# Patient Record
Sex: MALE | Race: WHITE | NOT HISPANIC OR LATINO | Employment: OTHER | URBAN - METROPOLITAN AREA
[De-identification: names, ages, dates, MRNs, and addresses within clinical notes are randomized per-mention and may not be internally consistent; named-entity substitution may affect disease eponyms.]

---

## 2017-01-24 ENCOUNTER — TELEPHONE (OUTPATIENT)
Dept: FAMILY MEDICINE | Facility: CLINIC | Age: 67
End: 2017-01-24

## 2017-01-24 NOTE — TELEPHONE ENCOUNTER
----- Message from Fabienne Benítez sent at 1/24/2017 12:54 PM CST -----  Contact: [phone]/ self   Pt requesting to speak with you in regarding questions about his health . Please advise

## 2017-02-07 ENCOUNTER — OFFICE VISIT (OUTPATIENT)
Dept: FAMILY MEDICINE | Facility: CLINIC | Age: 67
End: 2017-02-07
Payer: MEDICARE

## 2017-02-07 VITALS
SYSTOLIC BLOOD PRESSURE: 130 MMHG | WEIGHT: 155.88 LBS | DIASTOLIC BLOOD PRESSURE: 80 MMHG | HEART RATE: 96 BPM | OXYGEN SATURATION: 98 % | BODY MASS INDEX: 21.11 KG/M2 | HEIGHT: 72 IN

## 2017-02-07 DIAGNOSIS — Z12.2 ENCOUNTER FOR SCREENING FOR LUNG CANCER: ICD-10-CM

## 2017-02-07 DIAGNOSIS — J44.1 CHRONIC OBSTRUCTIVE PULMONARY DISEASE WITH ACUTE EXACERBATION: ICD-10-CM

## 2017-02-07 DIAGNOSIS — E78.6 LOW HDL (UNDER 40): ICD-10-CM

## 2017-02-07 DIAGNOSIS — Z11.59 NEED FOR HEPATITIS C SCREENING TEST: ICD-10-CM

## 2017-02-07 DIAGNOSIS — J45.901 ACUTE EXACERBATION OF COPD WITH ASTHMA: ICD-10-CM

## 2017-02-07 DIAGNOSIS — Z00.00 ENCOUNTER FOR PREVENTATIVE ADULT HEALTH CARE EXAMINATION: Primary | ICD-10-CM

## 2017-02-07 DIAGNOSIS — Z13.6 SCREENING FOR AAA (ABDOMINAL AORTIC ANEURYSM): ICD-10-CM

## 2017-02-07 DIAGNOSIS — J44.1 ACUTE EXACERBATION OF COPD WITH ASTHMA: ICD-10-CM

## 2017-02-07 DIAGNOSIS — Z13.220 LIPID SCREENING: ICD-10-CM

## 2017-02-07 PROCEDURE — 99397 PER PM REEVAL EST PAT 65+ YR: CPT | Mod: S$GLB,,,

## 2017-02-07 PROCEDURE — 3075F SYST BP GE 130 - 139MM HG: CPT | Mod: S$GLB,,,

## 2017-02-07 PROCEDURE — 3079F DIAST BP 80-89 MM HG: CPT | Mod: S$GLB,,,

## 2017-02-07 PROCEDURE — 99999 PR PBB SHADOW E&M-EST. PATIENT-LVL III: CPT | Mod: PBBFAC,,,

## 2017-02-07 PROCEDURE — 99499 UNLISTED E&M SERVICE: CPT | Mod: S$GLB,,,

## 2017-02-07 RX ORDER — ALBUTEROL SULFATE 90 UG/1
2 AEROSOL, METERED RESPIRATORY (INHALATION) EVERY 6 HOURS PRN
Qty: 18 G | Refills: 3 | Status: SHIPPED | OUTPATIENT
Start: 2017-02-07 | End: 2017-05-16 | Stop reason: SDUPTHER

## 2017-02-07 NOTE — PROGRESS NOTES
Subjective:       Patient ID: Rhett Johnson is a 66 y.o. male.    Chief Complaint: Annual Exam    HPI  Data obtained from Agency for Healthcare and Research Quality (AHRQ):    GRADE A -The USPSTF recommends the service. There is high certainty that the net benefit is substantial. Offer or provide this service.    GRADE B - The USPSTF recommends the service. There is high certainty that the net benefit is moderate or there is moderate certainty that the net benefit is moderate to substantial. Offer or provide this service.     View All      20 - Recommended (A, B)    Grade Title Risk Info. Details   UTD  Colorectal Cancer: Screening --Adults aged 50 to 75 years     Low  HIV: Screening - Adolescents and Adults     130/80 High Blood Pressure: Screening -- Adults 18 and Over     Recommended High Blood Pressure: Screening and Home Monitoring -- Adults     Low  Syphilis: Screening --Asymptomatic, nonpregnant adults and adolescents who are at increased risk for syphilis infection     Not interested  Tobacco Smoking Cessation: Behavioral and Pharmacotherapy Interventions -- Adults who are not pregnant     Candidate  Abdominal Aortic Aneurysm: Screening -- Men Ages 65 to 75 Years Who Have Ever Smoked.     Denies  Alcohol Misuse: Screening and Behavioral Counseling Interventions in Primary Care -- Adults     Denies  Depression: Screening -- General adult population, including pregnant and postpartum women     Candidate  Diabetes Mellitus (Type 2) andAbnormal Blood Glucose: Screening -- Adults aged 40 to 70 years who are overweight or obese     Low Risk  Fall Prevention -- Exercise/Physical Therapy: Community-dwelling Adults 65 Years or Older, Increased Risk for Falls     Low Risk  Fall Prevention -- Vitamin D Supplementation: Community-dwelling Adults 65 Years or Older, Increased Risk for Falls     Improved  Healthful Diet and Physical Activity for CVD Disease Prevention: Counseling -- Adults with CVD Risk Factors      Low risk  Hepatitis B: Screening -- Nonpregnant Adolescents and Adults At High Risk     Candidate  Hepatitis C Virus Infection: Screening--Adults at High Risk and Adults born between 1945 and 1965     N/A  Latent Tuberculosis Infection: Screening -- Asymptomatic adults at increased risk for infection     Candidate  Lung Cancer: Screening -- Adults Ages 55-80 who have a 30 pack-year smoking history and currently smoke or have quit within the past 15 years     Normal  Obesity: Screening for and Management of-- All Adults       Low  Sexually Transmitted Infections: Behavioral Counseling -- Sexually Active Adolescents and Adults     Will assess  Statin Use for the Primary Prevention of CVD: Preventive Medicine -- Adults age 40 to 75 years with no history of CVD, 1 or more CVD risk factors, and a calculated 10-year CVD event risk of 10% or greater.          Review of Systems   Constitutional: Negative.  Negative for activity change, appetite change, fatigue and fever.   HENT: Negative.  Negative for congestion.    Eyes: Negative.  Negative for visual disturbance.   Respiratory: Positive for shortness of breath.    Cardiovascular: Negative.    Gastrointestinal: Negative.    Endocrine: Negative.  Negative for cold intolerance, heat intolerance, polydipsia and polyuria.   Genitourinary: Negative.    Musculoskeletal: Positive for back pain. Negative for arthralgias.   Skin: Negative.    Allergic/Immunologic: Negative.    Neurological: Negative.  Negative for dizziness, syncope, speech difficulty, weakness, light-headedness and headaches.   Hematological: Negative.  Negative for adenopathy. Does not bruise/bleed easily.   Psychiatric/Behavioral: Negative.  Negative for agitation, dysphoric mood and sleep disturbance. The patient is not hyperactive.    All other systems reviewed and are negative.      Objective:      Vitals:    02/07/17 0755   BP: 130/80   Pulse: 96     Physical Exam   Constitutional: He is oriented to  person, place, and time. He appears well-developed and well-nourished. He is cooperative. No distress.   HENT:   Head: Normocephalic and atraumatic.   Right Ear: Hearing, tympanic membrane, external ear and ear canal normal.   Left Ear: Hearing, external ear and ear canal normal.   Nose: Nose normal.   Mouth/Throat: Oropharynx is clear and moist.   Eyes: Conjunctivae are normal. Pupils are equal, round, and reactive to light.   Neck: Normal range of motion. Neck supple. No thyromegaly present.   Cardiovascular: Normal rate, regular rhythm, normal heart sounds and intact distal pulses.    Pulmonary/Chest: Effort normal and breath sounds normal. No respiratory distress.   Musculoskeletal: Normal range of motion. He exhibits no edema or tenderness.   Lymphadenopathy:     He has no cervical adenopathy.   Neurological: He is alert and oriented to person, place, and time. He has normal strength. Coordination and gait normal.   Skin: Skin is warm, dry and intact. No cyanosis. Nails show no clubbing.   Psychiatric: He has a normal mood and affect. His speech is normal and behavior is normal. Judgment and thought content normal. Cognition and memory are normal.   Vitals reviewed.      Assessment:       1. Encounter for preventative adult health care examination    2. Acute exacerbation of COPD with asthma    3. Lipid screening    4. Need for hepatitis C screening test    5. Chronic obstructive pulmonary disease with acute exacerbation    6. Low HDL (under 40)    7. Screening for AAA (abdominal aortic aneurysm)    8. Encounter for screening for lung cancer        Plan:       Encounter for preventative adult health care examination    Acute exacerbation of COPD with asthma  -     albuterol 90 mcg/actuation inhaler; Inhale 2 puffs into the lungs every 6 (six) hours as needed for Wheezing.  Dispense: 18 g; Refill: 3  -     aclidinium bromide (TUDORZA PRESSAIR) 400 mcg/actuation AePB; Inhale 1 puff into the lungs 2 (two) times  daily.  Dispense: 3 each; Refill: 3  -     CBC auto differential; Future; Expected date: 2/7/17  -     Comprehensive metabolic panel; Future; Expected date: 2/7/17    Lipid screening    Need for hepatitis C screening test  -     Hepatitis C antibody; Future; Expected date: 2/7/17    Chronic obstructive pulmonary disease with acute exacerbation    Low HDL (under 40)  -     Lipid panel; Future; Expected date: 2/7/17    Screening for AAA (abdominal aortic aneurysm)  -     US Abdominal Aorta; Future; Expected date: 2/7/17    Encounter for screening for lung cancer  -     CT Chest Lung Screening Low Dose; Future; Expected date: 2/7/17    Other orders  -     zoster vaccine live, PF, (ZOSTAVAX, PF,) 19,400 unit/0.65 mL injection; Inject 19,400 Units into the skin once.  Dispense: 1 vial; Refill: 0      Return in about 1 year (around 2/7/2018).

## 2017-02-07 NOTE — MR AVS SNAPSHOT
[facility]  [address] [place] [address]  Phone: [phone]  Fax: [phone]                  Rhett Johnson   2017 8:00 AM   Office Visit    Description:  Male : 1950   Provider:  Tylor Niño Jr., MD   Department:  [facility]           Reason for Visit     Annual Exam           Diagnoses this Visit        Comments    Encounter for preventative adult health care examination    -  Primary     Acute exacerbation of COPD with asthma         Lipid screening         Need for hepatitis C screening test         Chronic obstructive pulmonary disease with acute exacerbation         Low HDL (under 40)         Screening for AAA (abdominal aortic aneurysm)         Encounter for screening for lung cancer                To Do List           Goals (5 Years of Data)     None      Follow-Up and Disposition     Return in about 1 year (around 2018).    Follow-up and Disposition History       These Medications        Disp Refills Start End    albuterol 90 mcg/actuation inhaler 18 g 3 2017    Inhale 2 puffs into the lungs every 6 (six) hours as needed for Wheezing. - Inhalation    [facility] [address] [place] [address] [facility] Ph #: [phone]       aclidinium bromide (TUDORZA PRESSAIR) 400 mcg/actuation AePB 3 each 3 2017     Inhale 1 puff into the lungs 2 (two) times daily. - Inhalation    [facility] [address] [place] [address] [facility] Ph #: [phone]       zoster vaccine live, PF, (ZOSTAVAX, PF,) 19,400 unit/0.65 mL injection 1 vial 0 2017    Inject 19,400 Units into the skin once. - Subcutaneous    [facility] [address] [place] [address] [facility] Ph #: [phone]         [facility]     [facility] Care Line -   Assistance  Registered nurses in the [facility] provide clinical advisement, health education, appointment booking, and other advisory services.  Call for this free service at [phone].             Medications           Message regarding Medications     Verify the changes and/or additions to your medication regime listed below are the same as discussed with your clinician today.  If any of these changes or additions are incorrect, please notify your healthcare provider.        START taking these NEW medications        Refills    zoster vaccine live, PF, (ZOSTAVAX, PF,) 19,400 unit/0.65 mL injection 0    Sig: Inject 19,400 Units into the skin once.    Class: Print    Route: Subcutaneous      STOP taking these medications     mupirocin (BACTROBAN) 2 % ointment FLACO EXT AA BID FOR 10 DAYS    doxycycline monohydrate 100 mg Tab     clindamycin (CLEOCIN) 300 MG capsule TK ONE C PO  TID FOR 10 DAYS           Verify that the below list of medications is an accurate representation of the medications you are currently taking.  If none reported, the list may be blank. If incorrect, please contact your healthcare provider. Carry this list with you in case of emergency.           Current Medications     aclidinium bromide (TUDORZA PRESSAIR) 400 mcg/actuation AePB Inhale 1 puff into the lungs 2 (two) times daily.    albuterol 90 mcg/actuation inhaler Inhale 2 puffs into the lungs every 6 (six) hours as needed for Wheezing.    zoster vaccine live, PF, (ZOSTAVAX, PF,) 19,400 unit/0.65 mL injection Inject 19,400 Units into the skin once.           Clinical Reference Information           Your Vitals Were     BP Pulse Height Weight SpO2 BMI    130/80 (BP Location: Left arm, Patient Position: Sitting, BP Method: Manual) 96 6' (1.829 m) 70.7 kg (155 lb 13.8 oz) 98% 21.14 kg/m2      Blood Pressure          Most Recent Value    BP  130/80      Allergies as of 2/7/2017     Cymbalta [Duloxetine]    Pcn [Penicillins]       Immunizations Administered on Date of Encounter - 2/7/2017     None      Orders Placed During Today's Visit     Future Labs/Procedures Expected by Expires    CBC auto differential  2/7/2017 2/7/2018    Comprehensive metabolic panel  2/7/2017 2/7/2018    CT Chest Lung Screening Low Dose  2/7/2017 2/7/2018    Hepatitis C antibody  2/7/2017 4/8/2018    Lipid panel  2/7/2017 2/7/2018    US Abdominal Aorta  2/7/2017 2/7/2018      [facility] Sign-Up     Activating your [facility] account is as easy as 1-2-3!     1) Visit [email or web address], select Sign Up Now, enter this activation code and your date of birth, then select Next.  [email or web address]  Expires: 3/24/2017  8:32 AM      2) Create a username and password to use when you visit [facility] in the future and select a security question in case you lose your password and select Next.    3) Enter your e-mail address and click Sign Up!    Additional Information  If you have questions, please e-mail [email or web address] or call [phone] to talk to our [facility] staff. Remember, [facility] is NOT to be used for urgent needs. For medical emergencies, dial 911.         Smoking Cessation     If you would like to quit smoking:   You may be eligible for free services if you are a [place] resident and started smoking cigarettes before September 1, 1988.  Call the Smoking Cessation Trust ([facility]) toll free at (345) 403-8689 or (797) 125-5446.   Call 1-800-QUIT-NOW if you do not meet the above criteria.            Language Assistance Services     ATTENTION: Language assistance services are available, free of charge. Please call [phone].      ATENCIÓN: Si habla reny, tiene a jackson disposición servicios gratuitos de asistencia lingüística. Llame al [phone].     CHÚ Ý: N?u b?n nói Ti?ng Vi?t, có các d?ch v? h? tr? ngôn ng? mi?n phí dành cho b?n. G?i s? [phone].         [facility] complies with applicable Federal civil rights laws and does not  discriminate on the basis of race, color, national origin, age, disability, or sex.

## 2017-02-16 ENCOUNTER — TELEPHONE (OUTPATIENT)
Dept: FAMILY MEDICINE | Facility: CLINIC | Age: 67
End: 2017-02-16

## 2017-02-22 ENCOUNTER — TELEPHONE (OUTPATIENT)
Dept: FAMILY MEDICINE | Facility: CLINIC | Age: 67
End: 2017-02-22

## 2017-02-22 DIAGNOSIS — M54.16 LUMBAR RADICULITIS: Primary | ICD-10-CM

## 2017-03-01 ENCOUNTER — TELEPHONE (OUTPATIENT)
Dept: FAMILY MEDICINE | Facility: CLINIC | Age: 67
End: 2017-03-01

## 2017-04-28 PROBLEM — M54.16 LUMBAR RADICULITIS: Status: ACTIVE | Noted: 2017-04-28

## 2017-05-16 DIAGNOSIS — J44.1 ACUTE EXACERBATION OF COPD WITH ASTHMA: ICD-10-CM

## 2017-05-16 DIAGNOSIS — J45.901 ACUTE EXACERBATION OF COPD WITH ASTHMA: ICD-10-CM

## 2017-05-16 RX ORDER — HYDROCODONE BITARTRATE AND ACETAMINOPHEN 7.5; 325 MG/1; MG/1
TABLET ORAL
Refills: 0 | COMMUNITY
Start: 2017-03-14 | End: 2017-07-19

## 2017-05-16 RX ORDER — METHYLPREDNISOLONE 4 MG/1
TABLET ORAL
Refills: 0 | COMMUNITY
Start: 2017-03-14 | End: 2017-07-19

## 2017-05-16 RX ORDER — ALBUTEROL SULFATE 90 UG/1
2 AEROSOL, METERED RESPIRATORY (INHALATION) EVERY 6 HOURS PRN
Qty: 18 G | Refills: 3 | Status: SHIPPED | OUTPATIENT
Start: 2017-05-16 | End: 2017-06-26 | Stop reason: SDUPTHER

## 2017-05-16 NOTE — TELEPHONE ENCOUNTER
----- Message from Lana Eason sent at 5/15/2017  3:59 PM CDT -----  Patient called asking doctor to fill his 3 month medicines             aclidinium bromide (TUDORZA PRESSAIR) 400 mcg/actuation AePB       albuterol 90 mcg/actuation inhaler  [facility] in [place]  Reach at   [phone]

## 2017-06-26 DIAGNOSIS — J44.1 ACUTE EXACERBATION OF COPD WITH ASTHMA: ICD-10-CM

## 2017-06-26 DIAGNOSIS — J45.901 ACUTE EXACERBATION OF COPD WITH ASTHMA: ICD-10-CM

## 2017-06-26 RX ORDER — KETOROLAC TROMETHAMINE 10 MG/1
TABLET, FILM COATED ORAL
Refills: 0 | COMMUNITY
Start: 2017-06-02 | End: 2017-07-19

## 2017-06-26 RX ORDER — METHOCARBAMOL 500 MG/1
TABLET, FILM COATED ORAL
Refills: 0 | COMMUNITY
Start: 2017-06-19 | End: 2018-12-10

## 2017-06-26 RX ORDER — ALBUTEROL SULFATE 90 UG/1
2 AEROSOL, METERED RESPIRATORY (INHALATION) EVERY 6 HOURS PRN
Qty: 18 G | Refills: 3 | Status: SHIPPED | OUTPATIENT
Start: 2017-06-26 | End: 2017-10-25 | Stop reason: SDUPTHER

## 2017-06-26 RX ORDER — TRAMADOL HYDROCHLORIDE 50 MG/1
TABLET ORAL
Refills: 0 | COMMUNITY
Start: 2017-06-05 | End: 2017-07-19

## 2017-06-26 NOTE — TELEPHONE ENCOUNTER
----- Message from Ginger Yousif sent at 6/26/2017  1:55 PM CDT -----  REFILLS:   Patient is requesting a medication refill.   RX name:ventolin hfa  Strength: 80 mg  Directions:   Pharmacy name: [facility] in [place]  Pharmacy phone #:

## 2017-10-25 DIAGNOSIS — J45.901 ACUTE EXACERBATION OF COPD WITH ASTHMA: ICD-10-CM

## 2017-10-25 DIAGNOSIS — J44.1 ACUTE EXACERBATION OF COPD WITH ASTHMA: ICD-10-CM

## 2017-10-26 RX ORDER — ALBUTEROL SULFATE 90 UG/1
AEROSOL, METERED RESPIRATORY (INHALATION)
Qty: 18 G | Refills: 11 | Status: SHIPPED | OUTPATIENT
Start: 2017-10-26 | End: 2018-01-05 | Stop reason: SDUPTHER

## 2017-11-13 ENCOUNTER — NURSE TRIAGE (OUTPATIENT)
Dept: ADMINISTRATIVE | Facility: CLINIC | Age: 67
End: 2017-11-13

## 2017-12-06 ENCOUNTER — OFFICE VISIT (OUTPATIENT)
Dept: FAMILY MEDICINE | Facility: CLINIC | Age: 67
End: 2017-12-06
Payer: MEDICARE

## 2017-12-06 VITALS
WEIGHT: 144.88 LBS | DIASTOLIC BLOOD PRESSURE: 80 MMHG | HEIGHT: 71 IN | OXYGEN SATURATION: 98 % | HEART RATE: 110 BPM | BODY MASS INDEX: 20.28 KG/M2 | RESPIRATION RATE: 16 BRPM | SYSTOLIC BLOOD PRESSURE: 140 MMHG

## 2017-12-06 DIAGNOSIS — I10 ESSENTIAL HYPERTENSION: ICD-10-CM

## 2017-12-06 DIAGNOSIS — Z85.46 HISTORY OF PROSTATE CANCER: ICD-10-CM

## 2017-12-06 DIAGNOSIS — J44.1 CHRONIC OBSTRUCTIVE PULMONARY DISEASE WITH ACUTE EXACERBATION: ICD-10-CM

## 2017-12-06 DIAGNOSIS — I70.0 ATHEROSCLEROSIS OF AORTA: ICD-10-CM

## 2017-12-06 DIAGNOSIS — Z00.00 ENCOUNTER FOR PREVENTIVE HEALTH EXAMINATION: Primary | ICD-10-CM

## 2017-12-06 DIAGNOSIS — Z77.090 EXPOSURE TO ASBESTOS: ICD-10-CM

## 2017-12-06 DIAGNOSIS — E78.5 HYPERLIPIDEMIA, UNSPECIFIED HYPERLIPIDEMIA TYPE: ICD-10-CM

## 2017-12-06 DIAGNOSIS — M54.16 LUMBAR RADICULITIS: ICD-10-CM

## 2017-12-06 DIAGNOSIS — Z23 IMMUNIZATION DUE: ICD-10-CM

## 2017-12-06 PROCEDURE — 99499 UNLISTED E&M SERVICE: CPT | Mod: S$GLB,,, | Performed by: NURSE PRACTITIONER

## 2017-12-06 PROCEDURE — 99999 PR PBB SHADOW E&M-EST. PATIENT-LVL V: CPT | Mod: PBBFAC,,, | Performed by: NURSE PRACTITIONER

## 2017-12-06 PROCEDURE — G0439 PPPS, SUBSEQ VISIT: HCPCS | Mod: S$GLB,,, | Performed by: NURSE PRACTITIONER

## 2017-12-06 PROCEDURE — 90732 PPSV23 VACC 2 YRS+ SUBQ/IM: CPT | Mod: S$GLB,,, | Performed by: FAMILY MEDICINE

## 2017-12-06 PROCEDURE — G0009 ADMIN PNEUMOCOCCAL VACCINE: HCPCS | Mod: S$GLB,,, | Performed by: FAMILY MEDICINE

## 2017-12-06 RX ORDER — LISINOPRIL 40 MG/1
TABLET ORAL
Refills: 11 | COMMUNITY
Start: 2017-11-06 | End: 2023-03-13

## 2017-12-06 RX ORDER — PRAVASTATIN SODIUM 40 MG/1
TABLET ORAL
Refills: 11 | COMMUNITY
Start: 2017-11-06 | End: 2023-07-10

## 2017-12-06 RX ORDER — LATANOPROST 50 UG/ML
SOLUTION/ DROPS OPHTHALMIC
Refills: 0 | COMMUNITY
Start: 2017-11-16 | End: 2018-12-10

## 2017-12-06 NOTE — Clinical Note
Primary Care Providers: Tylor Niño Jr., MD, MD (General)  Your patient was seen today for a HRA visit. Gap(s) in care (HEDIS gaps) have been identified during this visit that require additional testing and possible follow up.  Orders Placed This Encounter     (In Office Administered) Pneumococcal Polysaccharide Vaccine (23 Valent) (SQ/IM)  These orders were placed using [facility] approved protocol and any results will be forwarded to your office for appropriate follow up. I have included a copy of my visit note; please review the note and feel free to contact me with any questions.   Thank you for allowing me to participate in the care of your patients. Samantha Clark NP

## 2017-12-06 NOTE — PROGRESS NOTES
"Rhett Johnson presented for a  Medicare AWV and comprehensive Health Risk Assessment today. The following components were reviewed and updated:    · Medical history  · Family History  · Social history  · Allergies and Current Medications  · Health Risk Assessment  · Health Maintenance  · Care Team     ** See Completed Assessments for Annual Wellness Visit within the encounter summary.**       The following assessments were completed:  · Living Situation  · CAGE  · Depression Screening  · Timed Get Up and Go  · Whisper Test  · Cognitive Function Screening  · Nutrition Screening  · ADL Screening  · PAQ Screening    Vitals:    12/06/17 0752 12/06/17 0900   BP: (!) 146/86 (!) 140/80   BP Location: Right arm Right arm   Patient Position: Sitting Sitting   BP Method:  Medium (Manual)   Pulse: 110    Resp: 16    SpO2: 98%    Weight: 65.7 kg (144 lb 14.4 oz)    Height: 5' 11" (1.803 m)      Body mass index is 20.21 kg/m².  Physical Exam   Constitutional: He is oriented to person, place, and time. He appears well-developed and well-nourished. No distress.   HENT:   Head: Normocephalic and atraumatic.   Eyes: EOM are normal. Pupils are equal, round, and reactive to light.   Neck: Normal range of motion.   Cardiovascular: Normal rate, regular rhythm and normal heart sounds.    Pulmonary/Chest: Effort normal. No respiratory distress. He has decreased breath sounds. He has no wheezes. He has no rhonchi. He has no rales.   Abdominal: Bowel sounds are increased. There is no tenderness.   Musculoskeletal: Normal range of motion. He exhibits no edema.   Neurological: He is alert and oriented to person, place, and time. Coordination normal.   Skin: Skin is warm and dry.   Psychiatric: He has a normal mood and affect. His behavior is normal. Judgment and thought content normal.   Nursing note and vitals reviewed.        Diagnoses and health risks identified today and associated recommendations/orders:    1. Encounter for preventive " health examination    2. Chronic obstructive pulmonary disease with acute exacerbation  Chronic; stable. Continue current treatment plan as previously prescribed by PCP.     3. Atherosclerosis of aorta  Noted on US Abd. Aorta dated 2/16/2017. Patient followed by Cardiology (Dr. Roque).     4. Essential hypertension  Chronic; unstable. Continue current treatment plan as previously prescribed by PCP. Follow-up with PCP in 2 weeks.     5. Hyperlipidemia, unspecified hyperlipidemia type  Chronic; stable. Continue current treatment plan as previously prescribed by PCP.     6. Lumbar radiculitis  Chronic; stable. Continue current treatment plan as previously prescribed by Pain Medicine (Dr. Landrum).    7. Exposure to asbestos  Chronic; stable. Patient followed by PCP.    8. History of prostate cancer  Chronic; stable. Patient was followed by Urology (Dr. Bai).    9. Immunization due  - (In Office Administered) Pneumococcal Polysaccharide Vaccine (23 Valent) (SQ/IM)      Provided Rhett with a 5-10 year written screening schedule and personal prevention plan. Recommendations were developed using the USPSTF age appropriate recommendations. Education, counseling, and referrals were provided as needed. After Visit Summary printed and given to patient which includes a list of additional screenings\tests needed.    Return in about 2 weeks (around 12/20/2017) for HRA visit in 1 year.    Samantha Clark NP

## 2017-12-06 NOTE — PATIENT INSTRUCTIONS
Counseling and Referral of Other Preventative  (Italic type indicates deductible and co-insurance are waived)    Patient Name: Rhett Johnson  Today's Date: 12/6/2017      SERVICE LIMITATIONS RECOMMENDATION    Vaccines    · Pneumococcal (once after 65)    · Influenza (annually)    · Hepatitis B (if medium/high risk)    · Prevnar 13      Hepatitis B medium/high risk factors:       - End-stage renal disease       - Hemophiliacs who received Factor VII or         IX concentrates       - Clients of institutions for the mentally             retarded       - Persons who live in the same house as          a HepB carrier       - Homosexual men       - Illicit injectable drug abusers     Pneumococcal: Scheduled - see appointments     Influenza: Recommended to patient, declined     Hepatitis B: N/A     Prevnar 13: Done, no repeat necessary    Prostate cancer screening (annually to age 75)     Prostate specific antigen (PSA) Shared decision making with Provider. Sometimes a co-pay may be required if the patient decides to have this test. The USPSTF no longer recommends prostate cancer screening routinely in medicine: every 1 year    Colorectal cancer screening (to age 75)    · Fecal occult blood test (annual)  · Flexible sigmoidoscopy (5y)  · Screening colonoscopy (10y)  · Barium enema   Last done 8/22/2012, recommend to repeat every 10  years    Diabetes self-management training (no USPSTF recommendations)  Requires referral by treating physician for patient with diabetes or renal disease. 10 hours of initial DSMT sessions of no less than 30 minutes each in a continuous 12-month period. 2 hours of follow-up DSMT in subsequent years.  N/A    Glaucoma screening (no USPSTF recommendation)  Diabetes mellitus, family history   , age 50 or over    American, age 65 or over  N/A    Medical nutrition therapy for diabetes or renal disease (no recommended schedule)  Requires referral by treating physician for  patient with diabetes or renal disease or kidney transplant within the past 3 years.  Can be provided in same year as diabetes self-management training (DSMT), and CMS recommends medical nutrition therapy take place after DSMT. Up to 3 hours for initial year and 2 hours in subsequent years.  N/A    Cardiovascular screening blood tests (every 5 years)  · Fasting lipid panel  Order as a panel if possible  Last done 2/16/2017, recommend to repeat every 1  years    Diabetes screening tests (at least every 3 years, Medicare covers annually or at 6-month intervals for prediabetic patients)  · Fasting blood sugar (FBS) or glucose tolerance test (GTT)  Patient must be diagnosed with one of the following:       - Hypertension       - Dyslipidemia       - Obesity (BMI 30kg/m2)       - Previous elevated impaired FBS or GTT       ... or any two of the following:       - Overweight (BMI 25 but <30)       - Family history of diabetes       - Age 65 or older       - History of gestational diabetes or birth of baby weighing more than 9 pounds  N/A    Abdominal aortic aneurysm screening (once)  · Sonogram   Limited to patients who meet one of the following criteria:       - Men who are 65-75 years old and have smoked more than 100 cigarette in their lifetime       - Anyone with a family history of abdominal aortic aneurysm       - Anyone recommended for screening by the USPSTF  Done, no repeat necessary    HIV screening (annually for increased risk patients)  · HIV-1 and HIV-2 by EIA, or DELFINO, rapid antibody test or oral mucosa transudate  Patients must be at increased risk for HIV infection per USPSTF guidelines or pregnant. Tests covered annually for patient at increased risk or as requested by the patient. Pregnant patients may receive up to 3 tests during pregnancy.  Risks discussed, screening is not recommended    Smoking cessation counseling (up to 8 sessions per year)  Patients must be asymptomatic of tobacco-related  conditions to receive as a preventative service.  Counseled for 3-10 minutes.    Subsequent annual wellness visit  At least 12 months since last AWV  Return in one year     The following information is provided to all patients.  This information is to help you find resources for any of the problems found today that may be affecting your health:                Living healthy guide: www.[facility].[place].[facility]      Understanding Diabetes: www.diabetes.org      Eating healthy: www.cdc.gov/healthyweight      CDC home safety checklist: www.cdc.gov/steadi/patient.html      Agency on Aging: www.goea.[place].[facility]      Alcoholics anonymous (AA): www.aa.org      Physical Activity: www.[place].nih.gov/yw6bcqi      Tobacco use: www.quitwithusla.org

## 2018-01-05 DIAGNOSIS — J44.1 ACUTE EXACERBATION OF COPD WITH ASTHMA: ICD-10-CM

## 2018-01-05 DIAGNOSIS — J45.901 ACUTE EXACERBATION OF COPD WITH ASTHMA: ICD-10-CM

## 2018-01-05 NOTE — TELEPHONE ENCOUNTER
----- Message from Casey Mata sent at 1/5/2018  2:08 PM CST -----  Contact: [phone]/self  Refill request for   1. VENTOLIN HFA 90 mcg/actuation inhaler  2. aclidinium bromide (TUDORZA PRESSAIR) 400 mcg/actuation AePB  Pt would like prescription called into [facility] [phone]

## 2018-01-06 RX ORDER — ALBUTEROL SULFATE 90 UG/1
AEROSOL, METERED RESPIRATORY (INHALATION)
Qty: 18 G | Refills: 11 | Status: SHIPPED | OUTPATIENT
Start: 2018-01-06 | End: 2018-01-10

## 2018-01-08 ENCOUNTER — TELEPHONE (OUTPATIENT)
Dept: FAMILY MEDICINE | Facility: CLINIC | Age: 68
End: 2018-01-08

## 2018-01-08 NOTE — TELEPHONE ENCOUNTER
----- Message from Lana Eason sent at 1/8/2018  9:37 AM CST -----  Patient called to say insurance has changed     He uses [facility] in [place]     [facility]  [facility]  By PH[facility]    Patient needs something else to replace this     Reach at  [phone]

## 2018-01-10 ENCOUNTER — TELEPHONE (OUTPATIENT)
Dept: FAMILY MEDICINE | Facility: CLINIC | Age: 68
End: 2018-01-10

## 2018-01-10 RX ORDER — ALBUTEROL SULFATE 90 UG/1
2 AEROSOL, METERED RESPIRATORY (INHALATION) EVERY 6 HOURS PRN
Qty: 18 G | Refills: 11 | Status: SHIPPED | OUTPATIENT
Start: 2018-01-10

## 2018-01-10 NOTE — TELEPHONE ENCOUNTER
----- Message from Lana Eason sent at 1/10/2018 11:33 AM CST -----  Came by office to say he wants doctor and nurse to call in the alternative inhaler that they called him about      It is an alternative to     aclidinium bromide (TUDORZA PRESSAIR) 400 mcg/actuation AePB      CALL TO [facility]   Per patient

## 2018-01-11 NOTE — TELEPHONE ENCOUNTER
This was sent on January 6, 2018.        aclidinium bromide (TUDORZA PRESSAIR) 400 mcg/actuation AePB 1 puff, 2 times daily 3 ordered         Summary: Inhale 1 puff into the lungs 2 (two) times daily., Starting Sat 1/6/2018, Normal   Dose, Route, Frequency: 1 puff, Inhalation, 2 times daily  Start: 1/6/2018  Ord/Sold: 1/6/2018 (O)  Report  Long-term:   Pharmacy: [facility] [address] [place] [address]  Med Dose History  ChangeDiscontinue       Patient Sig: Inhale 1 puff into the lungs 2 (two) times daily.       Ordered on: 1/6/2018       Authorized by: GLENNY LYLES JR       Dispense: 3 each

## 2018-02-05 ENCOUNTER — TELEPHONE (OUTPATIENT)
Dept: FAMILY MEDICINE | Facility: CLINIC | Age: 68
End: 2018-02-05

## 2018-02-05 DIAGNOSIS — J44.1 ACUTE EXACERBATION OF COPD WITH ASTHMA: ICD-10-CM

## 2018-02-05 DIAGNOSIS — J45.901 ACUTE EXACERBATION OF COPD WITH ASTHMA: ICD-10-CM

## 2018-02-05 RX ORDER — TIOTROPIUM BROMIDE 18 UG/1
18 CAPSULE ORAL; RESPIRATORY (INHALATION) DAILY
Qty: 30 CAPSULE | Refills: 11 | Status: SHIPPED | OUTPATIENT
Start: 2018-02-05 | End: 2018-12-10

## 2018-02-05 NOTE — TELEPHONE ENCOUNTER
----- Message from Lana Eason sent at 2/5/2018 11:12 AM CST -----  Per pt:  aclidinium bromide (TUDORZA PRESSAIR) 400 mcg/actuation AePB  is not covered and Needs SPIRIVA      SEND TO [facility]

## 2018-02-05 NOTE — TELEPHONE ENCOUNTER
Pt states his wife called the insurance company and is covered. Notified pt script sent to [facility].

## 2018-02-06 ENCOUNTER — TELEPHONE (OUTPATIENT)
Dept: FAMILY MEDICINE | Facility: CLINIC | Age: 68
End: 2018-02-06

## 2018-02-06 NOTE — TELEPHONE ENCOUNTER
OK but if this is not covered he needs to get these medications from the prescribing physician not me.

## 2018-02-06 NOTE — TELEPHONE ENCOUNTER
----- Message from Lupe Dennis sent at 2/6/2018 12:35 PM CST -----  Contact: [phone]  Patient is requesting a call back regarding his rx.Please advise.

## 2018-08-20 DIAGNOSIS — Z72.0 TOBACCO USE: Primary | ICD-10-CM

## 2018-12-12 PROBLEM — H25.12 NUCLEAR SCLEROTIC CATARACT OF LEFT EYE: Status: ACTIVE | Noted: 2018-12-12

## 2018-12-12 PROBLEM — H25.12 NUCLEAR SCLEROTIC CATARACT OF LEFT EYE: Status: RESOLVED | Noted: 2018-12-12 | Resolved: 2018-12-12

## 2019-03-21 ENCOUNTER — OFFICE VISIT (OUTPATIENT)
Dept: ALLERGY | Facility: CLINIC | Age: 69
End: 2019-03-21
Payer: MEDICARE

## 2019-03-21 VITALS
WEIGHT: 159.63 LBS | HEIGHT: 72 IN | SYSTOLIC BLOOD PRESSURE: 132 MMHG | DIASTOLIC BLOOD PRESSURE: 74 MMHG | BODY MASS INDEX: 21.62 KG/M2 | HEART RATE: 88 BPM

## 2019-03-21 DIAGNOSIS — R21 RASH: Primary | ICD-10-CM

## 2019-03-21 DIAGNOSIS — L29.9 ITCHING: ICD-10-CM

## 2019-03-21 PROCEDURE — 3075F PR MOST RECENT SYSTOLIC BLOOD PRESS GE 130-139MM HG: ICD-10-PCS | Mod: CPTII,S$GLB,, | Performed by: STUDENT IN AN ORGANIZED HEALTH CARE EDUCATION/TRAINING PROGRAM

## 2019-03-21 PROCEDURE — 99999 PR PBB SHADOW E&M-EST. PATIENT-LVL III: ICD-10-PCS | Mod: PBBFAC,,, | Performed by: STUDENT IN AN ORGANIZED HEALTH CARE EDUCATION/TRAINING PROGRAM

## 2019-03-21 PROCEDURE — 3075F SYST BP GE 130 - 139MM HG: CPT | Mod: CPTII,S$GLB,, | Performed by: STUDENT IN AN ORGANIZED HEALTH CARE EDUCATION/TRAINING PROGRAM

## 2019-03-21 PROCEDURE — 1101F PR PT FALLS ASSESS DOC 0-1 FALLS W/OUT INJ PAST YR: ICD-10-PCS | Mod: CPTII,S$GLB,, | Performed by: STUDENT IN AN ORGANIZED HEALTH CARE EDUCATION/TRAINING PROGRAM

## 2019-03-21 PROCEDURE — 1101F PT FALLS ASSESS-DOCD LE1/YR: CPT | Mod: CPTII,S$GLB,, | Performed by: STUDENT IN AN ORGANIZED HEALTH CARE EDUCATION/TRAINING PROGRAM

## 2019-03-21 PROCEDURE — 99499 UNLISTED E&M SERVICE: CPT | Mod: S$GLB,,, | Performed by: STUDENT IN AN ORGANIZED HEALTH CARE EDUCATION/TRAINING PROGRAM

## 2019-03-21 PROCEDURE — 3078F PR MOST RECENT DIASTOLIC BLOOD PRESSURE < 80 MM HG: ICD-10-PCS | Mod: CPTII,S$GLB,, | Performed by: STUDENT IN AN ORGANIZED HEALTH CARE EDUCATION/TRAINING PROGRAM

## 2019-03-21 PROCEDURE — 99499 RISK ADDL DX/OHS AUDIT: ICD-10-PCS | Mod: S$GLB,,, | Performed by: STUDENT IN AN ORGANIZED HEALTH CARE EDUCATION/TRAINING PROGRAM

## 2019-03-21 PROCEDURE — 99999 PR PBB SHADOW E&M-EST. PATIENT-LVL III: CPT | Mod: PBBFAC,,, | Performed by: STUDENT IN AN ORGANIZED HEALTH CARE EDUCATION/TRAINING PROGRAM

## 2019-03-21 PROCEDURE — 99203 OFFICE O/P NEW LOW 30 MIN: CPT | Mod: S$GLB,,, | Performed by: STUDENT IN AN ORGANIZED HEALTH CARE EDUCATION/TRAINING PROGRAM

## 2019-03-21 PROCEDURE — 99203 PR OFFICE/OUTPT VISIT, NEW, LEVL III, 30-44 MIN: ICD-10-PCS | Mod: S$GLB,,, | Performed by: STUDENT IN AN ORGANIZED HEALTH CARE EDUCATION/TRAINING PROGRAM

## 2019-03-21 PROCEDURE — 3078F DIAST BP <80 MM HG: CPT | Mod: CPTII,S$GLB,, | Performed by: STUDENT IN AN ORGANIZED HEALTH CARE EDUCATION/TRAINING PROGRAM

## 2019-03-21 RX ORDER — CETIRIZINE HYDROCHLORIDE 10 MG/1
TABLET ORAL
Qty: 60 TABLET | Refills: 3 | Status: SHIPPED | OUTPATIENT
Start: 2019-03-21 | End: 2019-04-01

## 2019-03-21 NOTE — PROGRESS NOTES
Allergy Clinic Note  [facility]    Subjective:      Patient ID: Rhett Johnson is a 68 y.o. male.    Chief Complaint: Other (rash since July)      Referring Provider: Self, Aaareferral    History of Present Illness:  68-year-old male on prior presents for continuing evaluation of an itchy rashes since July 2018.    Patient was well until July of 2018 when following contact with a some pushes in [place] he developed and losing dermatitis of the left forearm.  About a month later he developed itchy pink papules on a pink base on his legs and fewer itchy pink papules on his torso.  The rash is pruritic but not helped by Claritin or Benadryl in the past.  He saw a dermatologist who prescribed dose pack and steroid cream with instructions to return as needed.  Instead he sought care from a 2nd provider, Dr. Pan (GP with special interest in derm)  Who instructed him to stop all topical therapies, placed immune a light box, and referred him for patch testing (as written on prescription).      Patient presents to me requesting RAST testing to things in the air.  It is not clear if he confused patch testing with RAST testing or if someone did suggest RAST testing.    Additional History:   Past medical history is significant for  COPD and smoking.  He is status post spinal surgery Family history is significant for allergies in his children. Client   reports that he has been smoking cigarettes.  He has a 51.00 pack-year smoking history. he has never used smokeless tobacco.  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  Prior to retiring 6 years ago, he worked in a factory.    Patient Active Problem List   Diagnosis    Essential hypertension    Exposure to asbestos    Chronic obstructive pulmonary disease with acute exacerbation    Lumbar radiculitis    Hyperlipidemia    Atherosclerosis of aorta    History of prostate cancer     Current  Outpatient Medications on File Prior to Visit   Medication Sig Dispense Refill    albuterol 90 mcg/actuation inhaler Inhale 2 puffs into the lungs every 6 (six) hours as needed for Wheezing. Rescue 18 g 11    lisinopril (PRINIVIL,ZESTRIL) 40 MG tablet TK 1 T PO QD  11    MAGNESIUM ORAL Take by mouth once a week.      potassium 99 mg Tab Take by mouth once daily.      pravastatin (PRAVACHOL) 40 MG tablet TK 1 T PO 1 TIME HS  11    umeclidinium (INCRUSE ELLIPTA) 62.5 mcg/actuation DsDv Inhale 1 Inhaler into the lungs once daily. Controller 30 each 11    acetaminophen (TYLENOL) 325 MG tablet Take 1 tablet (325 mg total) by mouth every 6 (six) hours as needed for Pain.  0    acetaminophen (TYLENOL) 325 MG tablet Take 1 tablet (325 mg total) by mouth every 6 (six) hours as needed for Pain.  0    besifloxacin (BESIVANCE) 0.6 % DrpS 1 drop 3 (three) times daily.      bromfenac (PROLENSA) 0.07 % Drop Apply 1 drop to eye once daily.      difluprednate (DUREZOL) 0.05 % Drop ophthalmic solution 1 drop 2 (two) times daily.      lifitegrast (XIIDRA OPHT) Apply to eye 2 (two) times daily.       No current facility-administered medications on file prior to visit.          Review of Systems   Constitutional: Negative for chills and fever.   HENT: Negative for ear discharge and nosebleeds.    Eyes: Negative for discharge and redness.   Respiratory: Positive for cough and wheezing. Negative for stridor.    Cardiovascular: Negative for palpitations and leg swelling.   Gastrointestinal: Negative for blood in stool, melena and vomiting.   Genitourinary: Negative for dysuria and hematuria.   Skin: Positive for itching and rash.   Neurological: Negative for seizures and loss of consciousness.       Objective:   /74   Pulse 88   Ht 6' (1.829 m)   Wt 72.4 kg (159 lb 9.8 oz)   BMI 21.65 kg/m²       Physical Exam   Constitutional: He is oriented to person, place, and time and well-developed, well-nourished, and in no  distress.   HENT:   Head: Normocephalic and atraumatic.   Nose: Nose normal.   Eyes: Conjunctivae are normal. No scleral icterus.   Neck: Neck supple.   Cardiovascular: Normal rate, regular rhythm and intact distal pulses.   Pulmonary/Chest: Effort normal. No stridor. No respiratory distress.   Abdominal: He exhibits no distension.   Musculoskeletal: He exhibits no edema or deformity.   Neurological: He is alert and oriented to person, place, and time.   Skin: Rash noted. No erythema.   Red face.  Scattered pink papules on torso.  Significantly more pink papules on right greater than left lower extremity on a pink base.   Psychiatric: Memory and affect normal.       Data: none      Assessment:     1. Rash    2. Itching        Plan:     Medical decision making:  Patient is presenting with a 7 month history of a pruritic rash.  It does not appear to be hives or atopic dermatitis.  Its appearance is more consistent with contact dermatitis.  I think the yield from rales testing would be extremely low.  I recommend he seek care from a board certified dermatologist for consideration of patch testing or other diagnostic evaluation.    Diagnoses and all orders for this visit:    Rash  Referred to Dermatology    Itching  -     cetirizine (ZYRTEC) 10 MG tablet; Take 1 tablet up to twice a day.  Can be used regularly or just when needed.        Patient Instructions   See Dr Diana Wallace  To consider evaluation of contact dermatology    Recommend:  1.  Curel moisturizer all over at least once a day    2.  Zyrtec (=cetirizine, 10mg) 1-2 tablets every day.           Take one every morning.  Take another later if needed.      Follow-up if symptoms worsen or fail to improve.    Janie Tobar MD

## 2019-03-21 NOTE — PATIENT INSTRUCTIONS
See Dr Diana Wallace  To consider evaluation of contact dermatology    Recommend:  1.  Curel moisturizer all over at least once a day    2.  Zyrtec (=cetirizine, 10mg) 1-2 tablets every day.           Take one every morning.  Take another later if needed.

## 2019-04-03 PROBLEM — H25.13 NS (NUCLEAR SCLEROSIS), BILATERAL: Status: ACTIVE | Noted: 2019-04-03

## 2019-04-03 PROBLEM — H25.13 NS (NUCLEAR SCLEROSIS), BILATERAL: Status: RESOLVED | Noted: 2019-04-03 | Resolved: 2019-04-03

## 2019-07-12 ENCOUNTER — OFFICE VISIT (OUTPATIENT)
Dept: URGENT CARE | Facility: CLINIC | Age: 69
End: 2019-07-12
Payer: MEDICARE

## 2019-07-12 VITALS
RESPIRATION RATE: 18 BRPM | HEIGHT: 72 IN | HEART RATE: 97 BPM | OXYGEN SATURATION: 97 % | DIASTOLIC BLOOD PRESSURE: 78 MMHG | BODY MASS INDEX: 21.67 KG/M2 | WEIGHT: 160 LBS | SYSTOLIC BLOOD PRESSURE: 145 MMHG | TEMPERATURE: 98 F

## 2019-07-12 DIAGNOSIS — R03.0 ELEVATED BLOOD PRESSURE READING: Primary | ICD-10-CM

## 2019-07-12 DIAGNOSIS — Z87.09 HISTORY OF COPD: ICD-10-CM

## 2019-07-12 DIAGNOSIS — J32.9 SINUSITIS, UNSPECIFIED CHRONICITY, UNSPECIFIED LOCATION: ICD-10-CM

## 2019-07-12 PROCEDURE — 3077F SYST BP >= 140 MM HG: CPT | Mod: CPTII,S$GLB,, | Performed by: NURSE PRACTITIONER

## 2019-07-12 PROCEDURE — 1101F PT FALLS ASSESS-DOCD LE1/YR: CPT | Mod: CPTII,S$GLB,, | Performed by: NURSE PRACTITIONER

## 2019-07-12 PROCEDURE — 99214 OFFICE O/P EST MOD 30 MIN: CPT | Mod: S$GLB,,, | Performed by: NURSE PRACTITIONER

## 2019-07-12 PROCEDURE — 3078F PR MOST RECENT DIASTOLIC BLOOD PRESSURE < 80 MM HG: ICD-10-PCS | Mod: CPTII,S$GLB,, | Performed by: NURSE PRACTITIONER

## 2019-07-12 PROCEDURE — 1101F PR PT FALLS ASSESS DOC 0-1 FALLS W/OUT INJ PAST YR: ICD-10-PCS | Mod: CPTII,S$GLB,, | Performed by: NURSE PRACTITIONER

## 2019-07-12 PROCEDURE — 99214 PR OFFICE/OUTPT VISIT, EST, LEVL IV, 30-39 MIN: ICD-10-PCS | Mod: S$GLB,,, | Performed by: NURSE PRACTITIONER

## 2019-07-12 PROCEDURE — 3078F DIAST BP <80 MM HG: CPT | Mod: CPTII,S$GLB,, | Performed by: NURSE PRACTITIONER

## 2019-07-12 PROCEDURE — 3077F PR MOST RECENT SYSTOLIC BLOOD PRESSURE >= 140 MM HG: ICD-10-PCS | Mod: CPTII,S$GLB,, | Performed by: NURSE PRACTITIONER

## 2019-07-12 RX ORDER — MUPIROCIN 20 MG/G
OINTMENT TOPICAL
Refills: 0 | COMMUNITY
Start: 2019-06-23 | End: 2023-07-10

## 2019-07-12 RX ORDER — PREDNISONE 20 MG/1
20 TABLET ORAL DAILY
Qty: 4 TABLET | Refills: 0 | Status: SHIPPED | OUTPATIENT
Start: 2019-07-12 | End: 2019-07-16

## 2019-07-12 RX ORDER — PRAVASTATIN SODIUM 40 MG/1
TABLET ORAL
COMMUNITY
Start: 2019-01-31 | End: 2023-07-10

## 2019-07-12 RX ORDER — LISINOPRIL 40 MG/1
TABLET ORAL
COMMUNITY
Start: 2019-05-09 | End: 2023-03-13

## 2019-07-12 RX ORDER — ALBUTEROL SULFATE 90 UG/1
AEROSOL, METERED RESPIRATORY (INHALATION)
COMMUNITY
Start: 2019-06-05 | End: 2023-07-10

## 2019-07-12 RX ORDER — AZITHROMYCIN 250 MG/1
TABLET, FILM COATED ORAL
Qty: 6 TABLET | Refills: 0 | Status: SHIPPED | OUTPATIENT
Start: 2019-07-12 | End: 2023-07-10

## 2019-07-12 NOTE — PROGRESS NOTES
Subjective:       Patient ID: Rhett Johnson is a 68 y.o. male.    Vitals:  height is 6' (1.829 m) and weight is 72.6 kg (160 lb). His oral temperature is 98 °F (36.7 °C). His blood pressure is 145/78 (abnormal) and his pulse is 97. His respiration is 18 and oxygen saturation is 97%.     Chief Complaint: Sinus Problem    Sinus Problem   This is a new problem. The current episode started in the past 7 days (last Saturday). The problem is unchanged. There has been no fever. His pain is at a severity of 0/10. He is experiencing no pain. Associated symptoms include congestion, coughing, ear pain and a sore throat. Pertinent negatives include no chills, diaphoresis, shortness of breath or sinus pressure. Treatments tried: NyQuil, Muscinex, DayQuil and AlkaSelzer cold plus. The treatment provided mild relief.       Constitution: Negative for chills, sweating, fatigue and fever.   HENT: Positive for ear pain, congestion and sore throat. Negative for sinus pain and sinus pressure.         Ears feel plugged and scratchy throat   Neck: Negative for painful lymph nodes.   Eyes: Negative for eye redness.   Respiratory: Positive for cough and sputum production. Negative for chest tightness, bloody sputum, COPD, shortness of breath, stridor, wheezing and asthma.    Gastrointestinal: Negative for nausea and vomiting.   Musculoskeletal: Negative for muscle ache.   Skin: Negative for rash.   Allergic/Immunologic: Negative for seasonal allergies and asthma.   Hematologic/Lymphatic: Negative for swollen lymph nodes.       Objective:      Physical Exam   Constitutional: He appears well-developed and well-nourished. He is active and cooperative.  Non-toxic appearance. He does not appear ill. No distress.   HENT:   Head: Normocephalic and atraumatic.   Right Ear: Hearing, tympanic membrane, external ear and ear canal normal.   Left Ear: Hearing, tympanic membrane, external ear and ear canal normal.   Nose: Mucosal edema and  rhinorrhea present. No nasal deformity. No epistaxis. Right sinus exhibits no maxillary sinus tenderness and no frontal sinus tenderness. Left sinus exhibits no maxillary sinus tenderness and no frontal sinus tenderness.   Mouth/Throat: Uvula is midline and mucous membranes are normal. No trismus in the jaw. Normal dentition. No uvula swelling. Posterior oropharyngeal erythema present. No oropharyngeal exudate or posterior oropharyngeal edema.   Eyes: Conjunctivae and lids are normal. No scleral icterus.   Neck: Trachea normal, full passive range of motion without pain and phonation normal. Neck supple.   Cardiovascular: Normal rate, regular rhythm, normal heart sounds, intact distal pulses and normal pulses.   Pulmonary/Chest: Effort normal and breath sounds normal. No accessory muscle usage or stridor. No tachypnea. No respiratory distress. He has no decreased breath sounds. He has no wheezes. He has no rhonchi. He has no rales.   Musculoskeletal: Normal range of motion. He exhibits no edema or deformity.   Lymphadenopathy:     He has no cervical adenopathy.        Right cervical: No superficial cervical, no deep cervical and no posterior cervical adenopathy present.       Left cervical: No superficial cervical, no deep cervical and no posterior cervical adenopathy present.   Neurological: He is alert. He is not disoriented. He exhibits normal muscle tone. Coordination normal.   Skin: Skin is warm, dry and intact. He is not diaphoretic. No pallor.   Psychiatric: He has a normal mood and affect. His speech is normal and behavior is normal. Judgment and thought content normal. Cognition and memory are normal.   Nursing note and vitals reviewed.      Assessment:       1. Elevated blood pressure reading    2. Sinusitis, unspecified chronicity, unspecified location    3. History of COPD        Plan:         Elevated blood pressure reading  -     azithromycin (ZITHROMAX Z-ANTWAN) 250 MG tablet; Take 2 tablets (500 mg) on   "Day 1,  followed by 1 tablet (250 mg) once daily on Days 2 through 5.  Dispense: 6 tablet; Refill: 0  -     predniSONE (DELTASONE) 20 MG tablet; Take 1 tablet (20 mg total) by mouth once daily. for 4 days  Dispense: 4 tablet; Refill: 0    Sinusitis, unspecified chronicity, unspecified location    History of COPD      Patient Instructions     Please follow up with your Primary care provider within 2-5 days if your signs and symptoms have not resolved or worsen.  The usual course of cold symptoms are 10-14 days.     If your condition worsens or fails to improve we recommend that you receive another evaluation at the emergency room immediately or contact your primary medical clinic to discuss your concerns.     You must understand that you have received an Urgent Care treatment only and that you may be released before all of your medical problems are known or treated.   You, the patient, will arrange for follow up care as instructed.     Tylenol or Ibuprofen can also be used as directed for pain/fever unless you have an allergy to them or medical condition such as stomach ulcers, kidney or liver disease or blood thinners etc for which you should not be taking these type of medications.     Take over the counter cough medication as directed as needed for cough.  You should avoid medications with pseudoephedrine or phenylephrine (any medication with "D") if you have high blood pressure as this can cause an elevation in your blood pressure. Instead consider Corcidin HBP as needed to prevent an elevated blood pressure.     Natural remedies of symptoms (as needed) include humidification, saline nasal sprays, and/or steamy showers.  Increase fluids, warm tea with honey, cough drops as needed.  You may also use salt water gargles for sore throat.    IF you received a steroid shot today - As discussed, this can elevate your blood pressure, elevate your blood sugar, water weight gain, nervous energy, redness to the face and " dimpling of the skin at the injection site.     Elevated Blood Pressure    Your blood pressure was elevated during your visit to the urgent care.     It was not so high that immediate care was needed but it is recommended that you monitor your blood pressure over the next week or two to make sure that it is not staying elevated.      Please have your blood pressure taken 2-3 times daily at different times of the day.  Write all of those blood pressures down and record the time that they were taken.     Keep all that information and take it with you to see your Primary Care Physician.     If you are taking antihypertensives, please continue your medication regularly as prescribed.      If your blood pressure is consistently above 140/90 you will need to follow up with your PCP more quickly.     - According to ACEP guidelines, workup and treatment of hypertension in asymptomatic patient is not warranted in the ED:    (1) Initiating treatment for asymptomatic hypertension in the ED is not necessary when patients have follow-up.    (2) Rapidly lowering blood pressure in asymptomatic patients in the ED is unnecessary and may be harmful in some patients.    (3) When ED treatment for asymptomatic hypertension is initiated, blood pressure management should attempt to gradually lower blood pressure and should not be expected to be normalized during the initial ED visit.        Call your doctor immediately or seek emergency care if you have any of the following:  · Chest pain or shortness of breath (call 911)  · Moderate to severe headache  · Weakness in the muscles of your face, arms, or legs  · Trouble speaking  · Extreme drowsiness  · Confusion  · Fainting or dizziness  · Pulsating or rushing sound in your ears  · Unexplained nosebleed  · Weakness, tingling, or numbness of your face, arms, or legs  · Change in vision  · Blood pressure measured at home that is greater than 180/110     Please consider DASH diet program  (National [place] [employer] Web site) for patients with hypertension as it is the only diet approved to improve blood pressures:     https://www.nhlbi.nih.gov/health-topics/dash-eating-plan      Sinusitis (Antibiotic Treatment)    The sinuses are air-filled spaces within the bones of the face. They connect to the inside of the nose. Sinusitis is an inflammation of the tissue lining the sinus cavity. Sinus inflammation can occur during a cold. It can also be due to allergies to pollens and other particles in the air. Sinusitis can cause symptoms of sinus congestion and fullness. A sinus infection causes fever, headache and facial pain. There is often green or yellow drainage from the nose or into the back of the throat (post-nasal drip). You have been given antibiotics to treat this condition.  Home care:  · Take the full course of antibiotics as instructed. Do not stop taking them, even if you feel better.  · Drink plenty of water, hot tea, and other liquids. This may help thin mucus. It also may promote sinus drainage.  · Heat may help soothe painful areas of the face. Use a towel soaked in hot water. Or,  the shower and direct the hot spray onto your face. Using a vaporizer along with a menthol rub at night may also help.   · An expectorant containing guaifenesin may help thin the mucus and promote drainage from the sinuses.  · Over-the-counter decongestants may be used unless a similar medicine was prescribed. Nasal sprays work the fastest. Use one that contains phenylephrine or oxymetazoline. First blow the nose gently. Then use the spray. Do not use these medicines more often than directed on the label or symptoms may get worse. You may also use tablets containing pseudoephedrine. Avoid products that combine ingredients, because side effects may be increased. Read labels. You can also ask the pharmacist for help. (NOTE: Persons with high blood pressure should not use decongestants. They can raise  blood pressure.)  · Over-the-counter antihistamines may help if allergies contributed to your sinusitis.    · Do not use nasal rinses or irrigation during an acute sinus infection, unless told to by your health care provider. Rinsing may spread the infection to other sinuses.  · Use acetaminophen or ibuprofen to control pain, unless another pain medicine was prescribed. (If you have chronic liver or kidney disease or ever had a stomach ulcer, talk with your doctor before using these medicines. Aspirin should never be used in anyone under 18 years of age who is ill with a fever. It may cause severe liver damage.)  · Don't smoke. This can worsen symptoms.  Follow-up care  Follow up with your healthcare provider or our staff if you are not improving within the next week.  When to seek medical advice  Call your healthcare provider if any of these occur:  · Facial pain or headache becoming more severe  · Stiff neck  · Unusual drowsiness or confusion  · Swelling of the forehead or eyelids  · Vision problems, including blurred or double vision  · Fever of 100.4ºF (38ºC) or higher, or as directed by your healthcare provider  · Seizure  · Breathing problems  · Symptoms not resolving within 10 days  Date Last Reviewed: 4/13/2015  © 7554-1945 [employer]. [address] [place] [address], [place] [address]. All rights reserved. This information is not intended as a substitute for professional medical care. Always follow your healthcare professional's instructions.      Discharge Instructions: COPD  You have been diagnosed with chronic obstructive pulmonary disease (COPD). This is a name given to a group of diseases that limit the flow of air in and out of your lungs. This makes it harder to breathe. With COPD, you are also more likely to get lung infections. COPD includes chronic bronchitis and emphysema. COPD is most often caused by heavy, long-term cigarette smoking.  Home care  Quit smoking  · If you smoke, quit. It is  the best thing you can do for your COPD and your overall health.  · Join a stop-smoking program. There are even telephone, text message, and Internet programs to help you quit.  · Ask your healthcare provider about medicines or other methods to help you quit.  · Ask family members to quit smoking as well.  · Don't allow people to smoke in your home, in your car, or when they are around you.  Protect yourself from infection  · Wash your hands often. Do your best to keep your hands away from your face. Most germs are spread from your hands to your mouth.  · Get a flu shot every year. Also ask your provider about pneumonia vaccines.  · Avoid crowds. It's especially important to do this in the winter when more people have colds and flu.  · To stay healthy, get enough sleep, exercise regularly, and eat a balanced diet. You should:  ¨ Get about 8 hours of sleep every night.  ¨ Try to exercise for at least 30 minutes on most days.  ¨ Have healthy foods including fruits and vegetables, 100% whole grains, lean meats and fish, and low-fat dairy products. Try to stay away from foods high in fats and sugar.  Take your medicines  Take your medicines exactly as directed. Don't skip doses.  Manage your stress  Stress can make COPD worse. Use this stress management technique:  · Find a quiet place and sit or lie in a comfortable position.  · Close your eyes and perform breathing exercises for several minutes. Ask your provider about the best way to breathe.  Pulmonary rehabilitation  · Pulmonary rehab can help you feel better. These programs include exercise, breathing techniques, information about COPD, counseling, and help for smokers.  · Ask your provider or your local hospital about programs in your area.  When to call your healthcare provider  Call your provider immediately if you have any of the following:  · Shortness of breath, wheezing, or coughing  · Increased mucus  · Yellow, green, bloody, or smelly mucus  · Fever or  chills  · Tightness in your chest that does not go away with rest or medicine  · An irregular heartbeat or a feeling that your heart is beating very fast  · Swollen ankles   Date Last Reviewed: 5/1/2016  © 8746-0238 [employer]. [address] [place] [address], [place] [address]. All rights reserved. This information is not intended as a substitute for professional medical care. Always follow your healthcare professional's instructions.

## 2019-07-12 NOTE — PATIENT INSTRUCTIONS
"Please follow up with your Primary care provider within 2-5 days if your signs and symptoms have not resolved or worsen.  The usual course of cold symptoms are 10-14 days.     If your condition worsens or fails to improve we recommend that you receive another evaluation at the emergency room immediately or contact your primary medical clinic to discuss your concerns.     You must understand that you have received an Urgent Care treatment only and that you may be released before all of your medical problems are known or treated.   You, the patient, will arrange for follow up care as instructed.     Tylenol or Ibuprofen can also be used as directed for pain/fever unless you have an allergy to them or medical condition such as stomach ulcers, kidney or liver disease or blood thinners etc for which you should not be taking these type of medications.     Take over the counter cough medication as directed as needed for cough.  You should avoid medications with pseudoephedrine or phenylephrine (any medication with "D") if you have high blood pressure as this can cause an elevation in your blood pressure. Instead consider Corcidin HBP as needed to prevent an elevated blood pressure.     Natural remedies of symptoms (as needed) include humidification, saline nasal sprays, and/or steamy showers.  Increase fluids, warm tea with honey, cough drops as needed.  You may also use salt water gargles for sore throat.    IF you received a steroid shot today - As discussed, this can elevate your blood pressure, elevate your blood sugar, water weight gain, nervous energy, redness to the face and dimpling of the skin at the injection site.     Elevated Blood Pressure    Your blood pressure was elevated during your visit to the urgent care.     It was not so high that immediate care was needed but it is recommended that you monitor your blood pressure over the next week or two to make sure that it is not staying elevated.      Please have " your blood pressure taken 2-3 times daily at different times of the day.  Write all of those blood pressures down and record the time that they were taken.     Keep all that information and take it with you to see your Primary Care Physician.     If you are taking antihypertensives, please continue your medication regularly as prescribed.      If your blood pressure is consistently above 140/90 you will need to follow up with your PCP more quickly.     - According to ACEP guidelines, workup and treatment of hypertension in asymptomatic patient is not warranted in the ED:    (1) Initiating treatment for asymptomatic hypertension in the ED is not necessary when patients have follow-up.    (2) Rapidly lowering blood pressure in asymptomatic patients in the ED is unnecessary and may be harmful in some patients.    (3) When ED treatment for asymptomatic hypertension is initiated, blood pressure management should attempt to gradually lower blood pressure and should not be expected to be normalized during the initial ED visit.        Call your doctor immediately or seek emergency care if you have any of the following:  · Chest pain or shortness of breath (call 911)  · Moderate to severe headache  · Weakness in the muscles of your face, arms, or legs  · Trouble speaking  · Extreme drowsiness  · Confusion  · Fainting or dizziness  · Pulsating or rushing sound in your ears  · Unexplained nosebleed  · Weakness, tingling, or numbness of your face, arms, or legs  · Change in vision  · Blood pressure measured at home that is greater than 180/110     Please consider DASH diet program ([employer] Web site) for patients with hypertension as it is the only diet approved to improve blood pressures:     https://www.nhlbi.nih.gov/health-topics/dash-eating-plan      Sinusitis (Antibiotic Treatment)    The sinuses are air-filled spaces within the bones of the face. They connect to the inside of the nose. Sinusitis is an  inflammation of the tissue lining the sinus cavity. Sinus inflammation can occur during a cold. It can also be due to allergies to pollens and other particles in the air. Sinusitis can cause symptoms of sinus congestion and fullness. A sinus infection causes fever, headache and facial pain. There is often green or yellow drainage from the nose or into the back of the throat (post-nasal drip). You have been given antibiotics to treat this condition.  Home care:  · Take the full course of antibiotics as instructed. Do not stop taking them, even if you feel better.  · Drink plenty of water, hot tea, and other liquids. This may help thin mucus. It also may promote sinus drainage.  · Heat may help soothe painful areas of the face. Use a towel soaked in hot water. Or,  the shower and direct the hot spray onto your face. Using a vaporizer along with a menthol rub at night may also help.   · An expectorant containing guaifenesin may help thin the mucus and promote drainage from the sinuses.  · Over-the-counter decongestants may be used unless a similar medicine was prescribed. Nasal sprays work the fastest. Use one that contains phenylephrine or oxymetazoline. First blow the nose gently. Then use the spray. Do not use these medicines more often than directed on the label or symptoms may get worse. You may also use tablets containing pseudoephedrine. Avoid products that combine ingredients, because side effects may be increased. Read labels. You can also ask the pharmacist for help. (NOTE: Persons with high blood pressure should not use decongestants. They can raise blood pressure.)  · Over-the-counter antihistamines may help if allergies contributed to your sinusitis.    · Do not use nasal rinses or irrigation during an acute sinus infection, unless told to by your health care provider. Rinsing may spread the infection to other sinuses.  · Use acetaminophen or ibuprofen to control pain, unless another pain medicine  was prescribed. (If you have chronic liver or kidney disease or ever had a stomach ulcer, talk with your doctor before using these medicines. Aspirin should never be used in anyone under 18 years of age who is ill with a fever. It may cause severe liver damage.)  · Don't smoke. This can worsen symptoms.  Follow-up care  Follow up with your healthcare provider or our staff if you are not improving within the next week.  When to seek medical advice  Call your healthcare provider if any of these occur:  · Facial pain or headache becoming more severe  · Stiff neck  · Unusual drowsiness or confusion  · Swelling of the forehead or eyelids  · Vision problems, including blurred or double vision  · Fever of 100.4ºF (38ºC) or higher, or as directed by your healthcare provider  · Seizure  · Breathing problems  · Symptoms not resolving within 10 days  Date Last Reviewed: 4/13/2015  © 6287-5387 [employer] [address] [place] [address]. All rights reserved. This information is not intended as a substitute for professional medical care. Always follow your healthcare professional's instructions.      Discharge Instructions: COPD  You have been diagnosed with chronic obstructive pulmonary disease (COPD). This is a name given to a group of diseases that limit the flow of air in and out of your lungs. This makes it harder to breathe. With COPD, you are also more likely to get lung infections. COPD includes chronic bronchitis and emphysema. COPD is most often caused by heavy, long-term cigarette smoking.  Home care  Quit smoking  · If you smoke, quit. It is the best thing you can do for your COPD and your overall health.  · Join a stop-smoking program. There are even telephone, text message, and Internet programs to help you quit.  · Ask your healthcare provider about medicines or other methods to help you quit.  · Ask family members to quit smoking as well.  · Don't allow people to smoke in your home, in  your car, or when they are around you.  Protect yourself from infection  · Wash your hands often. Do your best to keep your hands away from your face. Most germs are spread from your hands to your mouth.  · Get a flu shot every year. Also ask your provider about pneumonia vaccines.  · Avoid crowds. It's especially important to do this in the winter when more people have colds and flu.  · To stay healthy, get enough sleep, exercise regularly, and eat a balanced diet. You should:  ¨ Get about 8 hours of sleep every night.  ¨ Try to exercise for at least 30 minutes on most days.  ¨ Have healthy foods including fruits and vegetables, 100% whole grains, lean meats and fish, and low-fat dairy products. Try to stay away from foods high in fats and sugar.  Take your medicines  Take your medicines exactly as directed. Don't skip doses.  Manage your stress  Stress can make COPD worse. Use this stress management technique:  · Find a quiet place and sit or lie in a comfortable position.  · Close your eyes and perform breathing exercises for several minutes. Ask your provider about the best way to breathe.  Pulmonary rehabilitation  · Pulmonary rehab can help you feel better. These programs include exercise, breathing techniques, information about COPD, counseling, and help for smokers.  · Ask your provider or your local hospital about programs in your area.  When to call your healthcare provider  Call your provider immediately if you have any of the following:  · Shortness of breath, wheezing, or coughing  · Increased mucus  · Yellow, green, bloody, or smelly mucus  · Fever or chills  · Tightness in your chest that does not go away with rest or medicine  · An irregular heartbeat or a feeling that your heart is beating very fast  · Swollen ankles   Date Last Reviewed: 5/1/2016  © 0860-5982 [employer]. [address] [place] [address], [place] [address]. All rights reserved. This information is not intended as a substitute  for professional medical care. Always follow your healthcare professional's instructions.

## 2019-07-16 ENCOUNTER — TELEPHONE (OUTPATIENT)
Dept: URGENT CARE | Facility: CLINIC | Age: 69
End: 2019-07-16

## 2021-07-20 ENCOUNTER — TELEPHONE (OUTPATIENT)
Dept: FAMILY MEDICINE | Facility: CLINIC | Age: 71
End: 2021-07-20

## 2022-04-05 ENCOUNTER — TELEPHONE (OUTPATIENT)
Dept: FAMILY MEDICINE | Facility: CLINIC | Age: 72
End: 2022-04-05
Payer: MEDICARE

## 2022-04-05 NOTE — TELEPHONE ENCOUNTER
----- Message from Ruth Duong sent at 4/5/2022  9:50 AM CDT -----  Regarding: [facility]  Type:  Needs Medical Advice    Who Called: pt    Would the patient rather a call back or a response via [facility]? call    Best Call Back Number: [phone]    Additional Information: appt for next week if possible

## 2023-07-10 ENCOUNTER — OFFICE VISIT (OUTPATIENT)
Dept: UROLOGY | Facility: CLINIC | Age: 73
End: 2023-07-10
Payer: MEDICARE

## 2023-07-10 VITALS
DIASTOLIC BLOOD PRESSURE: 65 MMHG | SYSTOLIC BLOOD PRESSURE: 129 MMHG | HEART RATE: 62 BPM | WEIGHT: 166 LBS | HEIGHT: 72 IN | BODY MASS INDEX: 22.48 KG/M2

## 2023-07-10 DIAGNOSIS — Z85.46 HISTORY OF PROSTATE CANCER: Primary | ICD-10-CM

## 2023-07-10 DIAGNOSIS — R35.0 URINARY FREQUENCY: ICD-10-CM

## 2023-07-10 DIAGNOSIS — R39.15 URINARY URGENCY: ICD-10-CM

## 2023-07-10 DIAGNOSIS — N39.3 SUI (STRESS URINARY INCONTINENCE), MALE: ICD-10-CM

## 2023-07-10 LAB
BILIRUB UR QL STRIP: NEGATIVE
CLARITY UR REFRACT.AUTO: CLEAR
COLOR UR AUTO: YELLOW
GLUCOSE UR QL STRIP: NEGATIVE
HGB UR QL STRIP: NEGATIVE
KETONES UR QL STRIP: NEGATIVE
LEUKOCYTE ESTERASE UR QL STRIP: NEGATIVE
MICROSCOPIC COMMENT: NORMAL
NITRITE UR QL STRIP: NEGATIVE
PH UR STRIP: 5 [PH] (ref 5–8)
PROT UR QL STRIP: NEGATIVE
RBC #/AREA URNS AUTO: 0 /HPF (ref 0–4)
SP GR UR STRIP: 1.01 (ref 1–1.03)
URN SPEC COLLECT METH UR: NORMAL
WBC #/AREA URNS AUTO: 1 /HPF (ref 0–5)

## 2023-07-10 PROCEDURE — 3074F SYST BP LT 130 MM HG: CPT | Mod: CPTII,S$GLB,, | Performed by: NURSE PRACTITIONER

## 2023-07-10 PROCEDURE — 1159F MED LIST DOCD IN RCRD: CPT | Mod: CPTII,S$GLB,, | Performed by: NURSE PRACTITIONER

## 2023-07-10 PROCEDURE — 3288F FALL RISK ASSESSMENT DOCD: CPT | Mod: CPTII,S$GLB,, | Performed by: NURSE PRACTITIONER

## 2023-07-10 PROCEDURE — 99204 OFFICE O/P NEW MOD 45 MIN: CPT | Mod: S$GLB,,, | Performed by: NURSE PRACTITIONER

## 2023-07-10 PROCEDURE — 4010F PR ACE/ARB THEARPY RXD/TAKEN: ICD-10-PCS | Mod: CPTII,S$GLB,, | Performed by: NURSE PRACTITIONER

## 2023-07-10 PROCEDURE — 3008F PR BODY MASS INDEX (BMI) DOCUMENTED: ICD-10-PCS | Mod: CPTII,S$GLB,, | Performed by: NURSE PRACTITIONER

## 2023-07-10 PROCEDURE — 3008F BODY MASS INDEX DOCD: CPT | Mod: CPTII,S$GLB,, | Performed by: NURSE PRACTITIONER

## 2023-07-10 PROCEDURE — 1159F PR MEDICATION LIST DOCUMENTED IN MEDICAL RECORD: ICD-10-PCS | Mod: CPTII,S$GLB,, | Performed by: NURSE PRACTITIONER

## 2023-07-10 PROCEDURE — 3078F DIAST BP <80 MM HG: CPT | Mod: CPTII,S$GLB,, | Performed by: NURSE PRACTITIONER

## 2023-07-10 PROCEDURE — 1126F AMNT PAIN NOTED NONE PRSNT: CPT | Mod: CPTII,S$GLB,, | Performed by: NURSE PRACTITIONER

## 2023-07-10 PROCEDURE — 1157F ADVNC CARE PLAN IN RCRD: CPT | Mod: CPTII,S$GLB,, | Performed by: NURSE PRACTITIONER

## 2023-07-10 PROCEDURE — 1101F PR PT FALLS ASSESS DOC 0-1 FALLS W/OUT INJ PAST YR: ICD-10-PCS | Mod: CPTII,S$GLB,, | Performed by: NURSE PRACTITIONER

## 2023-07-10 PROCEDURE — 3078F PR MOST RECENT DIASTOLIC BLOOD PRESSURE < 80 MM HG: ICD-10-PCS | Mod: CPTII,S$GLB,, | Performed by: NURSE PRACTITIONER

## 2023-07-10 PROCEDURE — 99204 PR OFFICE/OUTPT VISIT, NEW, LEVL IV, 45-59 MIN: ICD-10-PCS | Mod: S$GLB,,, | Performed by: NURSE PRACTITIONER

## 2023-07-10 PROCEDURE — 4010F ACE/ARB THERAPY RXD/TAKEN: CPT | Mod: CPTII,S$GLB,, | Performed by: NURSE PRACTITIONER

## 2023-07-10 PROCEDURE — 1101F PT FALLS ASSESS-DOCD LE1/YR: CPT | Mod: CPTII,S$GLB,, | Performed by: NURSE PRACTITIONER

## 2023-07-10 PROCEDURE — 99999 PR PBB SHADOW E&M-EST. PATIENT-LVL III: CPT | Mod: PBBFAC,,, | Performed by: NURSE PRACTITIONER

## 2023-07-10 PROCEDURE — 1160F RVW MEDS BY RX/DR IN RCRD: CPT | Mod: CPTII,S$GLB,, | Performed by: NURSE PRACTITIONER

## 2023-07-10 PROCEDURE — 1160F PR REVIEW ALL MEDS BY PRESCRIBER/CLIN PHARMACIST DOCUMENTED: ICD-10-PCS | Mod: CPTII,S$GLB,, | Performed by: NURSE PRACTITIONER

## 2023-07-10 PROCEDURE — 3288F PR FALLS RISK ASSESSMENT DOCUMENTED: ICD-10-PCS | Mod: CPTII,S$GLB,, | Performed by: NURSE PRACTITIONER

## 2023-07-10 PROCEDURE — 3074F PR MOST RECENT SYSTOLIC BLOOD PRESSURE < 130 MM HG: ICD-10-PCS | Mod: CPTII,S$GLB,, | Performed by: NURSE PRACTITIONER

## 2023-07-10 PROCEDURE — 81001 URINALYSIS AUTO W/SCOPE: CPT | Performed by: NURSE PRACTITIONER

## 2023-07-10 PROCEDURE — 1157F PR ADVANCE CARE PLAN OR EQUIV PRESENT IN MEDICAL RECORD: ICD-10-PCS | Mod: CPTII,S$GLB,, | Performed by: NURSE PRACTITIONER

## 2023-07-10 PROCEDURE — 1126F PR PAIN SEVERITY QUANTIFIED, NO PAIN PRESENT: ICD-10-PCS | Mod: CPTII,S$GLB,, | Performed by: NURSE PRACTITIONER

## 2023-07-10 PROCEDURE — 87086 URINE CULTURE/COLONY COUNT: CPT | Performed by: NURSE PRACTITIONER

## 2023-07-10 PROCEDURE — 99999 PR PBB SHADOW E&M-EST. PATIENT-LVL III: ICD-10-PCS | Mod: PBBFAC,,, | Performed by: NURSE PRACTITIONER

## 2023-07-10 RX ORDER — PREGABALIN 100 MG/1
100 CAPSULE ORAL 2 TIMES DAILY
COMMUNITY
Start: 2023-02-06 | End: 2023-07-10

## 2023-07-10 RX ORDER — NIFEDIPINE 60 MG/1
60 TABLET, EXTENDED RELEASE ORAL
COMMUNITY
Start: 2023-04-25

## 2023-07-10 RX ORDER — CLOPIDOGREL BISULFATE 75 MG/1
75 TABLET ORAL
COMMUNITY
Start: 2023-06-17

## 2023-07-10 RX ORDER — ROSUVASTATIN CALCIUM 40 MG/1
40 TABLET, COATED ORAL
COMMUNITY
Start: 2023-06-17

## 2023-07-10 RX ORDER — FINASTERIDE 5 MG/1
5 TABLET, FILM COATED ORAL
COMMUNITY
Start: 2023-06-17 | End: 2023-08-08 | Stop reason: ALTCHOICE

## 2023-07-10 RX ORDER — LISINOPRIL 20 MG/1
20 TABLET ORAL
COMMUNITY
Start: 2023-02-11

## 2023-07-10 RX ORDER — SOLIFENACIN SUCCINATE 10 MG/1
10 TABLET, FILM COATED ORAL DAILY
Qty: 30 TABLET | Refills: 11 | Status: SHIPPED | OUTPATIENT
Start: 2023-07-10 | End: 2023-08-08

## 2023-07-10 RX ORDER — GABAPENTIN 300 MG/1
300 CAPSULE ORAL 3 TIMES DAILY
COMMUNITY
Start: 2023-06-20 | End: 2023-07-10

## 2023-07-10 RX ORDER — DOXAZOSIN 8 MG/1
8 TABLET ORAL 2 TIMES DAILY
COMMUNITY
Start: 2023-06-26 | End: 2023-07-10

## 2023-07-10 RX ORDER — ZINC GLUCONATE 50 MG
50 TABLET ORAL
COMMUNITY

## 2023-07-10 RX ORDER — CALCIUM CARB/VITAMIN D3/VIT K1 500-500-40
1 TABLET,CHEWABLE ORAL
COMMUNITY

## 2023-07-10 NOTE — PROGRESS NOTES
Subjective:       Patient ID: Rhett Johnson is a 72 y.o. male.    Chief Complaint: Urinary Incontinence    Patient is new to me. He is a 71 yo WM who is here today with c/o urinary incontinence with heavy lifting and position changes. Patient is S/P radical prostatectomy in 2001. Patient reports worsening of his urinary incontinence after his back surgery in 8/2021 and 3/2022. He is now wearing depends and changes twice daily. The depends are fully saturated with urine. Patient reports being prescribed finasteride 5 mg daily by his Cardiologist (Dr. Roque) until he was able to get an appt with urology for management of his urinary symptoms. He is here today with his wife (Rhina).     Other  This is a chronic (urinary incontinence) problem. The current episode started more than 1 year ago. The problem occurs daily. The problem has been gradually worsening. Associated symptoms include urinary symptoms. Pertinent negatives include no abdominal pain, anorexia, arthralgias, change in bowel habit, chills, fatigue, fever, headaches, nausea, swollen glands, vomiting or weakness. Nothing aggravates the symptoms. Treatments tried: finasteride. The treatment provided no relief.   Review of Systems   Constitutional:  Negative for chills, fatigue and fever.   Gastrointestinal:  Negative for abdominal pain, anorexia, change in bowel habit, nausea, vomiting and change in bowel habit.   Genitourinary:  Positive for bladder incontinence, frequency and urgency. Negative for decreased urine volume, difficulty urinating, dysuria, flank pain, hematuria, penile pain, penile swelling, scrotal swelling and testicular pain.        Nocturia x2   Musculoskeletal:  Positive for back pain (chronic). Negative for arthralgias.   Neurological:  Negative for dizziness, weakness and headaches.   Psychiatric/Behavioral: Negative.         Objective:      Physical Exam  Vitals and nursing note reviewed.   Constitutional:       General: He is not  in acute distress.     Appearance: He is well-developed and normal weight. He is not ill-appearing.   HENT:      Head: Normocephalic and atraumatic.   Eyes:      Pupils: Pupils are equal, round, and reactive to light.   Cardiovascular:      Rate and Rhythm: Normal rate.   Pulmonary:      Effort: Pulmonary effort is normal. No respiratory distress.   Abdominal:      Palpations: Abdomen is soft.      Tenderness: There is no abdominal tenderness.   Musculoskeletal:         General: Normal range of motion.      Cervical back: Normal range of motion.   Skin:     General: Skin is warm and dry.   Neurological:      Mental Status: He is alert and oriented to person, place, and time.      Coordination: Coordination normal.   Psychiatric:         Mood and Affect: Mood normal.         Behavior: Behavior normal.         Thought Content: Thought content normal.         Judgment: Judgment normal.       Assessment:       Problem List Items Addressed This Visit          Oncology    History of prostate cancer - Primary    Relevant Orders    Urine culture    Urinalysis     Other Visit Diagnoses       Urinary urgency        Relevant Medications    solifenacin (VESICARE) 10 MG tablet    Other Relevant Orders    Urine culture    Urinalysis    Urinary frequency        Relevant Medications    solifenacin (VESICARE) 10 MG tablet    Other Relevant Orders    Urine culture    Urinalysis    MERARI (stress urinary incontinence), male        Relevant Orders    Urine culture    Urinalysis            Plan:             Rhett was seen today for urinary incontinence.    Diagnoses and all orders for this visit:    History of prostate cancer  -     Urine culture  -     Urinalysis  -     PSA, Total and Free; Future    Urinary urgency  -     Urine culture  -     Urinalysis  -     solifenacin (VESICARE) 10 MG tablet; Take 1 tablet (10 mg total) by mouth once daily.  -     PSA, Total and Free; Future    Urinary frequency  -     Urine culture  -      Urinalysis  -     solifenacin (VESICARE) 10 MG tablet; Take 1 tablet (10 mg total) by mouth once daily.  -     PSA, Total and Free; Future    MERARI (stress urinary incontinence), male  -     Urine culture  -     Urinalysis  -     PSA, Total and Free; Future    Other orders  Discontinue finasteride (Avodart) 5 mg at this time.   Start trial of solifenacin (Vesicare) 10 mg daily for urinary symptoms.   PSA, total and free today     NOTE: May consider PT for urinary incontinence in the future.      Follow-up in 4 weeks.    Samantha Clark NP

## 2023-07-10 NOTE — PATIENT INSTRUCTIONS
U/A and urine cx  Discontinue finasteride (Avodart) 5 mg at this time.   Start trial of solifenacin (Vesicare) 10 mg daily for urinary symptoms.   PSA, total and free today  Follow-up in 4 weeks.

## 2023-07-11 ENCOUNTER — TELEPHONE (OUTPATIENT)
Dept: UROLOGY | Facility: CLINIC | Age: 73
End: 2023-07-11
Payer: MEDICARE

## 2023-07-11 LAB — BACTERIA UR CULT: NO GROWTH

## 2023-07-11 NOTE — TELEPHONE ENCOUNTER
Pt informed via phone call.    ----- Message from Samantha Clark NP sent at 7/11/2023  9:36 AM CDT -----  Please inform patient via telephone that his PSA was normal at less than 0.01. Recommended to repeat in 1 year.

## 2023-07-21 ENCOUNTER — TELEPHONE (OUTPATIENT)
Dept: UROLOGY | Facility: CLINIC | Age: 73
End: 2023-07-21
Payer: MEDICARE

## 2023-07-21 NOTE — TELEPHONE ENCOUNTER
Called patient to give results, no answer, left message. [facility] message sent

## 2023-07-21 NOTE — TELEPHONE ENCOUNTER
----- Message from Samantha Clark NP sent at 7/21/2023  4:07 PM CDT -----  Please inform patient via telephone that his urine cx was normal. Continue with plan of care and keep follow-up appt.

## 2023-08-08 ENCOUNTER — OFFICE VISIT (OUTPATIENT)
Dept: UROLOGY | Facility: CLINIC | Age: 73
End: 2023-08-08
Payer: MEDICARE

## 2023-08-08 VITALS
WEIGHT: 166.38 LBS | DIASTOLIC BLOOD PRESSURE: 64 MMHG | HEIGHT: 72 IN | SYSTOLIC BLOOD PRESSURE: 131 MMHG | HEART RATE: 69 BPM | BODY MASS INDEX: 22.54 KG/M2

## 2023-08-08 DIAGNOSIS — Z85.46 HISTORY OF PROSTATE CANCER: ICD-10-CM

## 2023-08-08 DIAGNOSIS — R35.0 URINARY FREQUENCY: ICD-10-CM

## 2023-08-08 DIAGNOSIS — N39.3 SUI (STRESS URINARY INCONTINENCE), MALE: ICD-10-CM

## 2023-08-08 DIAGNOSIS — R39.15 URINARY URGENCY: Primary | ICD-10-CM

## 2023-08-08 PROCEDURE — 3288F FALL RISK ASSESSMENT DOCD: CPT | Mod: CPTII,S$GLB,, | Performed by: NURSE PRACTITIONER

## 2023-08-08 PROCEDURE — 1101F PT FALLS ASSESS-DOCD LE1/YR: CPT | Mod: CPTII,S$GLB,, | Performed by: NURSE PRACTITIONER

## 2023-08-08 PROCEDURE — 99999 PR PBB SHADOW E&M-EST. PATIENT-LVL IV: ICD-10-PCS | Mod: PBBFAC,,, | Performed by: NURSE PRACTITIONER

## 2023-08-08 PROCEDURE — 3078F PR MOST RECENT DIASTOLIC BLOOD PRESSURE < 80 MM HG: ICD-10-PCS | Mod: CPTII,S$GLB,, | Performed by: NURSE PRACTITIONER

## 2023-08-08 PROCEDURE — 3008F BODY MASS INDEX DOCD: CPT | Mod: CPTII,S$GLB,, | Performed by: NURSE PRACTITIONER

## 2023-08-08 PROCEDURE — 99213 PR OFFICE/OUTPT VISIT, EST, LEVL III, 20-29 MIN: ICD-10-PCS | Mod: S$GLB,,, | Performed by: NURSE PRACTITIONER

## 2023-08-08 PROCEDURE — 1160F PR REVIEW ALL MEDS BY PRESCRIBER/CLIN PHARMACIST DOCUMENTED: ICD-10-PCS | Mod: CPTII,S$GLB,, | Performed by: NURSE PRACTITIONER

## 2023-08-08 PROCEDURE — 1159F MED LIST DOCD IN RCRD: CPT | Mod: CPTII,S$GLB,, | Performed by: NURSE PRACTITIONER

## 2023-08-08 PROCEDURE — 4010F PR ACE/ARB THEARPY RXD/TAKEN: ICD-10-PCS | Mod: CPTII,S$GLB,, | Performed by: NURSE PRACTITIONER

## 2023-08-08 PROCEDURE — 1157F ADVNC CARE PLAN IN RCRD: CPT | Mod: CPTII,S$GLB,, | Performed by: NURSE PRACTITIONER

## 2023-08-08 PROCEDURE — 3008F PR BODY MASS INDEX (BMI) DOCUMENTED: ICD-10-PCS | Mod: CPTII,S$GLB,, | Performed by: NURSE PRACTITIONER

## 2023-08-08 PROCEDURE — 1157F PR ADVANCE CARE PLAN OR EQUIV PRESENT IN MEDICAL RECORD: ICD-10-PCS | Mod: CPTII,S$GLB,, | Performed by: NURSE PRACTITIONER

## 2023-08-08 PROCEDURE — 1126F AMNT PAIN NOTED NONE PRSNT: CPT | Mod: CPTII,S$GLB,, | Performed by: NURSE PRACTITIONER

## 2023-08-08 PROCEDURE — 1101F PR PT FALLS ASSESS DOC 0-1 FALLS W/OUT INJ PAST YR: ICD-10-PCS | Mod: CPTII,S$GLB,, | Performed by: NURSE PRACTITIONER

## 2023-08-08 PROCEDURE — 3075F PR MOST RECENT SYSTOLIC BLOOD PRESS GE 130-139MM HG: ICD-10-PCS | Mod: CPTII,S$GLB,, | Performed by: NURSE PRACTITIONER

## 2023-08-08 PROCEDURE — 1159F PR MEDICATION LIST DOCUMENTED IN MEDICAL RECORD: ICD-10-PCS | Mod: CPTII,S$GLB,, | Performed by: NURSE PRACTITIONER

## 2023-08-08 PROCEDURE — 3075F SYST BP GE 130 - 139MM HG: CPT | Mod: CPTII,S$GLB,, | Performed by: NURSE PRACTITIONER

## 2023-08-08 PROCEDURE — 99213 OFFICE O/P EST LOW 20 MIN: CPT | Mod: S$GLB,,, | Performed by: NURSE PRACTITIONER

## 2023-08-08 PROCEDURE — 1160F RVW MEDS BY RX/DR IN RCRD: CPT | Mod: CPTII,S$GLB,, | Performed by: NURSE PRACTITIONER

## 2023-08-08 PROCEDURE — 1126F PR PAIN SEVERITY QUANTIFIED, NO PAIN PRESENT: ICD-10-PCS | Mod: CPTII,S$GLB,, | Performed by: NURSE PRACTITIONER

## 2023-08-08 PROCEDURE — 3288F PR FALLS RISK ASSESSMENT DOCUMENTED: ICD-10-PCS | Mod: CPTII,S$GLB,, | Performed by: NURSE PRACTITIONER

## 2023-08-08 PROCEDURE — 4010F ACE/ARB THERAPY RXD/TAKEN: CPT | Mod: CPTII,S$GLB,, | Performed by: NURSE PRACTITIONER

## 2023-08-08 PROCEDURE — 99999 PR PBB SHADOW E&M-EST. PATIENT-LVL IV: CPT | Mod: PBBFAC,,, | Performed by: NURSE PRACTITIONER

## 2023-08-08 PROCEDURE — 3078F DIAST BP <80 MM HG: CPT | Mod: CPTII,S$GLB,, | Performed by: NURSE PRACTITIONER

## 2023-08-08 NOTE — PROGRESS NOTES
Subjective:       Patient ID: Rhett Johnson is a 72 y.o. male.    Chief Complaint: Follow-up    Patient is here today for a 4 week follow-up for urinary incontinence. He was started on solifenacin 10 mg at last visit, which he reports was not effective. Patient reports he still has urinary incontinence, mainly with lifting something heavy. Discussed PT for pelvic health (urinary), but patient reports trying that in the past without success. He states his urinary symptoms are manageable and not interfering with his quality of life at this time.     Follow-up  This is a chronic (urinary incontinence) problem. The current episode started more than 1 year ago. The problem occurs daily. The problem has been unchanged. Associated symptoms include urinary symptoms. Pertinent negatives include no abdominal pain, change in bowel habit, chills, fatigue, fever, headaches, nausea, swollen glands, vomiting or weakness. The symptoms are aggravated by bending and exertion. Treatments tried: PT and vesicare 10 mg. The treatment provided no relief.     Review of Systems   Constitutional:  Negative for chills, fatigue and fever.   Gastrointestinal:  Negative for abdominal pain, change in bowel habit, constipation, diarrhea, nausea, vomiting and change in bowel habit.   Genitourinary:  Positive for bladder incontinence. Negative for decreased urine volume, difficulty urinating, discharge, dysuria, flank pain, frequency, hematuria, penile pain, penile swelling, scrotal swelling, testicular pain and urgency.   Neurological:  Negative for dizziness, weakness and headaches.   Psychiatric/Behavioral: Negative.           Objective:      Physical Exam  Vitals and nursing note reviewed.   Constitutional:       General: He is not in acute distress.     Appearance: He is well-developed and normal weight. He is not ill-appearing.   HENT:      Head: Normocephalic and atraumatic.   Eyes:      Pupils: Pupils are equal, round, and reactive to  light.   Cardiovascular:      Rate and Rhythm: Normal rate.   Pulmonary:      Effort: Pulmonary effort is normal. No respiratory distress.   Abdominal:      Palpations: Abdomen is soft.      Tenderness: There is no abdominal tenderness.   Musculoskeletal:         General: Normal range of motion.      Cervical back: Normal range of motion.   Skin:     General: Skin is warm and dry.   Neurological:      Mental Status: He is alert and oriented to person, place, and time.      Coordination: Coordination normal.   Psychiatric:         Mood and Affect: Mood normal.         Behavior: Behavior normal.         Thought Content: Thought content normal.         Judgment: Judgment normal.         Assessment:       Problem List Items Addressed This Visit          Oncology    History of prostate cancer     Other Visit Diagnoses       Urinary urgency    -  Primary    Urinary frequency        MERARI (stress urinary incontinence), male                Plan:           Rhett was seen today for follow-up.    Diagnoses and all orders for this visit:    Urinary urgency    Urinary frequency    MERARI (stress urinary incontinence), male    History of prostate cancer    Other order  Discontinue solifenacin (Vesicare) 10 mg at this time.     Follow-up in 1 year or sooner if needed.    Samantha Clark NP

## 2023-08-31 DIAGNOSIS — M79.602 LEFT ARM PAIN: Primary | ICD-10-CM

## 2023-08-31 DIAGNOSIS — G56.92 NEUROPATHY, UPPER EXTREMITY, LEFT: ICD-10-CM

## 2023-09-11 ENCOUNTER — TELEPHONE (OUTPATIENT)
Dept: NEUROLOGY | Facility: CLINIC | Age: 73
End: 2023-09-11
Payer: MEDICARE

## 2023-09-11 NOTE — TELEPHONE ENCOUNTER
Returned call to Dr. Roque' office. Spoke to Ms. Real but was not able to relay to her who the message was from  because no contact name was left. I did let Ms. Real know that Mr. Johnson's appointment for his EMG has been scheduled for 9/29/23 at 11am and that the patient was aware. She stated that she notated the appointment and was going to route it to Dr. Roque' staff. She was given my direct contact information in case of any further questions or concerns.

## 2023-09-29 ENCOUNTER — PROCEDURE VISIT (OUTPATIENT)
Dept: NEUROLOGY | Facility: CLINIC | Age: 73
End: 2023-09-29
Payer: MEDICARE

## 2023-09-29 DIAGNOSIS — G56.92 NEUROPATHY, UPPER EXTREMITY, LEFT: ICD-10-CM

## 2023-09-29 DIAGNOSIS — M79.602 LEFT ARM PAIN: ICD-10-CM

## 2023-09-29 PROCEDURE — 95913 NRV CNDJ TEST 13/> STUDIES: CPT | Mod: S$GLB,,, | Performed by: PSYCHIATRY & NEUROLOGY

## 2023-09-29 PROCEDURE — 95913 PR NERVE CONDUCTION STUDY; 13 OR MORE STUDIES: ICD-10-PCS | Mod: S$GLB,,, | Performed by: PSYCHIATRY & NEUROLOGY

## 2023-09-29 PROCEDURE — 95886 MUSC TEST DONE W/N TEST COMP: CPT | Mod: S$GLB,,, | Performed by: PSYCHIATRY & NEUROLOGY

## 2023-09-29 PROCEDURE — 95886 PR EMG COMPLETE, W/ NERVE CONDUCTION STUDIES, 5+ MUSCLES: ICD-10-PCS | Mod: S$GLB,,, | Performed by: PSYCHIATRY & NEUROLOGY

## 2024-01-11 ENCOUNTER — OFFICE VISIT (OUTPATIENT)
Dept: URGENT CARE | Facility: CLINIC | Age: 74
End: 2024-01-11
Payer: MEDICARE

## 2024-01-11 VITALS
SYSTOLIC BLOOD PRESSURE: 116 MMHG | WEIGHT: 166 LBS | BODY MASS INDEX: 23.77 KG/M2 | DIASTOLIC BLOOD PRESSURE: 55 MMHG | HEIGHT: 70 IN | RESPIRATION RATE: 20 BRPM | TEMPERATURE: 99 F | HEART RATE: 99 BPM | OXYGEN SATURATION: 96 %

## 2024-01-11 DIAGNOSIS — J44.1 CHRONIC OBSTRUCTIVE PULMONARY DISEASE WITH ACUTE EXACERBATION: Primary | ICD-10-CM

## 2024-01-11 DIAGNOSIS — Z85.46 HISTORY OF PROSTATE CANCER: ICD-10-CM

## 2024-01-11 DIAGNOSIS — E78.5 HYPERLIPIDEMIA, UNSPECIFIED HYPERLIPIDEMIA TYPE: ICD-10-CM

## 2024-01-11 DIAGNOSIS — I70.0 ATHEROSCLEROSIS OF AORTA: ICD-10-CM

## 2024-01-11 DIAGNOSIS — I10 ESSENTIAL HYPERTENSION: ICD-10-CM

## 2024-01-11 PROCEDURE — 99204 OFFICE O/P NEW MOD 45 MIN: CPT | Mod: S$GLB,,, | Performed by: PHYSICIAN ASSISTANT

## 2024-01-11 RX ORDER — BENZONATATE 100 MG/1
100 CAPSULE ORAL 3 TIMES DAILY PRN
Qty: 20 CAPSULE | Refills: 0 | Status: SHIPPED | OUTPATIENT
Start: 2024-01-11 | End: 2024-01-21

## 2024-01-11 RX ORDER — AZITHROMYCIN 250 MG/1
TABLET, FILM COATED ORAL
Qty: 6 TABLET | Refills: 0 | Status: SHIPPED | OUTPATIENT
Start: 2024-01-11 | End: 2024-01-16

## 2024-01-11 RX ORDER — PREDNISONE 20 MG/1
20 TABLET ORAL DAILY
Qty: 4 TABLET | Refills: 0 | Status: SHIPPED | OUTPATIENT
Start: 2024-01-11 | End: 2024-01-15

## 2024-01-11 NOTE — PROGRESS NOTES
"Subjective:      Patient ID: Rhett Johnson is a 73 y.o. male.    Vitals:  height is 5' 10" (1.778 m) and weight is 75.3 kg (166 lb). His oral temperature is 98.7 °F (37.1 °C). His blood pressure is 116/55 (abnormal) and his pulse is 99. His respiration is 20 and oxygen saturation is 96%.     Chief Complaint: Cough    73 year old male patient presenting with cough and congestion x 2 days    Patient provider note starts here:  Patient presents with complaints of cough x2 days in the setting of having COPD. Denies fevers, chest pain or SOB. He has been taking Nyquil without significant relief. He denies known ill contacts but is around a lot of children. He no longer smokes tobacco. Denies fevers. Has an albuterol inhaler to use should he need it.   He denies COVID or flu swabs today.     Cough  This is a new problem. The current episode started yesterday. The problem has been gradually worsening. The problem occurs every few minutes. The cough is Productive of sputum. Associated symptoms include nasal congestion. Pertinent negatives include no chest pain, chills, ear congestion, ear pain, fever, headaches, postnasal drip, rash, sore throat or wheezing. Nothing aggravates the symptoms. Treatments tried: nyquil and dayquil. The treatment provided mild relief. His past medical history is significant for asthma, bronchitis, COPD and pneumonia. There is no history of emphysema.       Constitution: Negative for chills and fever.   HENT:  Positive for congestion. Negative for ear pain, postnasal drip and sore throat.    Neck: Negative for neck pain and neck stiffness.   Cardiovascular:  Negative for chest pain.   Respiratory:  Positive for cough, sputum production and COPD. Negative for chest tightness and wheezing.    Gastrointestinal:  Negative for abdominal pain, vomiting and diarrhea.   Musculoskeletal:  Negative for pain.   Skin:  Negative for rash and wound.   Allergic/Immunologic: Negative for itching. "   Neurological:  Negative for headaches, numbness and tingling.      Objective:     Physical Exam   Constitutional: He is oriented to person, place, and time. He appears well-developed. He is cooperative.  Non-toxic appearance. He does not appear ill. No distress.   HENT:   Head: Normocephalic and atraumatic.   Ears:   Right Ear: Hearing, tympanic membrane, external ear and ear canal normal.   Left Ear: Hearing, tympanic membrane, external ear and ear canal normal.   Nose: Congestion present. No mucosal edema, rhinorrhea or nasal deformity. No epistaxis. Right sinus exhibits no maxillary sinus tenderness and no frontal sinus tenderness. Left sinus exhibits no maxillary sinus tenderness and no frontal sinus tenderness.   Mouth/Throat: Uvula is midline, oropharynx is clear and moist and mucous membranes are normal. No trismus in the jaw. Normal dentition. No uvula swelling. No oropharyngeal exudate, posterior oropharyngeal edema or posterior oropharyngeal erythema.   Eyes: Conjunctivae and lids are normal. No scleral icterus.   Neck: Trachea normal and phonation normal. Neck supple. No edema present. No erythema present. No neck rigidity present.   Cardiovascular: Normal rate, regular rhythm, normal heart sounds and normal pulses.   Pulmonary/Chest: Effort normal. No respiratory distress. He has no decreased breath sounds. He has no wheezes. He has no rhonchi.   Abdominal: Normal appearance.   Musculoskeletal: Normal range of motion.         General: No deformity. Normal range of motion.   Neurological: He is alert and oriented to person, place, and time. He exhibits normal muscle tone. Coordination normal.   Skin: Skin is warm, dry, intact, not diaphoretic and not pale.   Psychiatric: His speech is normal and behavior is normal. Judgment and thought content normal.   Nursing note and vitals reviewed.      Assessment:     1. Chronic obstructive pulmonary disease with acute exacerbation    2. Essential hypertension     3. Hyperlipidemia, unspecified hyperlipidemia type    4. Atherosclerosis of aorta    5. History of prostate cancer        Plan:       Chronic obstructive pulmonary disease with acute exacerbation  -     azithromycin (Z-ANTWAN) 250 MG tablet; Take 2 tablets by mouth on day 1; Take 1 tablet by mouth on days 2-5  Dispense: 6 tablet; Refill: 0  -     benzonatate (TESSALON) 100 MG capsule; Take 1 capsule (100 mg total) by mouth 3 (three) times daily as needed for Cough.  Dispense: 20 capsule; Refill: 0  -     predniSONE (DELTASONE) 20 MG tablet; Take 1 tablet (20 mg total) by mouth once daily. for 4 days  Dispense: 4 tablet; Refill: 0    Essential hypertension    Hyperlipidemia, unspecified hyperlipidemia type    Atherosclerosis of aorta    History of prostate cancer          Medical Decision Making:   History:   Old Medical Records: I decided to obtain old medical records.  Urgent Care Management:  A. Problem List:   -Acute: COPD with acute exacerbation   -Chronic: COPD, history of prostate cancer, HTN, HLD   B. Differential diagnosis: viral vs bacterial URI, pharyngitis, otitis, COVID 19, influenza, pneumonia  C. Diagnostic Testing Ordered: None  D. Diagnostic Testing Considered: None  E. Independent Historians: None  F. Urgent Care Midlevel Independent Results Interpretation:   G. Radiology:  H. Review of Previous Medical Records:  I. Home Medications Reviewed  J. Social Determinants of Health considered  K. Medical Decision Making and Disposition: Patient presents with complaints of cough x2 days in the setting of having COPD. On exam, he is afebrile and nontoxic appearing. There is some expiratory wheezing noted diffusely to all lung fields. He declined flu and COVID tests. I have prescribed treatment for COPD exacerbation and encouraged close follow-up with PCP. ED precautions discussed, he verbalized understanding and agreed with plan.       Additional MDM:     Heart Failure Score:   COPD = Yes          Patient  Instructions   You have been prescribed a steroid today. Take the prescription as directed. Steroids can increase blood sugar. You can also have the following when taking steroids: flushing, jitteriness, weight gain, fluid retention, bone weakening. If you develop any adverse symptoms, stop taking the medication immediately.    You must understand that you've received an Urgent Care treatment only and that you may be released before all your medical problems are known or treated. You, the patient, will arrange for follow up care as instructed.      Follow up with your PCP or specialty clinic as instructed in the next 2-3 days if not improved or as needed. You can call [phone] to schedule an appointment with appropriate provider.      If you condition worsens, we recommend that you receive another evaluation at the emergency room immediately or contact your primary medical clinic's after hours call service to discuss your concerns.      Please return here or go to the Emergency Department for any concerns or worsening condition.      If you were prescribed a narcotic or controlled substance, do not drive or operate heavy equipment or machinery while taking these medications.

## 2024-01-11 NOTE — PATIENT INSTRUCTIONS
You have been prescribed a steroid today. Take the prescription as directed. Steroids can increase blood sugar. You can also have the following when taking steroids: flushing, jitteriness, weight gain, fluid retention, bone weakening. If you develop any adverse symptoms, stop taking the medication immediately.    You must understand that you've received an Urgent Care treatment only and that you may be released before all your medical problems are known or treated. You, the patient, will arrange for follow up care as instructed.      Follow up with your PCP or specialty clinic as instructed in the next 2-3 days if not improved or as needed. You can call [phone] to schedule an appointment with appropriate provider.      If you condition worsens, we recommend that you receive another evaluation at the emergency room immediately or contact your primary medical clinic's after hours call service to discuss your concerns.      Please return here or go to the Emergency Department for any concerns or worsening condition.      If you were prescribed a narcotic or controlled substance, do not drive or operate heavy equipment or machinery while taking these medications.

## 2024-05-28 ENCOUNTER — OFFICE VISIT (OUTPATIENT)
Dept: UROLOGY | Facility: CLINIC | Age: 74
End: 2024-05-28
Payer: MEDICARE

## 2024-05-28 VITALS
DIASTOLIC BLOOD PRESSURE: 70 MMHG | HEIGHT: 70 IN | WEIGHT: 172.63 LBS | HEART RATE: 68 BPM | BODY MASS INDEX: 24.71 KG/M2 | SYSTOLIC BLOOD PRESSURE: 130 MMHG

## 2024-05-28 DIAGNOSIS — Z85.46 HISTORY OF PROSTATE CANCER: ICD-10-CM

## 2024-05-28 DIAGNOSIS — R31.0 GROSS HEMATURIA: ICD-10-CM

## 2024-05-28 DIAGNOSIS — N32.89 BLADDER WALL THICKENING: ICD-10-CM

## 2024-05-28 DIAGNOSIS — Q64.4 URACHAL REMNANT: ICD-10-CM

## 2024-05-28 DIAGNOSIS — N30.91 HEMORRHAGIC CYSTITIS: Primary | ICD-10-CM

## 2024-05-28 LAB
BILIRUB UR QL STRIP: NEGATIVE
CLARITY UR REFRACT.AUTO: CLEAR
COLOR UR AUTO: YELLOW
GLUCOSE UR QL STRIP: NEGATIVE
HGB UR QL STRIP: NEGATIVE
KETONES UR QL STRIP: NEGATIVE
LEUKOCYTE ESTERASE UR QL STRIP: NEGATIVE
NITRITE UR QL STRIP: NEGATIVE
PH UR STRIP: 7 [PH] (ref 5–8)
PROT UR QL STRIP: NEGATIVE
SP GR UR STRIP: 1.01 (ref 1–1.03)
URN SPEC COLLECT METH UR: NORMAL

## 2024-05-28 PROCEDURE — 3078F DIAST BP <80 MM HG: CPT | Mod: CPTII,S$GLB,, | Performed by: NURSE PRACTITIONER

## 2024-05-28 PROCEDURE — 3075F SYST BP GE 130 - 139MM HG: CPT | Mod: CPTII,S$GLB,, | Performed by: NURSE PRACTITIONER

## 2024-05-28 PROCEDURE — 81003 URINALYSIS AUTO W/O SCOPE: CPT | Performed by: NURSE PRACTITIONER

## 2024-05-28 PROCEDURE — 3288F FALL RISK ASSESSMENT DOCD: CPT | Mod: CPTII,S$GLB,, | Performed by: NURSE PRACTITIONER

## 2024-05-28 PROCEDURE — 99214 OFFICE O/P EST MOD 30 MIN: CPT | Mod: S$GLB,,, | Performed by: NURSE PRACTITIONER

## 2024-05-28 PROCEDURE — 1160F RVW MEDS BY RX/DR IN RCRD: CPT | Mod: CPTII,S$GLB,, | Performed by: NURSE PRACTITIONER

## 2024-05-28 PROCEDURE — 1157F ADVNC CARE PLAN IN RCRD: CPT | Mod: CPTII,S$GLB,, | Performed by: NURSE PRACTITIONER

## 2024-05-28 PROCEDURE — 3008F BODY MASS INDEX DOCD: CPT | Mod: CPTII,S$GLB,, | Performed by: NURSE PRACTITIONER

## 2024-05-28 PROCEDURE — 1159F MED LIST DOCD IN RCRD: CPT | Mod: CPTII,S$GLB,, | Performed by: NURSE PRACTITIONER

## 2024-05-28 PROCEDURE — 1125F AMNT PAIN NOTED PAIN PRSNT: CPT | Mod: CPTII,S$GLB,, | Performed by: NURSE PRACTITIONER

## 2024-05-28 PROCEDURE — 1101F PT FALLS ASSESS-DOCD LE1/YR: CPT | Mod: CPTII,S$GLB,, | Performed by: NURSE PRACTITIONER

## 2024-05-28 PROCEDURE — 87086 URINE CULTURE/COLONY COUNT: CPT | Performed by: NURSE PRACTITIONER

## 2024-05-28 PROCEDURE — 99999 PR PBB SHADOW E&M-EST. PATIENT-LVL IV: CPT | Mod: PBBFAC,,, | Performed by: NURSE PRACTITIONER

## 2024-05-28 NOTE — PATIENT INSTRUCTIONS
Sign release of information for labs (PSA) from Cardiology (Dr. Roque) within the last 12 months.   U/A and urine cx today  Schedule patient for in-clinic cystoscopy with Dr. Bai for evaluation of gross hematuria and urachal remnant.

## 2024-05-28 NOTE — PROGRESS NOTES
Subjective:       Patient ID: Rhett Johnson is a 73 y.o. male.    Chief Complaint: Hematuria (ER Visit)    Patient is here today for an ER follow-up for gross hematuria. A CT was performed at that time, which showed asymmetric bladder wall thickening and urachal remnant. Results discussed with patient and his wife. He is currently taking Plavix. He denies any known injury or hx of nephrolithiasis.     Hematuria  This is a new problem. The current episode started in the past 7 days. The problem has been resolved since onset. He describes the hematuria as gross hematuria. The hematuria occurs throughout his entire urinary stream. He reports clotting at the beginning of his urine stream. His pain is at a severity of 0/10. He is experiencing no pain. He describes his urine color as bright red. Irritative symptoms do not include frequency or urgency. Pertinent negatives include no chills, dysuria, fever or flank pain.     Review of Systems   Constitutional:  Negative for chills, fatigue and fever.   Gastrointestinal: Negative.    Genitourinary:  Positive for hematuria. Negative for decreased urine volume, difficulty urinating, discharge, dysuria, flank pain, frequency, penile pain, penile swelling, scrotal swelling, testicular pain and urgency.   Neurological:  Negative for dizziness, weakness and headaches.   Psychiatric/Behavioral: Negative.           Objective:      Physical Exam  Vitals and nursing note reviewed.   Constitutional:       General: He is not in acute distress.     Appearance: He is well-developed and normal weight. He is not ill-appearing.   HENT:      Head: Normocephalic and atraumatic.   Eyes:      Pupils: Pupils are equal, round, and reactive to light.   Cardiovascular:      Rate and Rhythm: Normal rate and regular rhythm.      Heart sounds: Normal heart sounds.   Pulmonary:      Effort: Pulmonary effort is normal. No respiratory distress.      Breath sounds: Normal breath sounds.   Abdominal:       Palpations: Abdomen is soft.      Tenderness: There is no abdominal tenderness.   Musculoskeletal:         General: Normal range of motion.      Cervical back: Normal range of motion and neck supple.   Lymphadenopathy:      Cervical: No cervical adenopathy.   Skin:     General: Skin is warm and dry.   Neurological:      Mental Status: He is alert and oriented to person, place, and time.      Coordination: Coordination normal.   Psychiatric:         Mood and Affect: Mood normal.         Behavior: Behavior normal.         Thought Content: Thought content normal.         Judgment: Judgment normal.         Assessment:       Problem List Items Addressed This Visit          Oncology    History of prostate cancer    Relevant Orders    Urinalysis (Completed)    Urine culture (Completed)     Other Visit Diagnoses       Hemorrhagic cystitis    -  Primary    Relevant Orders    Urinalysis (Completed)    Urine culture (Completed)    Urachal remnant        Relevant Orders    Urinalysis (Completed)    Urine culture (Completed)            Plan:           Rhett was seen today for hematuria.    Diagnoses and all orders for this visit:    Hemorrhagic cystitis  -     Urinalysis  -     Urine culture    Gross hematuria  -     Urinalysis  -     Urine culture    Bladder wall thickening  -     Urinalysis  -     Urine culture    Urachal remnant  -     Urinalysis  -     Urine culture    History of prostate cancer  -     Urinalysis  -     Urine culture    Other order  Schedule patient for in-clinic cystoscopy with Dr. Bai for evaluation of gross hematuria and urachal remnant.    Samantha Clark, DNP

## 2024-05-29 ENCOUNTER — TELEPHONE (OUTPATIENT)
Dept: UROLOGY | Facility: CLINIC | Age: 74
End: 2024-05-29
Payer: MEDICARE

## 2024-05-29 LAB — BACTERIA UR CULT: NORMAL

## 2024-05-29 NOTE — TELEPHONE ENCOUNTER
----- Message from Yasmin Zamudio sent at 5/29/2024 11:27 AM CDT -----  Type:  Needs Medical Advice    Who Called: pt  Symptoms (please be specific): pt needs you to put orders in for blood work to check kidney function please call pt when orders ore put in so he can call to schedule    Would the patient rather a call back or a response via [facility]? call  Best Call Back Number: [phone]  Additional Information:

## 2024-05-29 NOTE — TELEPHONE ENCOUNTER
Spoke to patient wife and notified her that we are waiting on outside labs to come in from 's office to see if any additional labs need to be ordered. She understood

## 2024-05-31 ENCOUNTER — TELEPHONE (OUTPATIENT)
Dept: UROLOGY | Facility: CLINIC | Age: 74
End: 2024-05-31
Payer: MEDICARE

## 2024-05-31 NOTE — TELEPHONE ENCOUNTER
----- Message from Samantha Clark NP sent at 5/31/2024  9:07 AM CDT -----  Please inform patient via telephone that his urine cx and urinalysis was normal. Will continue to move forward with in-clinic cystoscopy.

## 2024-06-11 ENCOUNTER — OFFICE VISIT (OUTPATIENT)
Dept: NEUROLOGY | Facility: CLINIC | Age: 74
End: 2024-06-11
Payer: MEDICARE

## 2024-06-11 ENCOUNTER — TELEPHONE (OUTPATIENT)
Dept: NEUROLOGY | Facility: CLINIC | Age: 74
End: 2024-06-11

## 2024-06-11 VITALS
DIASTOLIC BLOOD PRESSURE: 77 MMHG | HEIGHT: 70 IN | BODY MASS INDEX: 24.61 KG/M2 | HEART RATE: 70 BPM | SYSTOLIC BLOOD PRESSURE: 151 MMHG | WEIGHT: 171.94 LBS

## 2024-06-11 DIAGNOSIS — G25.2 ORTHOSTATIC TREMOR: Primary | ICD-10-CM

## 2024-06-11 DIAGNOSIS — R41.3 SHORT-TERM MEMORY LOSS: ICD-10-CM

## 2024-06-11 DIAGNOSIS — R41.3 OTHER AMNESIA: ICD-10-CM

## 2024-06-11 DIAGNOSIS — G25.3 MYOCLONUS: ICD-10-CM

## 2024-06-11 DIAGNOSIS — I95.1 ORTHOSTASIS: ICD-10-CM

## 2024-06-11 PROCEDURE — 3078F DIAST BP <80 MM HG: CPT | Mod: CPTII,S$GLB,, | Performed by: PSYCHIATRY & NEUROLOGY

## 2024-06-11 PROCEDURE — 99999 PR PBB SHADOW E&M-EST. PATIENT-LVL III: CPT | Mod: PBBFAC,,, | Performed by: PSYCHIATRY & NEUROLOGY

## 2024-06-11 PROCEDURE — 3077F SYST BP >= 140 MM HG: CPT | Mod: CPTII,S$GLB,, | Performed by: PSYCHIATRY & NEUROLOGY

## 2024-06-11 PROCEDURE — 3008F BODY MASS INDEX DOCD: CPT | Mod: CPTII,S$GLB,, | Performed by: PSYCHIATRY & NEUROLOGY

## 2024-06-11 PROCEDURE — 1160F RVW MEDS BY RX/DR IN RCRD: CPT | Mod: CPTII,S$GLB,, | Performed by: PSYCHIATRY & NEUROLOGY

## 2024-06-11 PROCEDURE — 3288F FALL RISK ASSESSMENT DOCD: CPT | Mod: CPTII,S$GLB,, | Performed by: PSYCHIATRY & NEUROLOGY

## 2024-06-11 PROCEDURE — 1159F MED LIST DOCD IN RCRD: CPT | Mod: CPTII,S$GLB,, | Performed by: PSYCHIATRY & NEUROLOGY

## 2024-06-11 PROCEDURE — 1101F PT FALLS ASSESS-DOCD LE1/YR: CPT | Mod: CPTII,S$GLB,, | Performed by: PSYCHIATRY & NEUROLOGY

## 2024-06-11 PROCEDURE — 1125F AMNT PAIN NOTED PAIN PRSNT: CPT | Mod: CPTII,S$GLB,, | Performed by: PSYCHIATRY & NEUROLOGY

## 2024-06-11 PROCEDURE — 1157F ADVNC CARE PLAN IN RCRD: CPT | Mod: CPTII,S$GLB,, | Performed by: PSYCHIATRY & NEUROLOGY

## 2024-06-11 PROCEDURE — 99205 OFFICE O/P NEW HI 60 MIN: CPT | Mod: S$GLB,,, | Performed by: PSYCHIATRY & NEUROLOGY

## 2024-06-11 RX ORDER — CYCLOBENZAPRINE HCL 10 MG
10 TABLET ORAL 3 TIMES DAILY
COMMUNITY
Start: 2024-05-23

## 2024-06-11 NOTE — TELEPHONE ENCOUNTER
Returned Rhina call about labs. I told her patient does not have to fast for the labs. Rhina voiced understanding.

## 2024-06-11 NOTE — TELEPHONE ENCOUNTER
----- Message from Salo Wiley sent at 6/11/2024  1:01 PM CDT -----  Type:  Needs Medical Advice    Who Called: Pt Spouse Rhina  Would the patient rather a call back or a response via [facility]? call  Best Call Back Number:  [phone]  Additional Information: would like a call from nurse or ma regarding today's visit have a few question to ask please call

## 2024-06-11 NOTE — PROGRESS NOTES
"Subjective:       Patient ID: Rhett Johnson is a 73 y.o. male.    Chief Complaint: No chief complaint on file.      Mr Johnson is a 73-year-old  gentleman with a history of hypertension hyperlipidemia PAD status post 7 stents to lower extremities, 100% occlusion of left internal carotid artery, status post anterior cervical fusion and status post lumbar spine surgery x3 who presents with his wife and is here for an assessment of unusual spells that began in 2017.  The episodes had apparently improves spontaneously but returned recently and the patient's wife is particularly distressed as she fears that he will sustain serious head trauma.      What happens is that the patient will be walking along and suddenly his arms or legs start forcefully jerking in an erratic a rhythmic manner and if he does not sit he will generally fall.  He does not feel that his head or trunk jerks.  There is no lurching.  It never happens when he is seated and thus driving is not an issue.  He does feel it coming on and he states that at 1st begins with what he describes as a breeze tingle or tickle or blowing pain through his forearms.  He does not feel lightheaded or near syncopal.  The whole thing lasts about 10 seconds there is never any alteration of consciousness and there has never been any loss of consciousness.  His wife states some of them were mild where some of them are "vicious) he has had lots of bruises.      He has had 1 minor head injury.      He tends to squat when it happens to get closer to the ground.        His wife notes that in 2020 he had a severe episode that was harder than usual and he could not control in when he went to the ER he found out he had double pneumonia.      Blood pressure never tilt to his knowledge.      He does not exercise because of chronic severe low back pain.      He does have a history of alcohol and tobacco abuse but quit both cold turkey on December 16, 2020.  He states he " drank 12-18 beers a day  Takes multiple vitamins and supplements specifically D3 see zinc turmeric and B complex        CT Cervical Spine Without Contrast  Order: [id number]  Status: Final result       Visible to patient: Yes (seen)       Next appt: 07/26/2024 at 01:30 PM in Urology (Fabian Bai MD)    0 Result Notes  Details      Reading Physician Reading Date Result Priority  Dean Vogel MD  [phone] 7/31/2023 STAT    Narrative & Impression  EXAMINATION:  CT CERVICAL SPINE WITHOUT CONTRAST     CLINICAL HISTORY:  Neck pain, acute, prior cervical surgery;     TECHNIQUE:  Low dose axial images, sagittal and coronal reformations were performed though the cervical spine.  Contrast was not administered.     FINDINGS:  The visualized intracranial content is unremarkable. The visualized portion of the lungs is significant for emphysema.  The visualized extracranial soft tissues and vascular structures from the base of the skull to the visualized portion of the superior mediastinum are significant for bilateral carotid calcification.  There is thickening of the proximal esophagus cannot exclude esophagitis.  There is degenerative change throughout the cervical spine with anterior fusion of C5 through C7. There is no fracture, dislocation, or bony erosion.     Impression:     No evidence of fracture.     Nonspecific thickening of the proximal esophageal wall cannot exclude esophagitis.        Electronically signed by:Dean Vogel MD  Date:                                            07/31/2023  Time:                                           11:06      MRI CERVICAL SPINE WITHOUT CONTRAST  Order: [id number]  Impression    1.   Interval development of facet joint effusions and medial synovial cysts of bilateral L4-L5 facets causing worsened moderate thecal sac narrowing at L4-5 at the site of prior laminectomy. Notable amorphous soft tissue within the laminectomy defect suggestive of granulation tissue.  2.   Interval  worsening junctional disease at L3-4 with Modic type I endplate changes, severe thecal sac narrowing, and moderate bilateral neural foramina narrowing at the junctional level.  3.   Multilevel mild thoracic spondylosis with up to mild worsened bilateral neural foraminal narrowing at T11-T12. No thecal sac narrowing of the thoracic spine.  4.   Unchanged multilevel cervical spondylosis with up to moderate thecal sac narrowing at C3-C4 and C4-C5, as well as multilevel severe neural foraminal narrowing as detailed above. Unchanged moderate anterolisthesis at the junctional level of C7-T1.    Electronically Signed By: Nael Richmond 4/25/2022 12:32 CDT      MRI BRAIN WITHOUT CONTRAST  Order: [id number]  Impression    1.   No acute intracranial abnormality.  2.   Unchanged mild chronic microvascular ischemic changes of the brain and diffuse cerebral volume loss.  3.   Unchanged chronic occlusion of the left intracranial ICA.  4.   Unchanged intracranial atherosclerosis with up to severe narrowing of the right cavernous ICA. Unchanged asymmetric decreased flow signal within the left MCA likely secondary to chronic occlusion of the left ICA.    Electronically Signed By: Nael Richmond 7/30/2021 10:34 CDT  Narrative    EXAM: [facility] MRI BRAIN WITHOUT CONTRAST, [facility] MRA HEAD WITHOUT CONTRAST    CLINICAL INDICATION: Action tremor, syncope, collapse.    TECHNIQUE: Multiple acquisitions of the brain were obtained without contrast. 3D time-of-flight MRA of the head. Source data and maximum intensity projections (MIPs) were reviewed.    COMPARISON: MRI brain and MRA head 11/1/2020    FINDINGS:  MRI BRAIN:  Parenchyma: No infarction on DWI. No hemorrhage. No mass or mass effect. Scattered foci of T2 hyperintensity are present in the cerebral white matter and brainstem that are nonspecific but compatible with unchanged mild chronic microvascular ischemic changes. Unchanged diffuse cerebral volume loss.    Extra-axial Collection:  None    Ventricular System: Normal    Major Intracranial Flow Voids: Unchanged abnormal flow signal throughout the entire petrous and cavernous left internal carotid artery with reconstitution of normal flow signal in the supraclinoid ICA as detailed below.    Osseous Structures: Expected marrow signal. Unchanged upper cervical spondylosis.    Included Orbits: Status post bilateral cataract surgery    Paranasal Sinuses: Unchanged chronic right maxillary fungal colonization.    Tympanomastoid Cavities: Slight interval improvement of bilateral tympanomastoid effusions.      MRA HEAD:  Anterior Circulation:  Right intracranial internal carotid artery (ICA): Unchanged severe multifocal stenosis and irregularity of the right cavernous ICA.  Right anterior cerebral artery (MARIANNA): Normal  Right middle cerebral artery (MCA): Normal    Left intracranial internal carotid artery (ICA): Unchanged absence of flow signal throughout the intracranial left ICA from the petrous segment through the cavernous segment and supraclinoid segment. Reconstitution of flow signal within the terminal ICA from collateral flow.  Left anterior cerebral artery (MARIANNA): Normal  Left middle cerebral artery (MCA): Unchanged slight asymmetric decreased flow signal within the left M2 branches likely secondary to chronic occlusion of the left ICA. No luminal narrowing.    Anterior communicating artery (AComm): Present  Posterior communicating arteries (PComm): Present bilaterally.    Posterior Circulation:  Right posterior cerebral artery (PCA): Normal  Left posterior cerebral artery (PCA): Normal    Right vertebral artery (VA): Normal  Left vertebral artery (VA): Normal  Basilar artery (BA): Normal    Other: Normal  Exam End: 07/30/21 09:27   Specimen Collected: 07/30/21 10:24 Last Resulted: 07/30/21 10:34  Received From: [facility]                  Past Medical History:   Diagnosis Date    Cancer     Carotid occlusion, left     100% blockage    COPD  (chronic obstructive pulmonary disease)     Hx of hepatitis     Hypertension     on medication monitoring    Prostate cancer 2001    Dr. Bai       Past Surgical History:   Procedure Laterality Date    ADENOIDECTOMY      APPENDECTOMY      Arthroscopic left knee Left     CARDIAC CATHETERIZATION      no stents    COLONOSCOPY      INSERTION OF MULTIFOCAL INTRAOCULAR LENS Left 12/12/2018    Procedure: INSERTION, IOL, MULTIFOCAL;  Surgeon: Eleanor Seaman MD;  Location: Replaced by [facility] OR;  Service: Ophthalmology;  Laterality: Left;    INSERTION OF MULTIFOCAL INTRAOCULAR LENS Right 4/3/2019    Procedure: INSERTION, IOL, MULTIFOCAL;  Surgeon: Eleanor Seaman MD;  Location: Replaced by [facility] OR;  Service: Ophthalmology;  Laterality: Right;    NECK SURGERY  2002    PHACOEMULSIFICATION OF CATARACT Left 12/12/2018    Procedure: PHACOEMULSIFICATION, CATARACT;  Surgeon: Eleanor Seaman MD;  Location: Replaced by [facility] OR;  Service: Ophthalmology;  Laterality: Left;    PHACOEMULSIFICATION OF CATARACT Right 4/3/2019    Procedure: PHACOEMULSIFICATION, CATARACT;  Surgeon: Eleanor Seaman MD;  Location: Replaced by [facility] OR;  Service: Ophthalmology;  Laterality: Right;    PROSTATE SURGERY  2001    Radical prostectemy     SPINE SURGERY  2002    Fusion of c4,c5,c6    TONSILLECTOMY          Current Outpatient Medications:     albuterol 90 mcg/actuation inhaler, Inhale 2 puffs into the lungs every 6 (six) hours as needed for Wheezing. Rescue, Disp: 18 g, Rfl: 11    ascorbic acid, vitamin C, (VITAMIN C) 100 MG tablet, Take 100 mg by mouth., Disp: , Rfl:     cholecalciferol, vitamin D3, 10 mcg (400 unit) Cap capsule, Take 1 tablet by mouth., Disp: , Rfl:     clopidogreL (PLAVIX) 75 mg tablet, Take 75 mg by mouth., Disp: , Rfl:     cyclobenzaprine (FLEXERIL) 10 MG tablet, Take 10 mg by mouth 3 (three) times daily., Disp: , Rfl:     lisinopriL (PRINIVIL,ZESTRIL) 20 MG tablet, Take 20 mg by mouth., Disp: , Rfl:     metoprolol tartrate (LOPRESSOR) 100 MG tablet, Take 0.5 tablets (50 mg total) by  mouth 2 (two) times daily., Disp: 30 tablet, Rfl: 0    NIFEdipine (ADALAT CC) 60 MG TbSR, Take 60 mg by mouth., Disp: , Rfl:     rosuvastatin (CRESTOR) 40 MG Tab, Take 40 mg by mouth., Disp: , Rfl:     umeclidinium (INCRUSE ELLIPTA) 62.5 mcg/actuation DsDv, Inhale 1 Inhaler into the lungs once daily. Controller, Disp: 30 each, Rfl: 11    zinc gluconate 50 mg tablet, Take 50 mg by mouth., Disp: , Rfl:   No current facility-administered medications for this visit.   Review of patient's allergies indicates:   Allergen Reactions    Pcn [penicillins] Anaphylaxis    Valium [diazepam] Anaphylaxis    Cymbalta [duloxetine]         Review of Systems   Musculoskeletal:  Positive for back pain and gait problem (stride is shortened but he attributes to LBP).   Neurological:  Positive for memory loss ('normal aging'). Negative for weakness, numbness and headaches.           Objective:      Physical Exam  Constitutional:       Appearance: He is not ill-appearing.   Neurological:      Mental Status: He is alert.      Cranial Nerves: No cranial nerve deficit or dysarthria.      Motor: Abnormal muscle tone (mild increased tone BLE) present. No weakness, tremor or pronator drift.      Coordination: Finger-Nose-Finger Test abnormal and Heel to Campbell Test abnormal.      Gait: Tandem walk abnormal (labored). Gait normal.      Deep Tendon Reflexes: Reflexes abnormal (absent).      Comments: Very subtle but definite asymmetry of F-N and H-N with mild dysmetria   Psychiatric:         Behavior: Behavior normal.         Thought Content: Thought content normal.           Assessment:       1. Orthostatic tremor    2. Myoclonus    3. Orthostasis    4. Short-term memory loss    5. Other amnesia        Plan:            1) Patient with left ICA occlusion ( no history of CVA, only mild white matter changes), and known lumbar and cervical central stenosis (without gait ataxia) has a 7 year history of episodic bilateral erratic myoclonic tremor and  associated with frequent falls--  Differential primarily includes myoclonus versus atypical orthostatic tremor.     Plan consider trial of Depakote.    We will 1st have EEG and tilt-table performed.        2) His wife volunteered that he has some minor problems with short-term memory.  This does not affect ADLs.    Consider [place] return visit.        Caroline Hendrix MD   06/20/2024   10:07 AM

## 2024-06-19 ENCOUNTER — HOSPITAL ENCOUNTER (OUTPATIENT)
Dept: NEUROLOGY | Facility: HOSPITAL | Age: 74
Discharge: HOME | End: 2024-06-19
Attending: PSYCHIATRY & NEUROLOGY
Payer: MEDICARE

## 2024-06-19 DIAGNOSIS — I95.1 ORTHOSTASIS: ICD-10-CM

## 2024-06-19 PROCEDURE — 95819 EEG AWAKE AND ASLEEP: CPT | Mod: 26,,, | Performed by: INTERNAL MEDICINE

## 2024-06-19 PROCEDURE — 95819 EEG AWAKE AND ASLEEP: CPT

## 2024-06-24 PROBLEM — I95.1 ORTHOSTASIS: Status: ACTIVE | Noted: 2024-06-24

## 2024-06-24 NOTE — ADDENDUM NOTE
Encounter addended by: Chery Michael on: 6/24/2024 3:15 PM   Actions taken: Problem List modified, Charge Capture section accepted

## 2024-06-24 NOTE — PROCEDURES
ELECTROENCEPHALOGRAM REPORT    DATE OF SERVICE: 06/19/2024  EEG NUMBER: OW   REQUESTED BY: Caroline Hendrix MD  LOCATION OF SERVICE: [facility]    METHODOLOGY   Electroencephalographic (EEG) recording is with electrodes placed according to the International 10-20 placement system.  Thirty two (32) channels of digital signal (sampling rate of 512/sec) including T1 and T2 was simultaneously recorded from the scalp and may include  EKG, EMG, and/or eye monitors.  Recording band pass was 0.1 to 512 hz.  Digital video recording of the patient is simultaneously recorded with the EEG.  The patient is instructed report clinical symptoms which may occur during the recording session.  EEG and video recording is stored and archived in digital format. Activation procedures which include photic stimulation, hyperventilation and instructing patients to perform simple task are done in selected patients.    The EEG is displayed on a monitor screen and can be reviewed using different montages.  Computer assisted analysis is employed to detect spike and electrographic seizure activity.   The entire record is submitted for computer analysis.  The entire recording is visually reviewed and the times identified by computer analysis as being spikes or seizures are reviewed again.  Compresses spectral analysis (CSA) is also performed on the activity recorded from each individual channel.  This is displayed as a power display of frequencies from 0 to 30 Hz over time.   The CSA is reviewed looking for asymmetries in power between homologous areas of the scalp and then compared with the original EEG recording.     [employer] software was also utilized in the review of this study.  This software suite analyzes the EEG recording in multiple domains.  Coherence and rhythmicity is computed to identify EEG sections which may contain organized seizures.  Each channel undergoes analysis to detect presence of spike and sharp waves which  have special and morphological characteristic of epileptic activity.  The routine EEG recording is converted from spacial into frequency domain.  This is then displayed comparing homologous areas to identify areas of significant asymmetry.  Algorithm to identify non-cortically generated artifact is used to separate eye movement, EMG and other artifact from the EEG    EEG FINDINGS  During the maximally alert state, a 9 Hz, posterior dominant rhythm was seen which was symmetrical, well-regulated and briskly attenuated to eye opening. In the more anterior head regions, symmetric frontocentral beta frequencies predominated. As drowsiness occurred, the posterior dominant rhythm attenuated, slow rolling eye movements appeared, and symmetrical vertex sharp transients were seen  post were notes over the occipital regions bilaterally. Stage N2 sleep was reached and was characterized proc by low amplitude K-complexes.  No epileptiform findings were noted, no electrographic seizures were seen, and no clinical events were reported.      Activation Procedures   Hyperventilation - not performed   Photic Stimulation     - occipital driving - NO    - Pathological discharges produced - NO      IMPRESSION:  Normal awake, drowsy and asleep    CLINICAL CORRELATION:  This is a normal EEG in awake, drowsy and sleep states. There were no epileptiform findings, electrographic seizures, or no clinical events recorded. An EEG without epileptiform findings does not exclude the possibility of epilepsy. If the clinical suspicion of epilepsy is high, a repeat EEG after sleep depreivation, following a seizure, or a prolonged EEG may increase the frequency of detecting epileptiform discharges.      Chani Villatoro MD  Neurology  [facility]

## 2024-06-28 ENCOUNTER — TELEPHONE (OUTPATIENT)
Dept: NEUROLOGY | Facility: CLINIC | Age: 74
End: 2024-06-28
Payer: MEDICARE

## 2024-06-28 NOTE — TELEPHONE ENCOUNTER
----- Message from Caroline Hendrix MD sent at 6/28/2024  1:46 PM CDT -----  The MRi shows shrinkage and white spots indicative of poor circulation through deep penetrating branches of arteries deep within the brain. It also shows the known occlusion of the Lt ICA.  ----- Message -----  From: Meseret Cornejo MA  Sent: 6/28/2024   9:52 AM CDT  To: Caroline Hendrix MD    Spoke to Rhina, patients wife. She would like to know the MRI results as well that was done 6/20.  ----- Message -----  From: Caroline Hendrix MD  Sent: 6/27/2024   4:57 PM CDT  To: Meseret Cornejo MA    Pls inform EEG normal and after tilt table I will likely start depakote or keppra  ----- Message -----  From: Chani Villatoro MD  Sent: 6/24/2024   9:19 AM CDT  To: Caroline Hendrix MD

## 2024-07-03 DIAGNOSIS — G25.3 MYOCLONUS: Primary | ICD-10-CM

## 2024-07-09 ENCOUNTER — TELEPHONE (OUTPATIENT)
Dept: NEUROLOGY | Facility: CLINIC | Age: 74
End: 2024-07-09
Payer: MEDICARE

## 2024-07-09 RX ORDER — DIVALPROEX SODIUM 250 MG/1
TABLET, FILM COATED, EXTENDED RELEASE ORAL
Qty: 30 TABLET | Refills: 0 | Status: SHIPPED | OUTPATIENT
Start: 2024-07-09 | End: 2024-07-24

## 2024-07-09 RX ORDER — DIVALPROEX SODIUM 500 MG/1
1000 TABLET, FILM COATED, EXTENDED RELEASE ORAL DAILY
Qty: 60 TABLET | Refills: 2 | Status: SHIPPED | OUTPATIENT
Start: 2024-07-09 | End: 2024-10-07

## 2024-07-09 NOTE — TELEPHONE ENCOUNTER
----- Message from Caroline Hendrix MD sent at 7/9/2024 12:18 AM CDT -----  Contact: spouse  I sent in the rx; pls instruct to begin with the 250mg bottle and then advance to the 500mg bottle after 2 weeks. ( Titrating to 1000mg at bedtime, once daily)  ----- Message -----  From: Meseret Cornejo MA  Sent: 7/8/2024   2:37 PM CDT  To: Caroline Hendrix MD    Rhina called and said the patient has had 8 episodes of shaking and fell twice between July 4-6. Can he start the depakote before the Tilt Table Test on July 30.  ----- Message -----  From: Fermin Chadwick  Sent: 7/8/2024   2:19 PM CDT  To: Simeon Velazquez Staff    Type:  Needs Medical Advice    Who Called: spouse  Would the patient rather a call back or a response via [facility]? call  Best Call Back Number: [phone]  Additional Information:   Spouse requesting nurse contact her regarding pt's care

## 2024-07-17 ENCOUNTER — TELEPHONE (OUTPATIENT)
Dept: UROLOGY | Facility: CLINIC | Age: 74
End: 2024-07-17
Payer: MEDICARE

## 2024-07-17 DIAGNOSIS — N30.91 HEMORRHAGIC CYSTITIS: Primary | ICD-10-CM

## 2024-07-17 NOTE — TELEPHONE ENCOUNTER
Spoke to patient wife and she stated that blood is back in patient urine, it was a lot in the toilet bowl, she asked if there was sooner cysto appointments than patient original appointment on 7/26 which is next Friday, I notified her that there are no sooner appointments, she wanted to know if she should bring patient to ER, I notified her that I did not think that they should go to the ER but I will double check with provider.

## 2024-07-17 NOTE — TELEPHONE ENCOUNTER
Spoke to patient wife and notified her of plan of care, she stated he is taking plavix, I notified her that this can be a contribution to the hematuria

## 2024-07-17 NOTE — TELEPHONE ENCOUNTER
----- Message from Lorelei Lemus sent at 7/17/2024 10:26 AM CDT -----  Type:  Needs Medical Advice    Who Called: spouse  Would the patient rather a call back or a response via [facility]? call  Best Call Back Number: [phone]   Additional Information:   Spouse requesting a call regarding his care

## 2024-07-22 ENCOUNTER — TELEPHONE (OUTPATIENT)
Dept: UROLOGY | Facility: CLINIC | Age: 74
End: 2024-07-22
Payer: MEDICARE

## 2024-07-22 NOTE — TELEPHONE ENCOUNTER
----- Message from Samantha Clark NP sent at 7/21/2024  6:03 PM CDT -----  Please inform patient via telephone that his urine cx was normal. Continue to move forward with in-clinic cysto with Dr. Bai this week.

## 2024-07-22 NOTE — TELEPHONE ENCOUNTER
Pt wife informed of result via phone call, she states he is still having blood in his urine but will wait ti see Dr Bai on Friday to do cysto

## 2024-07-26 ENCOUNTER — PROCEDURE VISIT (OUTPATIENT)
Dept: UROLOGY | Facility: CLINIC | Age: 74
End: 2024-07-26
Payer: MEDICARE

## 2024-07-26 VITALS — HEIGHT: 70 IN | WEIGHT: 169.75 LBS | BODY MASS INDEX: 24.3 KG/M2

## 2024-07-26 DIAGNOSIS — N13.5 URETERAL OBSTRUCTION, LEFT: ICD-10-CM

## 2024-07-26 DIAGNOSIS — N13.30 HYDROURETERONEPHROSIS: ICD-10-CM

## 2024-07-26 DIAGNOSIS — D49.4 BLADDER TUMOR: Primary | ICD-10-CM

## 2024-07-26 RX ORDER — SODIUM CHLORIDE 9 MG/ML
INJECTION, SOLUTION INTRAVENOUS CONTINUOUS
OUTPATIENT
Start: 2024-07-26

## 2024-07-26 RX ORDER — CIPROFLOXACIN 2 MG/ML
400 INJECTION, SOLUTION INTRAVENOUS
OUTPATIENT
Start: 2024-07-26

## 2024-07-26 RX ORDER — CIPROFLOXACIN 500 MG/1
500 TABLET ORAL 2 TIMES DAILY
Qty: 10 TABLET | Refills: 0 | Status: SHIPPED | OUTPATIENT
Start: 2024-07-26 | End: 2024-07-31

## 2024-07-26 RX ORDER — LIDOCAINE HYDROCHLORIDE 20 MG/ML
JELLY TOPICAL ONCE
OUTPATIENT
Start: 2024-07-26 | End: 2024-07-26

## 2024-07-26 NOTE — PATIENT INSTRUCTIONS
Stop aspirin until after surgery.  Transurethral resection of bladder tumor and left ureteroscopic evaluation plan for Tuesday 7/30/2024 at [facility]  Cipro x5 days

## 2024-07-26 NOTE — PROCEDURES
Bladder Catheterization    Date/Time: 7/26/2024 1:30 PM  Location procedure was performed: [facility]    Performed by: Fabian Bai MD  Authorized by: Fabian Bai MD  Assisting provider: Fabian Bai MD  Pre-operative diagnosis: Gross hematuria  Post-operative diagnosis: Gross hematuria, large bladder tumor, obstructed left ureteral orifice  Consent Done: Not Needed  Indications: hematuria and urine specimen collection  Indications comment: Catheter placed prior to cysto to irrigate clots for bladder visualization risks are already included on cystoscopy consent  Local anesthesia used: yes    Anesthesia:  Local anesthesia used: yes  Local Anesthetic: topical anesthetic  Anesthetic total: 10 mL    Patient sedated: no  Preparation: Patient was prepped and draped in the usual sterile fashion.  Description of findings: Normal urethra, coude catheter passed easily, bladder did not drain, required irrigation   Catheter insertion: temporary indwelling  Catheter size: 18 Fr  Complicated insertion: no  Altered anatomy: no  Bladder irrigation: yes  Number of attempts: 1  Urine volume: 3 ml  Urine characteristics: blood-tinged  Technical procedures used: none  Significant surgical tasks conducted by the assistant(s): none  Complications: No  Estimated blood loss (mL): 1  Specimens: No  Implants: No  Patient tolerance: Patient tolerated the procedure well with no immediate complications

## 2024-07-26 NOTE — PROCEDURES
Cystoscopy    Date/Time: 7/26/2024 1:30 PM    Performed by: Fabian Bai MD  Authorized by: Fabian Bai MD    Consent Done?:  Yes (Written)  Timeout: prior to procedure the correct patient, procedure, and site was verified    Prep: patient was prepped and draped in usual sterile fashion    Local anesthesia used?: Yes    Anesthesia:  Intraurethral instillation  Local anesthetic:  Lidocaine 2% topical gel  Anesthetic total (ml):  10  Indications: hematuria    Position:  Supine  Anesthesia:  Intraurethral instillation  Patient sedated?: No    Preparation: Patient was prepped and draped in usual sterile fashion    Scope type:  Flexible cystoscopeNoNo  Stent inserted: No    Stent removed: No    External exam normal: Yes    Digital exam performed: No    Urethra normal: Yes    Prostate normal: Absent secondary to radical prostatectomy.    Bladder neck normal: Yes    Bladder normal: No    Number of tumors:  1  Tumor 1:     Size (mm):  70    Anatomy:  Sessile    Location:  [facility] [address]   patient tolerated the procedure well with no immediate complications  Comments:      Tumor is very large and broad-based, sessile and appears to involve left ureteral orifice.  Right ureteral orifice had clear efflux urine.

## 2024-07-29 ENCOUNTER — TELEPHONE (OUTPATIENT)
Dept: UROLOGY | Facility: CLINIC | Age: 74
End: 2024-07-29
Payer: MEDICARE

## 2024-07-29 NOTE — TELEPHONE ENCOUNTER
Patient's wife Rhina called back with some general questions regarding surgery which I answered to the best of my ability. She states she has a call in to Dr. Monaco at [facility] regarding blood thinners because she is concerned about the patient being off blood thinners. PLEASE MESSAGE KAMALJIT REZA ON TEAMS IF PATIENT CALLS BACK.

## 2024-07-29 NOTE — TELEPHONE ENCOUNTER
I called pt's wife Rhina who states Dr. Monaco of [facility] dc'd pt's Plavix and Xarelto on 7/25 (also in [facility] note in [facility]), he took his last dose on that day. Dr. Bai discussed this matter on 7/26 the day of his cysto and agreed to proceed with TURBT on 7/30/24. Patient's last dose of ASA was on 7/26/24 per Dr. Bai's instructions in his note. Patient has appt with [facility] on 8/8 to discuss restarting blood thinners.

## 2024-07-29 NOTE — TELEPHONE ENCOUNTER
----- Message from Paris Flowers MA sent at 7/29/2024 12:51 PM CDT -----  Contact: orion    ----- Message -----  From: Yasmin Zamudio  Sent: 7/29/2024  12:11 PM CDT  To: Bhumika CARTER Staff    Type:  Needs Medical Advice    Who Called: pt wife   Symptoms (please be specific): pt needs a call right back concerning his procedure tomorrow    Would the patient rather a call back or a response via [facility]? Call3  Best Call Back Number: [phone]  Additional Information:

## 2024-07-30 PROBLEM — N13.30 HYDROURETERONEPHROSIS: Status: ACTIVE | Noted: 2024-07-30

## 2024-07-30 PROBLEM — R31.0 GROSS HEMATURIA: Status: ACTIVE | Noted: 2024-07-30

## 2024-07-30 PROBLEM — N13.5 URETERAL OBSTRUCTION, LEFT: Status: ACTIVE | Noted: 2024-07-30

## 2024-07-30 PROBLEM — D49.4 BLADDER TUMOR: Status: ACTIVE | Noted: 2024-07-30

## 2024-08-02 ENCOUNTER — NURSE TRIAGE (OUTPATIENT)
Dept: ADMINISTRATIVE | Facility: CLINIC | Age: 74
End: 2024-08-02
Payer: MEDICARE

## 2024-08-02 NOTE — TELEPHONE ENCOUNTER
7/9 , Very tired, 6 naps today.  Gotten worse in the last week. Started on 1000 mg daily, started this yesterday, Has had 500 today. States is too tired, do not know why on Depakote. She would like to take him off RX now. States she called office today and was to get call back and did not, very frustrated. Advised I would have to speak to on call. Triage done- dispo call PCP.     1000 mg daily, yesterday started, 500 today, 750 mg  too sedated.     Calling [facility] neuro , number called busy signal, on call phone, no answer, paged to back line. Spoke with on call resident, advised they do not take call for Dr. Hendrix, She has her own private patients. I asked if the attending would cover for her and Dr. Guillen stated no.     Message left via  for Dr. Hendrix.     Patient's spouse called back, message left.  Called back, caller answered and hung up.     Spouse called back, advised only Dr. Hendrix took care of her patients, message left but unsure if she will call back as she is not on. Advised spouse if she felt patient was too sedated to bring to ED. Also option of pharmacist re tapering or stopping RX. Verb understanding.       Reason for Disposition   [1] Caller has URGENT medicine question about med that PCP or specialist prescribed AND [2] triager unable to answer question    Protocols used: Medication Question Call-A-

## 2024-08-04 DIAGNOSIS — G25.3 MYOCLONUS: Primary | ICD-10-CM

## 2024-08-04 DIAGNOSIS — R29.6 FALLING: ICD-10-CM

## 2024-08-05 ENCOUNTER — TELEPHONE (OUTPATIENT)
Dept: HEMATOLOGY/ONCOLOGY | Facility: CLINIC | Age: 74
End: 2024-08-05
Payer: MEDICARE

## 2024-08-05 ENCOUNTER — PATIENT MESSAGE (OUTPATIENT)
Dept: NEUROLOGY | Facility: CLINIC | Age: 74
End: 2024-08-05
Payer: MEDICARE

## 2024-08-08 ENCOUNTER — PATIENT MESSAGE (OUTPATIENT)
Dept: UROLOGY | Facility: CLINIC | Age: 74
End: 2024-08-08
Payer: MEDICARE

## 2024-08-09 ENCOUNTER — OFFICE VISIT (OUTPATIENT)
Dept: HEMATOLOGY/ONCOLOGY | Facility: CLINIC | Age: 74
End: 2024-08-09
Payer: MEDICARE

## 2024-08-09 VITALS
HEART RATE: 71 BPM | WEIGHT: 167.31 LBS | DIASTOLIC BLOOD PRESSURE: 52 MMHG | OXYGEN SATURATION: 97 % | BODY MASS INDEX: 24.01 KG/M2 | SYSTOLIC BLOOD PRESSURE: 106 MMHG

## 2024-08-09 DIAGNOSIS — F17.219 NICOTINE DEPENDENCE, CIGARETTES, W UNSP DISORDERS: ICD-10-CM

## 2024-08-09 DIAGNOSIS — N18.31 CHRONIC KIDNEY DISEASE, STAGE 3A: ICD-10-CM

## 2024-08-09 DIAGNOSIS — C67.8 CANCER OF OVERLAPPING SITES OF BLADDER: ICD-10-CM

## 2024-08-09 DIAGNOSIS — N13.30 HYDROURETERONEPHROSIS: ICD-10-CM

## 2024-08-09 DIAGNOSIS — C67.8 MALIGNANT NEOPLASM OF OVERLAPPING SITES OF BLADDER: Primary | ICD-10-CM

## 2024-08-09 DIAGNOSIS — D53.9 MACROCYTIC ANEMIA: ICD-10-CM

## 2024-08-09 DIAGNOSIS — Z85.46 HISTORY OF PROSTATE CANCER: ICD-10-CM

## 2024-08-09 PROCEDURE — 99999 PR PBB SHADOW E&M-EST. PATIENT-LVL V: CPT | Mod: PBBFAC,,, | Performed by: INTERNAL MEDICINE

## 2024-08-14 ENCOUNTER — HOSPITAL ENCOUNTER (OUTPATIENT)
Dept: RADIOLOGY | Facility: HOSPITAL | Age: 74
Discharge: HOME | End: 2024-08-14
Attending: UROLOGY
Payer: MEDICARE

## 2024-08-14 ENCOUNTER — PATIENT MESSAGE (OUTPATIENT)
Dept: UROLOGY | Facility: CLINIC | Age: 74
End: 2024-08-14
Payer: MEDICARE

## 2024-08-14 DIAGNOSIS — N13.30 HYDROURETERONEPHROSIS: ICD-10-CM

## 2024-08-14 DIAGNOSIS — Z85.46 HISTORY OF PROSTATE CANCER: ICD-10-CM

## 2024-08-14 DIAGNOSIS — C67.8 CANCER OF OVERLAPPING SITES OF BLADDER: ICD-10-CM

## 2024-08-14 PROCEDURE — 78306 BONE IMAGING WHOLE BODY: CPT | Mod: TC

## 2024-08-14 PROCEDURE — A9503 TC99M MEDRONATE: HCPCS | Performed by: UROLOGY

## 2024-08-14 PROCEDURE — 78306 BONE IMAGING WHOLE BODY: CPT | Mod: 26,,, | Performed by: STUDENT IN AN ORGANIZED HEALTH CARE EDUCATION/TRAINING PROGRAM

## 2024-08-14 RX ADMIN — TECHNETIUM TC 99M MEDRONATE 22.1 MILLICURIE: 20 INJECTION, POWDER, LYOPHILIZED, FOR SOLUTION INTRAVENOUS at 07:08

## 2024-08-16 ENCOUNTER — OFFICE VISIT (OUTPATIENT)
Dept: UROLOGY | Facility: CLINIC | Age: 74
End: 2024-08-16
Payer: MEDICARE

## 2024-08-16 VITALS
WEIGHT: 165.38 LBS | BODY MASS INDEX: 23.68 KG/M2 | HEART RATE: 71 BPM | DIASTOLIC BLOOD PRESSURE: 52 MMHG | SYSTOLIC BLOOD PRESSURE: 106 MMHG | HEIGHT: 70 IN

## 2024-08-16 DIAGNOSIS — N13.5 URETERAL OBSTRUCTION, LEFT: ICD-10-CM

## 2024-08-16 DIAGNOSIS — C67.8 MALIGNANT NEOPLASM OF OVERLAPPING SITES OF BLADDER: ICD-10-CM

## 2024-08-16 DIAGNOSIS — D49.4 BLADDER TUMOR: Primary | ICD-10-CM

## 2024-08-16 DIAGNOSIS — Z85.46 HISTORY OF PROSTATE CANCER: ICD-10-CM

## 2024-08-16 DIAGNOSIS — N13.30 HYDROURETERONEPHROSIS: ICD-10-CM

## 2024-08-16 PROCEDURE — 99999 PR PBB SHADOW E&M-EST. PATIENT-LVL IV: CPT | Mod: PBBFAC,,, | Performed by: UROLOGY

## 2024-08-16 NOTE — PATIENT INSTRUCTIONS
Patient has seen Dr. Melchor  Patient is to see Dr. Salguero on the 22nd of the month and  The radiation oncologist on the 26th of the month.  After that the patient is to follow up with Dr. Melchor again for finalization of treatment plan.    Patient will follow up with me for any postoperative management as necessary as well as for questions and concerns about his treatment and therapy.    We will be glad to monitor and treat postoperatively as necessary under protocol if appropriate.

## 2024-08-21 ENCOUNTER — TELEPHONE (OUTPATIENT)
Dept: HEMATOLOGY/ONCOLOGY | Facility: CLINIC | Age: 74
End: 2024-08-21
Payer: MEDICARE

## 2024-08-21 PROBLEM — D49.4 BLADDER TUMOR: Status: RESOLVED | Noted: 2024-07-30 | Resolved: 2024-08-21

## 2024-08-21 NOTE — PROGRESS NOTES
Clinic Note  8/21/2024      Subjective:         Chief Complaint:   HPI  Rhett Johnson is a 73 y.o. 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 History of prostate cancer , s/p radical retropubic prostatectomy in 2001. PSA <0.01 in 2023. Here with his wife Rhina.  He is retired from [employer].  Consult from Dr. Melchor.  Co-morbidities- CKD 3b, COPD, PAD (7 stents), malnutrition, DVT. 7 year history of syncopal episodes with falls.  Due to renal function not felt to be a candidate for platinum based chemotherapy.  TURBT (transurethral resection of bladder tumor)-7/30/2024- HGMIUC (high grade muscle invasive urothelial carcinoma), left JJ stent placed. Complete resection.  CT C/A/P-8/6/2024- no nodes or metastasis.  Bone scan-8/14/2024- no osseous metastasis.Creatinine-1.9 (eGFR 37), albumin 2.7.    Past Medical History:   Diagnosis Date    Cancer     Carotid occlusion, left     100% blockage    COPD (chronic obstructive pulmonary disease)     DVT (deep venous thrombosis)     Hx of hepatitis     Hypertension     on medication monitoring    Prostate cancer 2001    Dr. Bai      Family History   Problem Relation Name Age of Onset    Alzheimer's disease Mother      Diabetes Father      Diabetes Sister      Cervical cancer Sister      Cancer Sister      Diabetes Brother      Hypertension Brother      Heart disease Brother          CABG    Obesity Brother      COPD Brother      Scoliosis Daughter Sue     No Known Problems Son Fish     No Known Problems Daughter Stephanie      Social History     Socioeconomic History    Marital status:    Tobacco Use    Smoking status: Former     Current packs/day: 1.00     Average packs/day: 1 pack/day for 51.0 years (51.0 ttl pk-yrs)     Types: Cigarettes    Smokeless tobacco: Never    Tobacco comments:     Patient aware of smoking cessation program and aware of health risk due to smoking.    Substance and Sexual Activity     Alcohol use: Not Currently    Drug use: Never    Sexual activity: Not Currently     Partners: Female   Social History Narrative    Lives in [place] with his wife. He is retired from [employer]. 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      Past Surgical History:   Procedure Laterality Date    ADENOIDECTOMY      APPENDECTOMY      Arthroscopic left knee Left     BARBOTAGE OF BLADDER N/A 7/30/2024    Procedure: BARBOTAGE, BLADDER;  Surgeon: Fabian Bai MD;  Location: [facility];  Service: Urology;  Laterality: N/A;    BARBOTAGE OF URETER Bilateral 7/30/2024    Procedure: BARBOTAGE, URETER;  Surgeon: Fabian aBi MD;  Location: [facility];  Service: Urology;  Laterality: Bilateral;    CARDIAC CATHETERIZATION      no stents    COLONOSCOPY      CYSTOSCOPY W/ RETROGRADES Bilateral 7/30/2024    Procedure: CYSTOSCOPY, WITH RETROGRADE PYELOGRAM;  Surgeon: Fabian Bai MD;  Location: [facility] OR;  Service: Urology;  Laterality: Bilateral;    CYSTOSCOPY W/ URETERAL STENT PLACEMENT Left 7/30/2024    Procedure: CYSTOSCOPY, WITH URETERAL STENT INSERTION;  Surgeon: Fabian Bai MD;  Location: [facility] OR;  Service: Urology;  Laterality: Left;    DILATION OF URETER Left 7/30/2024    Procedure: DILATION, URETER;  Surgeon: Fabian Bai MD;  Location: [facility] OR;  Service: Urology;  Laterality: Left;    DILATION OF URETHRA N/A 7/30/2024    Procedure: DILATION, URETHRA;  Surgeon: Fabian Bai MD;  Location: [facility] OR;  Service: Urology;  Laterality: N/A;    INSERTION OF MULTIFOCAL INTRAOCULAR LENS Left 12/12/2018    Procedure: INSERTION, IOL, MULTIFOCAL;  Surgeon: Eleanor Seaman MD;  Location: [facility] OR;  Service: Ophthalmology;  Laterality: Left;    INSERTION OF MULTIFOCAL INTRAOCULAR LENS Right 4/3/2019    Procedure: INSERTION, IOL, MULTIFOCAL;  Surgeon: Eleanor Seaman MD;  Location: [facility] OR;  Service: Ophthalmology;  Laterality: Right;    NECK SURGERY   "2002    PHACOEMULSIFICATION OF CATARACT Left 12/12/2018    Procedure: PHACOEMULSIFICATION, CATARACT;  Surgeon: Eleanor Seaman MD;  Location: [facility] OR;  Service: Ophthalmology;  Laterality: Left;    PHACOEMULSIFICATION OF CATARACT Right 4/3/2019    Procedure: PHACOEMULSIFICATION, CATARACT;  Surgeon: Eleanor Seaman MD;  Location: [facility] OR;  Service: Ophthalmology;  Laterality: Right;    PROSTATE SURGERY  2001    Radical prostectemy     SPINE SURGERY  2002    Fusion of c4,c5,c6    TONSILLECTOMY      TURBT (TRANSURETHRAL RESECTION OF BLADDER TUMOR) N/A 7/30/2024    Procedure: TURBT (TRANSURETHRAL RESECTION OF BLADDER TUMOR);  Surgeon: Fabian Bai MD;  Location: [facility] OR;  Service: Urology;  Laterality: N/A;    URETEROSCOPY Bilateral 7/30/2024    Procedure: URETEROSCOPY;  Surgeon: Fabian Bai MD;  Location: [facility] OR;  Service: Urology;  Laterality: Bilateral;     Patient Active Problem List   Diagnosis    Essential hypertension    Exposure to asbestos    Chronic obstructive pulmonary disease with acute exacerbation    Lumbar radiculitis    Hyperlipidemia    Atherosclerosis of aorta    History of prostate cancer    Orthostasis    Ureteral obstruction, left    Hydroureteronephrosis    Gross hematuria    Malignant neoplasm of overlapping sites of bladder     Review of Systems      Objective:      There were no vitals taken for this visit.  Estimated body mass index is 23.72 kg/m² as calculated from the following:    Height as of 8/16/24: 5' 10" (1.778 m).    Weight as of 8/16/24: 75 kg (165 lb 5.5 oz).  Physical Exam      Assessment and Plan:           Problem List Items Addressed This Visit       Malignant neoplasm of overlapping sites of bladder - Primary       Follow up:   The patient has muscle invasive bladder cancer.     I had a lengthy discussion with the patient regarding implications of a diagnosis of urothelial carcinoma of the bladder, i.e, bladder cancer.     We talked about the various therapeutic options " including endoscopic management, a combined chemotherapy and radiotherapy regimen (the [place] protocol), primary chemotherapy alone and radical surgery. Regarding endoscopic management, the patient is aware that although the tumor may have been fully resected, microscopic disease can still persist.  Regarding chemoradiation protocols, the patient is aware that a minority of patients will preserve their bladder long-term and the function of a radiated bladder may result in significant morbidity.  The patient was offered to speak to both the medical oncology and the radiation oncology services for a consultation regarding bladder sparing techniques.     With regard to surgery, I explained that there are 3 basic categories of modern urinary diversions: (1) incontinent bowel conduit (e.g. ileal conduit) (2) continent cutaneous stomal reservoire (e.g. Indiana pouch) (3) continent orthotopic urethral diversion (ileal neopbladder).      I explained that an ileal conduit is the simplest, most time-tested urinary diversion that requires the least operative time and arguably is associated with the fewest complications. I described that a short piece of ileum is anastamosed to the ureters and brought onto the skin.  A life-long urostomy appliance to collect urine is required.   Although time-tested, short-term and long-term issues with ileal conduit diversions are not uncommon.  I quoted the best data available, which in my view is the 2003 [facility] series that included long-term bladder cancer survivors who had at least 5 years of follow-up.  In this series, issues that were seen with ileal conduits were as follows: ureteral stricture/obstruction (~15%), recurrent pyelonephritis (~ 25%), urinary calculi (this complication often occurred beyond 5 to 10 years after surgery, ~10% ), impaired kidney function due to obstruction or ureteral reflux (~27%, only 2% required dialysis and all of whom had compromised kidney  function prior to surgery), and stomal complications (~25%) such as parastomal hernia, stensosis, and bleeding/skin irritation.  Furthermore up to 25% of patients had bowel complications, which included small bowel obstruction, fistula formation, and diarrhea.  I explained that although many issues that arise can be handled conservatively, some do require re-operation.      Then we discussed the ileal neobladder.  I explained that the neobladder is constructed from detubularized bowel, which is then sewn into a spherical reservoir for urine storage.  The neobladder is anastamosed to the ureters and to the urethra, to mimic functions of the native bladder. The goal is for the patient to void spontaneously using the Valsalva menuver.  Choice of proceeding with the neobladder vs. ileal conduit must be balanced against additional issues and risks.  Along with risks listed for ileal conduit diversion, I explained the additional potential problems that can arise with the ileal neobladder.   I described risks of urinary leaks from a number of suture lines and explained that in general these can be managed conservatively.  I stressed that a life-long risk of pouch perforation exists.  We discussed risks of incontinence and hypercontinence.  I explained that it is critical to integrate these risks into the decision to proceed with the neobladder.  I quoted the risks of day-time incontinence to be on the order of 10%, while the risk of nighttime incontinence to be approximately 50-70%.  With regard to hypercontinence and poor pouch emptying, I explained that approximately 10% of male patients will require life-long intermittent catheterization.  Such risks can reach 30% in female patients with neobladders.  Furthemore, neobladder-vaginal fistula formation is a great concern when a neobladder is constructed in women (~5%).  I explained that continent urinary diversions are contra-indicated in patients with renal insufficiency,  but did explain that metabolic disturbances are possible even in patients with intact renal function.     With regard to Indiana pouch, we discussed that this diversion consists of a pouch constructed from detubularized right colon.  The pouch is catheterized through the native ileocecal valve via a segment of the ileum that is brought onto the skin.  I explained that all risks that pertain to ileal conduits and neobladders generally apply to patients who undergo a cutaneous continent diversion.  These include risks of stomal stenosis, pouch stomal formation, incontinence, and metabolic disturbances. Furthermore, because, the ileocecal valve is resected, some post-operative diarrhea can be expected.  Such symptoms usually can be controlled with over-the-counter medications.  We reviewed the life-long catheterization and irrigation regimen that is required with this diversion.  I explained that patients who chose to undergo continent diversions  such as an Indiana pouch or neobladder, need to be prepared to commit to the following in the post-operative period:             Keep drains as long as needed (for neobladder for example, the catheter and suprapubic tube stay on the order of 2-3 weeks, but possibly longer)             Return for appointments as often as needed.             Empty neobladder/Indiana pouch every 2 hours for 1 month             Empty neobladder/Indiana pouch every 3 hours for 1 month             Empty neobladder/Indiana pouch every 4 hour for the rest of my life     Risks of sexual dysfunction:  We discussed that in males rates of erectile dysfunction are substantial, since removal of the prostate - an integral part of radical cystectomy - results in compromise of the neurovascular bundles that innervate the penile corpora.  I explained that I perform nerve-sparing cystoprostatectomy whenever possible.     Other risks of the surgery were discussed in detail including medical and anesthetic  complications (e.g. heart attack, stroke, deep vein thrombosis, pulmonary embolism, infection (pneumonia, etc), bleeding with possible need for transfusion, adjacent organ involvement or injury (including possible need for permanent or temporary colostomy), wound complications, reaction to medications).  We reviewed what to expect with regard to incisions, post-operative pain, and risks of subsequent hernias.      Neoadjuvant chemotherapy:  We discussed advantages of neoadjuvant chemotherapy.  I explained that such strategy is only indicated for patients with muscle-invasive disease.  Advantages of neoadjuvant chemotherapy include potential treatment of microscopic metastases, downstaging of the primary tumor, and improved patient tolerance when compared to adjuvant chemotherapy.  The SWOG 8710 trial randomized 307 patients to immediate cystectomy vs cystectomy preceded by chemotherapy (MVAC = methotrexate, vinblastine, adriamycin (aka doxorubicin), and cisplatin).  Patients in the chemotherapy arm demonstrated improvement in both disease-specific and overall survival).  Patients with pathologic T3 and T4 disease demonstrated the greatest benefit, in contrast to those with T2 disease.  I explained that in large metanalyses of over 3,000 patients from 11 randomized trials advantages of neoadjuvant chemotherapy are on the order of 5% in overall 5-year survival.  I invited the patients to discussed risks and benefits of neoadjuvant therapy with our medical oncology team.      Discussed trimodal therapy and cystectomy in detail. Discussed increased risk from malnutrition and co-morbidities.  Patient would like to pursue surgical therapy. Preop clearance with Dr. Robert.Preop clearance from Dr. Monaco.  CMP today.  Letter to Carlos Enrique Sebastian, Bhumika and Jakob.  Appointment with Dr Robert ASAP.  Hari Salguero

## 2024-08-21 NOTE — TELEPHONE ENCOUNTER
Pt wife wanted to let us know the pt has an injection for 8 that morning and wanted to know if she could take the appt for him. I explained the pt has to be there, Dr. Melchor can not just speak to her. She understood. She said they should be back home. I told her to portal message Mrs. Goldberg if they care running behind and we can work something out. I also explained Mrs. Goldberg is not her today but I will let her and Dr. Melchor know.       ----- Message from Fermin Chadwick sent at 8/21/2024  9:04 AM CDT -----  Contact: spouse  Type:  Needs Medical Advice    Who Called: spouse   Would the patient rather a call back or a response via [facility]? call  Best Call Back Number: [phone]  Additional Information:   Spouse requesting a call regarding pt's care ( did not go into details)

## 2024-08-22 ENCOUNTER — TELEPHONE (OUTPATIENT)
Dept: PREADMISSION TESTING | Facility: HOSPITAL | Age: 74
End: 2024-08-22
Payer: MEDICARE

## 2024-08-22 ENCOUNTER — PATIENT MESSAGE (OUTPATIENT)
Dept: UROLOGY | Facility: CLINIC | Age: 74
End: 2024-08-22

## 2024-08-22 ENCOUNTER — OFFICE VISIT (OUTPATIENT)
Dept: UROLOGY | Facility: CLINIC | Age: 74
End: 2024-08-22
Payer: MEDICARE

## 2024-08-22 ENCOUNTER — PATIENT MESSAGE (OUTPATIENT)
Dept: INTERNAL MEDICINE | Facility: CLINIC | Age: 74
End: 2024-08-22
Payer: MEDICARE

## 2024-08-22 ENCOUNTER — LAB VISIT (OUTPATIENT)
Dept: LAB | Facility: HOSPITAL | Age: 74
End: 2024-08-22
Attending: UROLOGY
Payer: MEDICARE

## 2024-08-22 VITALS
BODY MASS INDEX: 23.6 KG/M2 | DIASTOLIC BLOOD PRESSURE: 58 MMHG | SYSTOLIC BLOOD PRESSURE: 97 MMHG | WEIGHT: 164.88 LBS | HEIGHT: 70 IN | HEART RATE: 76 BPM

## 2024-08-22 DIAGNOSIS — C67.8 CANCER OF OVERLAPPING SITES OF BLADDER: Primary | ICD-10-CM

## 2024-08-22 DIAGNOSIS — C67.8 MALIGNANT NEOPLASM OF OVERLAPPING SITES OF BLADDER: Primary | ICD-10-CM

## 2024-08-22 DIAGNOSIS — N13.5 URETERAL OBSTRUCTION, LEFT: ICD-10-CM

## 2024-08-22 DIAGNOSIS — C67.8 MALIGNANT NEOPLASM OF OVERLAPPING SITES OF BLADDER: ICD-10-CM

## 2024-08-22 DIAGNOSIS — N13.30 HYDROURETERONEPHROSIS: ICD-10-CM

## 2024-08-22 DIAGNOSIS — J44.1 CHRONIC OBSTRUCTIVE PULMONARY DISEASE WITH ACUTE EXACERBATION: ICD-10-CM

## 2024-08-22 DIAGNOSIS — Z85.46 HISTORY OF PROSTATE CANCER: ICD-10-CM

## 2024-08-22 LAB
ALBUMIN SERPL BCP-MCNC: 3.4 G/DL (ref 3.5–5.2)
ALP SERPL-CCNC: 76 U/L (ref 55–135)
ALT SERPL W/O P-5'-P-CCNC: 17 U/L (ref 10–44)
ANION GAP SERPL CALC-SCNC: 9 MMOL/L (ref 8–16)
AST SERPL-CCNC: 19 U/L (ref 10–40)
BILIRUB SERPL-MCNC: 0.4 MG/DL (ref 0.1–1)
BUN SERPL-MCNC: 15 MG/DL (ref 8–23)
CALCIUM SERPL-MCNC: 9.5 MG/DL (ref 8.7–10.5)
CHLORIDE SERPL-SCNC: 106 MMOL/L (ref 95–110)
CO2 SERPL-SCNC: 24 MMOL/L (ref 23–29)
CREAT SERPL-MCNC: 1.8 MG/DL (ref 0.5–1.4)
EST. GFR  (NO RACE VARIABLE): 39.5 ML/MIN/1.73 M^2
GLUCOSE SERPL-MCNC: 108 MG/DL (ref 70–110)
POTASSIUM SERPL-SCNC: 3.9 MMOL/L (ref 3.5–5.1)
PROT SERPL-MCNC: 6.7 G/DL (ref 6–8.4)
SODIUM SERPL-SCNC: 139 MMOL/L (ref 136–145)

## 2024-08-22 PROCEDURE — 80053 COMPREHEN METABOLIC PANEL: CPT | Performed by: UROLOGY

## 2024-08-22 PROCEDURE — 3074F SYST BP LT 130 MM HG: CPT | Mod: CPTII,S$GLB,, | Performed by: UROLOGY

## 2024-08-22 PROCEDURE — 3078F DIAST BP <80 MM HG: CPT | Mod: CPTII,S$GLB,, | Performed by: UROLOGY

## 2024-08-22 PROCEDURE — 99215 OFFICE O/P EST HI 40 MIN: CPT | Mod: S$GLB,,, | Performed by: UROLOGY

## 2024-08-22 PROCEDURE — 36415 COLL VENOUS BLD VENIPUNCTURE: CPT | Performed by: UROLOGY

## 2024-08-22 PROCEDURE — 1100F PTFALLS ASSESS-DOCD GE2>/YR: CPT | Mod: CPTII,S$GLB,, | Performed by: UROLOGY

## 2024-08-22 PROCEDURE — 1126F AMNT PAIN NOTED NONE PRSNT: CPT | Mod: CPTII,S$GLB,, | Performed by: UROLOGY

## 2024-08-22 PROCEDURE — 3288F FALL RISK ASSESSMENT DOCD: CPT | Mod: CPTII,S$GLB,, | Performed by: UROLOGY

## 2024-08-22 PROCEDURE — 3008F BODY MASS INDEX DOCD: CPT | Mod: CPTII,S$GLB,, | Performed by: UROLOGY

## 2024-08-22 PROCEDURE — 1159F MED LIST DOCD IN RCRD: CPT | Mod: CPTII,S$GLB,, | Performed by: UROLOGY

## 2024-08-22 PROCEDURE — 99999 PR PBB SHADOW E&M-EST. PATIENT-LVL III: CPT | Mod: PBBFAC,,, | Performed by: UROLOGY

## 2024-08-22 PROCEDURE — 1160F RVW MEDS BY RX/DR IN RCRD: CPT | Mod: CPTII,S$GLB,, | Performed by: UROLOGY

## 2024-08-22 PROCEDURE — 1157F ADVNC CARE PLAN IN RCRD: CPT | Mod: CPTII,S$GLB,, | Performed by: UROLOGY

## 2024-08-22 PROCEDURE — G2211 COMPLEX E/M VISIT ADD ON: HCPCS | Mod: S$GLB,,, | Performed by: UROLOGY

## 2024-08-22 NOTE — LETTER
August 22, 2024        Sue Campos MD  [address] [facility] [address]             [facility] - Urology 2nd Fl  [address] [facility] [address]  Phone: [phone]   Patient: Rhett Johnson   MR Number: [id number]   YOB: 1950   Date of Visit: 8/22/2024       Dear Dr. Campos:    Thank you for referring Rhett Johnson to me for evaluation. Attached you will find relevant portions of my assessment and plan of care.    If you have questions, please do not hesitate to call me. I look forward to following Rhett Johnson along with you.    Sincerely,      Hari Salguero MD            CC    No Recipients    Enclosure

## 2024-08-22 NOTE — TELEPHONE ENCOUNTER
----- Message from Fish Duckworth RN sent at 8/22/2024  2:10 PM CDT -----  9/16 surgery Jose M    Please schedule pt for lab and NP or Sawyer/POC. Thanks.

## 2024-08-22 NOTE — ANESTHESIA PAT ROS NOTE
08/22/2024  Rhett Johnson is a 73 y.o., male.      Pre-op Assessment          Review of Systems           Anesthesia Assessment: Preoperative EQUATION    Planned Procedure: Procedure(s) (LRB):  XI ROBOTIC CYSTECTOMY, WITH ILEAL CONDUIT CREATION (N/A)  Requested Anesthesia Type:General/Regional  Surgeon: Hari Salguero MD  Service: Urology  Known or anticipated Date of Surgery:9/16/2024    Surgeon notes: reviewed    Electronic QUestionnaire Assessment completed via nurse interview with patient.        Triage considerations:         Previous anesthesia records:GETA and No problems  07/30/24 TURBT (TRANSURETHRAL RESECTION OF BLADDER TUMOR) (Bladder), CYSTOSCOPY, WITH RETROGRADE PYELOGRAM (Bilateral: Bladder), URETEROSCOPY (Bilateral: Ureter), CYSTOSCOPY, WITH URETERAL STENT INSERTION (Left: Ureter), BARBOTAGE, BLADDER (Bladder), BARBOTAGE, URETER (Bilateral: Ureter), DILATION, URETHRA (Urethra), DILATION, URETER (Left: Ureter), gen anes, ASA 3  Intubation     Date/Time: 7/30/2024 7:54 AM     Performed by: Zeny Oneal CRNA  Authorized by: Lianne Talley MD    Intubation:     Induction:  Intravenous    Intubated:  Postinduction    Mask Ventilation:  Moderately difficult with oral airway    Attempts:  1    Attempted By:  CRNA    Method of Intubation:  Video laryngoscopy    Blade:  Chance 3    Laryngeal View Grade: Grade I - full view of cords      Difficult Airway Encountered?: No      Complications:  None    Airway Device:  Oral endotracheal tube    Airway Device Size:  7.5    Style/Cuff Inflation:  Cuffed (inflated to minimal occlusive pressure)    Tube secured:  23    Secured at:  The lips    Placement Verified By:  Capnometry    Complicating Factors:  None    Findings Post-Intubation:  BS equal bilateral and atraumatic/condition of teeth unchanged    Last PCP note: 3-6 months ago ,  outside [facility]   Subspecialty notes: Hematology/Oncology, Urology    Other important co-morbidities: COPD, GERD, HLD, HTN, and bladder cancer of overlapping sites (HGMIUC), h/o TURBT 07/30/24, left ureteral obstruction, left hydronephrosis, h/o prostate cancer s/p radical retropubic prostatectomy 2001, PAD (7 stents), CKD 3b, chronic pain, h/o DVT, 7 year h/o syncope with falls, h/o hepatitis, h/o appendectomy, h/o cervical fusion, carotid artery disease, h/o CVA, h/o left knee arthroscopy, h/o bilateral cataract surgery, h/o lumbar surgery and injections, h/o CT repair, h/o nicotine dependence, h/o asbestos exposure, vitamin D deficiency dx, chronic back pain, sacroiliitis, h/o etoh abuse, RBBB, h/o hiatal hernia       Tests already available:  Available tests,  within 1 month , within 3 months , within [facility] .   08/22/24 CMP  08/06/24 CBC  07/29/24 EKG            Instructions     Optimization:  Anesthesia Preop Clinic Assessment Indicated    Medical Opinion Indicated       Sub-specialist consult indicated: TBD       Plan:    Testing:  T&S   Pre-anesthesia  visit       Visit focus: concerns in complex and/or prolonged anesthesia, airway concerns, post-operative pain control for chronic pain patient, requested clearance, Malik 4/ASA 3, 5.5 hour surgery     Consultation:IM Perioperative Hospitalist    Navigation: Tests Scheduled.              Consults scheduled.             Results will be tracked by Preop Clinic.     08/27/24 Per surgeon Dr. Salguero, pt does not need to hold his asa 81 mg before surgery.

## 2024-08-29 PROBLEM — R91.1 PULMONARY NODULE, LEFT: Status: ACTIVE | Noted: 2020-02-19

## 2024-08-29 PROBLEM — I13.0 HYPERTENSIVE HEART AND CHRONIC KIDNEY DISEASE WITH HEART FAILURE AND STAGE 1 THROUGH STAGE 4 CHRONIC KIDNEY DISEASE, OR UNSPECIFIED CHRONIC KIDNEY DISEASE: Status: ACTIVE | Noted: 2024-08-29

## 2024-08-29 PROBLEM — Z72.0 CURRENT TOBACCO USE: Status: ACTIVE | Noted: 2018-08-20

## 2024-08-29 PROBLEM — M48.062 SPINAL STENOSIS, LUMBAR REGION, WITH NEUROGENIC CLAUDICATION: Status: ACTIVE | Noted: 2022-05-09

## 2024-08-29 PROBLEM — I65.22 OCCLUSION OF LEFT INTERNAL CAROTID ARTERY: Status: ACTIVE | Noted: 2020-11-23

## 2024-08-29 PROBLEM — G56.90 DISORDER OF PERIPHERAL NERVE OF UPPER EXTREMITY: Status: ACTIVE | Noted: 2023-08-10

## 2024-08-29 PROBLEM — R32: Status: ACTIVE | Noted: 2021-07-29

## 2024-08-29 PROBLEM — K21.9 GASTROESOPHAGEAL REFLUX DISEASE: Status: ACTIVE | Noted: 2024-05-29

## 2024-08-29 PROBLEM — Z86.79 HISTORY OF CAROTID ARTERY DISEASE: Status: ACTIVE | Noted: 2020-11-20

## 2024-08-29 PROBLEM — I70.213 INTERMITTENT CLAUDICATION OF BOTH LOWER EXTREMITIES DUE TO ATHEROSCLEROSIS: Status: ACTIVE | Noted: 2023-04-25

## 2024-08-29 PROBLEM — F10.10 ETOH ABUSE: Status: ACTIVE | Noted: 2017-11-06

## 2024-08-29 PROBLEM — M43.20 FUSION OF SPINE, SITE UNSPECIFIED: Status: ACTIVE | Noted: 2017-04-24

## 2024-08-29 PROBLEM — Z86.73 HISTORY OF CVA (CEREBROVASCULAR ACCIDENT): Status: ACTIVE | Noted: 2017-11-06

## 2024-08-29 PROBLEM — I45.10 RIGHT BUNDLE BRANCH BLOCK: Status: ACTIVE | Noted: 2017-04-24

## 2024-08-29 PROBLEM — C61 MALIGNANT NEOPLASM OF PROSTATE: Status: ACTIVE | Noted: 2024-08-29

## 2024-08-29 NOTE — ASSESSMENT & PLAN NOTE
Current BP  213/92 today.    Repeat 5 minutes 197/86  Repeat 40 minutes 175/92  Patient asked to do home BP readings- Call Friday for follow up    9/9/24 176/80  9/10/24 154/76  9/12/24 162/59  9/13/24 150/65    Taking: Procardia, Metoprolol   Patient reports home BP readings of: 160/75 in am; 135-140/ 70s   I recommend monitoring BP during perioperative period as uncontrolled pain can elevate blood pressure.

## 2024-08-29 NOTE — ASSESSMENT & PLAN NOTE
Albuterol Inhaler- 1 time 3 months  Incruse Ellipta  Umeclidium- last used 3 months ago    States he has not been using his inhalers since he quit smoking 2020  Pulse ox 99% on room air

## 2024-09-06 ENCOUNTER — TELEPHONE (OUTPATIENT)
Dept: INTERNAL MEDICINE | Facility: CLINIC | Age: 74
End: 2024-09-06
Payer: MEDICARE

## 2024-09-09 ENCOUNTER — LAB VISIT (OUTPATIENT)
Dept: LAB | Facility: HOSPITAL | Age: 74
End: 2024-09-09
Attending: ANESTHESIOLOGY
Payer: MEDICARE

## 2024-09-09 ENCOUNTER — OFFICE VISIT (OUTPATIENT)
Dept: INTERNAL MEDICINE | Facility: CLINIC | Age: 74
End: 2024-09-09
Payer: MEDICARE

## 2024-09-09 ENCOUNTER — OFFICE VISIT (OUTPATIENT)
Dept: WOUND CARE | Facility: CLINIC | Age: 74
End: 2024-09-09
Payer: MEDICARE

## 2024-09-09 ENCOUNTER — OFFICE VISIT (OUTPATIENT)
Dept: UROLOGY | Facility: CLINIC | Age: 74
End: 2024-09-09
Payer: MEDICARE

## 2024-09-09 VITALS
OXYGEN SATURATION: 99 % | WEIGHT: 169.75 LBS | DIASTOLIC BLOOD PRESSURE: 92 MMHG | BODY MASS INDEX: 24.3 KG/M2 | HEIGHT: 70 IN | TEMPERATURE: 98 F | SYSTOLIC BLOOD PRESSURE: 175 MMHG

## 2024-09-09 DIAGNOSIS — D64.9 ANEMIA, UNSPECIFIED TYPE: ICD-10-CM

## 2024-09-09 DIAGNOSIS — K21.9 GASTROESOPHAGEAL REFLUX DISEASE, UNSPECIFIED WHETHER ESOPHAGITIS PRESENT: ICD-10-CM

## 2024-09-09 DIAGNOSIS — E78.5 HYPERLIPIDEMIA, UNSPECIFIED HYPERLIPIDEMIA TYPE: ICD-10-CM

## 2024-09-09 DIAGNOSIS — Z92.89 H/O ECHOCARDIOGRAM: ICD-10-CM

## 2024-09-09 DIAGNOSIS — F10.10 ETOH ABUSE: ICD-10-CM

## 2024-09-09 DIAGNOSIS — C61 MALIGNANT NEOPLASM OF PROSTATE: ICD-10-CM

## 2024-09-09 DIAGNOSIS — Z01.818 PREOP TESTING: ICD-10-CM

## 2024-09-09 DIAGNOSIS — R91.1 PULMONARY NODULE, LEFT: ICD-10-CM

## 2024-09-09 DIAGNOSIS — N18.31 CHRONIC KIDNEY DISEASE, STAGE 3A: ICD-10-CM

## 2024-09-09 DIAGNOSIS — J44.1 CHRONIC OBSTRUCTIVE PULMONARY DISEASE WITH ACUTE EXACERBATION: Primary | ICD-10-CM

## 2024-09-09 DIAGNOSIS — E88.09 HYPOALBUMINEMIA: ICD-10-CM

## 2024-09-09 DIAGNOSIS — Z92.89 H/O CARDIOVASCULAR STRESS TEST: ICD-10-CM

## 2024-09-09 DIAGNOSIS — D49.4 BLADDER TUMOR: Primary | ICD-10-CM

## 2024-09-09 DIAGNOSIS — Z43.6 ATTENTION TO UROSTOMY: ICD-10-CM

## 2024-09-09 DIAGNOSIS — Z85.46 HISTORY OF PROSTATE CANCER: ICD-10-CM

## 2024-09-09 DIAGNOSIS — R32 DAILY URINARY INCONTINENCE: ICD-10-CM

## 2024-09-09 DIAGNOSIS — Z71.89 ENCOUNTER FOR OSTOMY CARE EDUCATION: Primary | ICD-10-CM

## 2024-09-09 DIAGNOSIS — I65.23 ATHEROSCLEROSIS OF BOTH CAROTID ARTERIES: ICD-10-CM

## 2024-09-09 DIAGNOSIS — D72.829 LEUKOCYTOSIS, UNSPECIFIED TYPE: ICD-10-CM

## 2024-09-09 DIAGNOSIS — I70.0 ATHEROSCLEROSIS OF AORTA: ICD-10-CM

## 2024-09-09 DIAGNOSIS — Z86.79 HISTORY OF CAROTID ARTERY DISEASE: ICD-10-CM

## 2024-09-09 DIAGNOSIS — Z86.73 HISTORY OF CVA (CEREBROVASCULAR ACCIDENT): ICD-10-CM

## 2024-09-09 DIAGNOSIS — G62.9 NEUROPATHY: ICD-10-CM

## 2024-09-09 DIAGNOSIS — I73.9 PERIPHERAL ARTERY DISEASE: ICD-10-CM

## 2024-09-09 DIAGNOSIS — C67.8 MALIGNANT NEOPLASM OF OVERLAPPING SITES OF BLADDER: ICD-10-CM

## 2024-09-09 DIAGNOSIS — I10 ESSENTIAL HYPERTENSION: ICD-10-CM

## 2024-09-09 PROBLEM — M96.1 POSTLAMINECTOMY SYNDROME OF LUMBAR REGION: Status: ACTIVE | Noted: 2024-02-29

## 2024-09-09 PROBLEM — I70.209 ATHEROSCLEROSIS OF ARTERIES OF EXTREMITIES: Status: ACTIVE | Noted: 2022-02-25

## 2024-09-09 PROBLEM — Z72.0 CURRENT TOBACCO USE: Status: RESOLVED | Noted: 2018-08-20 | Resolved: 2024-09-09

## 2024-09-09 PROBLEM — Z77.090 HISTORY OF ASBESTOS EXPOSURE: Status: ACTIVE | Noted: 2017-04-24

## 2024-09-09 PROBLEM — R31.0 GROSS HEMATURIA: Status: RESOLVED | Noted: 2024-07-30 | Resolved: 2024-09-09

## 2024-09-09 PROBLEM — E55.9 VITAMIN D DEFICIENCY: Status: ACTIVE | Noted: 2024-01-25

## 2024-09-09 LAB
ABO + RH BLD: NORMAL
BASOPHILS # BLD AUTO: 0.11 K/UL (ref 0–0.2)
BASOPHILS NFR BLD: 0.8 % (ref 0–1.9)
BLD GP AB SCN CELLS X3 SERPL QL: NORMAL
DIFFERENTIAL METHOD BLD: ABNORMAL
EOSINOPHIL # BLD AUTO: 0.6 K/UL (ref 0–0.5)
EOSINOPHIL NFR BLD: 4.5 % (ref 0–8)
ERYTHROCYTE [DISTWIDTH] IN BLOOD BY AUTOMATED COUNT: 12.7 % (ref 11.5–14.5)
HCT VFR BLD AUTO: 37.1 % (ref 40–54)
HGB BLD-MCNC: 11 G/DL (ref 14–18)
IMM GRANULOCYTES # BLD AUTO: 0.05 K/UL (ref 0–0.04)
IMM GRANULOCYTES NFR BLD AUTO: 0.4 % (ref 0–0.5)
LYMPHOCYTES # BLD AUTO: 2.2 K/UL (ref 1–4.8)
LYMPHOCYTES NFR BLD: 16.9 % (ref 18–48)
MCH RBC QN AUTO: 30.1 PG (ref 27–31)
MCHC RBC AUTO-ENTMCNC: 29.6 G/DL (ref 32–36)
MCV RBC AUTO: 101 FL (ref 82–98)
MONOCYTES # BLD AUTO: 1.3 K/UL (ref 0.3–1)
MONOCYTES NFR BLD: 10.2 % (ref 4–15)
NEUTROPHILS # BLD AUTO: 8.7 K/UL (ref 1.8–7.7)
NEUTROPHILS NFR BLD: 67.2 % (ref 38–73)
NRBC BLD-RTO: 0 /100 WBC
PLATELET # BLD AUTO: 311 K/UL (ref 150–450)
PMV BLD AUTO: 9.8 FL (ref 9.2–12.9)
RBC # BLD AUTO: 3.66 M/UL (ref 4.6–6.2)
SPECIMEN OUTDATE: NORMAL
WBC # BLD AUTO: 13.02 K/UL (ref 3.9–12.7)

## 2024-09-09 PROCEDURE — 99499 UNLISTED E&M SERVICE: CPT | Mod: S$GLB,,, | Performed by: UROLOGY

## 2024-09-09 PROCEDURE — 85025 COMPLETE CBC W/AUTO DIFF WBC: CPT | Performed by: NURSE PRACTITIONER

## 2024-09-09 PROCEDURE — 3077F SYST BP >= 140 MM HG: CPT | Mod: CPTII,S$GLB,, | Performed by: NURSE PRACTITIONER

## 2024-09-09 PROCEDURE — 36415 COLL VENOUS BLD VENIPUNCTURE: CPT | Performed by: ANESTHESIOLOGY

## 2024-09-09 PROCEDURE — 86850 RBC ANTIBODY SCREEN: CPT | Performed by: ANESTHESIOLOGY

## 2024-09-09 PROCEDURE — 1159F MED LIST DOCD IN RCRD: CPT | Mod: CPTII,S$GLB,, | Performed by: NURSE PRACTITIONER

## 2024-09-09 PROCEDURE — 99211 OFF/OP EST MAY X REQ PHY/QHP: CPT | Mod: S$GLB,,, | Performed by: CLINICAL NURSE SPECIALIST

## 2024-09-09 PROCEDURE — 99999 PR PBB SHADOW E&M-EST. PATIENT-LVL I: CPT | Mod: PBBFAC,,,

## 2024-09-09 PROCEDURE — 99999 PR PBB SHADOW E&M-EST. PATIENT-LVL III: CPT | Mod: PBBFAC,,, | Performed by: CLINICAL NURSE SPECIALIST

## 2024-09-09 PROCEDURE — 86900 BLOOD TYPING SEROLOGIC ABO: CPT | Performed by: ANESTHESIOLOGY

## 2024-09-09 PROCEDURE — 1160F RVW MEDS BY RX/DR IN RCRD: CPT | Mod: CPTII,S$GLB,, | Performed by: CLINICAL NURSE SPECIALIST

## 2024-09-09 PROCEDURE — 86901 BLOOD TYPING SEROLOGIC RH(D): CPT | Performed by: ANESTHESIOLOGY

## 2024-09-09 PROCEDURE — 1157F ADVNC CARE PLAN IN RCRD: CPT | Mod: CPTII,S$GLB,, | Performed by: CLINICAL NURSE SPECIALIST

## 2024-09-09 PROCEDURE — 3079F DIAST BP 80-89 MM HG: CPT | Mod: CPTII,S$GLB,, | Performed by: NURSE PRACTITIONER

## 2024-09-09 PROCEDURE — 1159F MED LIST DOCD IN RCRD: CPT | Mod: CPTII,S$GLB,, | Performed by: CLINICAL NURSE SPECIALIST

## 2024-09-09 PROCEDURE — 99999 PR PBB SHADOW E&M-EST. PATIENT-LVL IV: CPT | Mod: PBBFAC,,, | Performed by: NURSE PRACTITIONER

## 2024-09-09 PROCEDURE — 3008F BODY MASS INDEX DOCD: CPT | Mod: CPTII,S$GLB,, | Performed by: NURSE PRACTITIONER

## 2024-09-09 PROCEDURE — 1157F ADVNC CARE PLAN IN RCRD: CPT | Mod: CPTII,S$GLB,, | Performed by: NURSE PRACTITIONER

## 2024-09-09 PROCEDURE — 99205 OFFICE O/P NEW HI 60 MIN: CPT | Mod: S$GLB,,, | Performed by: NURSE PRACTITIONER

## 2024-09-09 PROCEDURE — 1126F AMNT PAIN NOTED NONE PRSNT: CPT | Mod: CPTII,S$GLB,, | Performed by: NURSE PRACTITIONER

## 2024-09-09 PROCEDURE — 1160F RVW MEDS BY RX/DR IN RCRD: CPT | Mod: CPTII,S$GLB,, | Performed by: NURSE PRACTITIONER

## 2024-09-09 PROCEDURE — 87086 URINE CULTURE/COLONY COUNT: CPT

## 2024-09-09 RX ORDER — CARVEDILOL 12.5 MG/1
TABLET ORAL
COMMUNITY
Start: 2024-08-02

## 2024-09-09 RX ORDER — ACETAMINOPHEN 500 MG
1000 TABLET ORAL
Status: CANCELLED | OUTPATIENT
Start: 2024-09-09

## 2024-09-09 RX ORDER — PREGABALIN 75 MG/1
75 CAPSULE ORAL
Status: CANCELLED | OUTPATIENT
Start: 2024-09-09 | End: 2024-09-09

## 2024-09-09 RX ORDER — FUROSEMIDE 40 MG/1
TABLET ORAL DAILY PRN
COMMUNITY
Start: 2023-03-02

## 2024-09-09 RX ORDER — KETOROLAC TROMETHAMINE 15 MG/ML
15 INJECTION, SOLUTION INTRAMUSCULAR; INTRAVENOUS
Status: CANCELLED | OUTPATIENT
Start: 2024-09-09 | End: 2024-09-12

## 2024-09-09 RX ORDER — CLINDAMYCIN PHOSPHATE 900 MG/50ML
900 INJECTION, SOLUTION INTRAVENOUS
Status: CANCELLED | OUTPATIENT
Start: 2024-09-09

## 2024-09-09 RX ORDER — GENTAMICIN SULFATE 100 MG/100ML
240 INJECTION, SOLUTION INTRAVENOUS
Status: CANCELLED | OUTPATIENT
Start: 2024-09-09

## 2024-09-09 RX ORDER — SODIUM CHLORIDE 9 MG/ML
INJECTION, SOLUTION INTRAVENOUS CONTINUOUS
Status: CANCELLED | OUTPATIENT
Start: 2024-09-09

## 2024-09-09 NOTE — ASSESSMENT & PLAN NOTE
EXAMINATION:  CT CHEST ABDOMEN PELVIS WITHOUT CONTRAST(XPD)  Lungs: Prominent emphysematous findings. Particular changes present within the anterior inferior right lower lobe with accompanying small irregular nodules. Partially calcified irregular opacity within the anteromedial left upper lobe. Multiple small nodules noted within the lingula, largest 4 mm    Follow up with PCP

## 2024-09-09 NOTE — PROGRESS NOTES
Urology ([facility]) H&P for upcoming procedure    CC: bladder cancer    HPI:Rhett Johnson is a 73 y.o. male  With [id number] urothelial cell carcinoma of the bladder.      The patient initially presented with prostate cancer and urinary incontinence s/p radical retropubic prostatectomy in 2001. In 07/2024 he presented to the ED with gross hematuria. Subsequent work up revealed asymmetric bladder wall thickening and urachal remnant. Underwent TURBT with bilateral retrograde pyelogram and left ureteral stent placement on 7/30/24. Path returned as HGMIBC.     Upper tract imaging (modality bilateral retrograde pyelogram, 07/30/24) - There was left hydroureteronephrosis down to the level of the bladder.  This was grade 3 hydronephrosis.  The distal ureter was evaluated with the semi rigid 6.8 [place] self-dilating Olympus ureteroscope.  There was no evidence of tumor involvement of the ureteral mucosa.     TURBT (07/30/24) - Urinary bladder, transurethral resection of bladder tumor (TURBT):   - High-grade papillary urothelial carcinoma   - Muscularis propria is present and is involved by carcinoma   - Lymphovascular and perineural invasion are identified   - See synoptic report below   - Mismatch repair protein testing is pending and results will be reported in a supplemental report       CT C/A/P-8/6/2024- no nodes or metastasis.  Bone scan-8/14/2024- no osseous metastasis.  Creatinine-1.9 (eGFR 37), albumin 2.7.    Due to renal function not felt to be a candidate for platinum based chemotherapy.    ROS:  No weight loss, fevers, chills  No back pain, bone pain  No bleeding disorders  No SOB, No CP  Otherwise neg except per HPI    Past Medical History:   Diagnosis Date    Cancer     Carotid occlusion, left     100% blockage    COPD (chronic obstructive pulmonary disease)     DVT (deep venous thrombosis)     Hx of hepatitis     Hypertension     on medication monitoring    Prostate cancer 2001    Dr. Bai        Past  Surgical History:   Procedure Laterality Date    ADENOIDECTOMY      APPENDECTOMY      Arthroscopic left knee Left     BARBOTAGE OF BLADDER N/A 7/30/2024    Procedure: BARBOTAGE, BLADDER;  Surgeon: Fabian Bai MD;  Location: [facility] OR;  Service: Urology;  Laterality: N/A;    BARBOTAGE OF URETER Bilateral 7/30/2024    Procedure: BARBOTAGE, URETER;  Surgeon: Fabian Bai MD;  Location: [facility] OR;  Service: Urology;  Laterality: Bilateral;    CARDIAC CATHETERIZATION      no stents    COLONOSCOPY      CYSTOSCOPY W/ RETROGRADES Bilateral 7/30/2024    Procedure: CYSTOSCOPY, WITH RETROGRADE PYELOGRAM;  Surgeon: Fabian Bai MD;  Location: [facility] OR;  Service: Urology;  Laterality: Bilateral;    CYSTOSCOPY W/ URETERAL STENT PLACEMENT Left 7/30/2024    Procedure: CYSTOSCOPY, WITH URETERAL STENT INSERTION;  Surgeon: Fabian Bai MD;  Location: [facility] OR;  Service: Urology;  Laterality: Left;    DILATION OF URETER Left 7/30/2024    Procedure: DILATION, URETER;  Surgeon: Fabian Bai MD;  Location: [facility] OR;  Service: Urology;  Laterality: Left;    DILATION OF URETHRA N/A 7/30/2024    Procedure: DILATION, URETHRA;  Surgeon: Fabian Bai MD;  Location: [facility] OR;  Service: Urology;  Laterality: N/A;    INSERTION OF MULTIFOCAL INTRAOCULAR LENS Left 12/12/2018    Procedure: INSERTION, IOL, MULTIFOCAL;  Surgeon: Eleanor Seaman MD;  Location: [facility] OR;  Service: Ophthalmology;  Laterality: Left;    INSERTION OF MULTIFOCAL INTRAOCULAR LENS Right 4/3/2019    Procedure: INSERTION, IOL, MULTIFOCAL;  Surgeon: Eleanor Seaman MD;  Location: [facility] OR;  Service: Ophthalmology;  Laterality: Right;    NECK SURGERY  2002    PHACOEMULSIFICATION OF CATARACT Left 12/12/2018    Procedure: PHACOEMULSIFICATION, CATARACT;  Surgeon: Eleanor Seaman MD;  Location: [facility] OR;  Service: Ophthalmology;  Laterality: Left;    PHACOEMULSIFICATION OF CATARACT Right 4/3/2019    Procedure: PHACOEMULSIFICATION, CATARACT;  Surgeon: Eleanor Seaman MD;   Location: [facility] OR;  Service: Ophthalmology;  Laterality: Right;    PROSTATE SURGERY  2001    Radical prostectemy     SPINE SURGERY  2002    Fusion of c4,c5,c6    TONSILLECTOMY      TURBT (TRANSURETHRAL RESECTION OF BLADDER TUMOR) N/A 7/30/2024    Procedure: TURBT (TRANSURETHRAL RESECTION OF BLADDER TUMOR);  Surgeon: Fabian Bai MD;  Location: [facility] OR;  Service: Urology;  Laterality: N/A;    URETEROSCOPY Bilateral 7/30/2024    Procedure: URETEROSCOPY;  Surgeon: Fabian Bai MD;  Location: [facility] OR;  Service: Urology;  Laterality: Bilateral;       Soc Hx:  Tob: Yes - 51 pack years  Illicit drug use:No  Occupation: Retired  : Yes, [employer] Hx:   Malignancies:  Yes prostate cancer   Kidney stones:  No    Review of patient's allergies indicates:   Allergen Reactions    Pcn [penicillins] Anaphylaxis    Valium [diazepam] Anaphylaxis       Current Outpatient Medications on File Prior to Visit   Medication Sig Dispense Refill    albuterol 90 mcg/actuation inhaler Inhale 2 puffs into the lungs every 6 (six) hours as needed for Wheezing. Rescue 18 g 11    ascorbic acid, vitamin C, (VITAMIN C) 100 MG tablet Take 100 mg by mouth.      aspirin (ECOTRIN) 81 MG EC tablet Take 81 mg by mouth once daily.      cholecalciferol, vitamin D3, 10 mcg (400 unit) Cap capsule Take 1 tablet by mouth.      cyclobenzaprine (FLEXERIL) 10 MG tablet Take 10 mg by mouth 3 (three) times daily.      gabapentin (NEURONTIN) 300 MG capsule Take 300 mg by mouth 3 (three) times daily.      multivitamin (THERAGRAN) per tablet Take 1 tablet by mouth once daily.      rosuvastatin (CRESTOR) 40 MG Tab Take 40 mg by mouth.      umeclidinium (INCRUSE ELLIPTA) 62.5 mcg/actuation DsDv Inhale 1 Inhaler into the lungs once daily. Controller 30 each 11    zinc gluconate 50 mg tablet Take 50 mg by mouth.       No current facility-administered medications on file prior to visit.     Anticoagulation:  Yes ASA 81 mg.    Physical Exam:  weight 164  lb/75 kg  BMI 23.6    AAOx4, NAD, WDWN  NC/AT, EOMI, PER, sclerae anicteric, speech normal, tongue midline  Nl effort, CTAB  RRR  Soft, non-tender, non-distended,   Scars: Midline infraumbilical incision from prior prostatectomy, RLQ incision from prior appendectomy.       Labs:   Urine dipstick today shows positive for RBC's and positive for leukocytes    Lab Results   Component Value Date     08/22/2024    K 3.9 08/22/2024     08/22/2024    CO2 24 08/22/2024    BUN 15 08/22/2024    CREATININE 1.8 (H) 08/22/2024     08/22/2024    CALCIUM 9.5 08/22/2024     Lab Results   Component Value Date    WBC 10.26 08/06/2024    HGB 9.4 (L) 08/06/2024    HCT 30.2 (L) 08/06/2024     08/06/2024         TURBT (07/30/24) - Urinary bladder, transurethral resection of bladder tumor (TURBT):   - High-grade papillary urothelial carcinoma   - Muscularis propria is present and is involved by carcinoma   - Lymphovascular and perineural invasion are identified   - See synoptic report below   - Mismatch repair protein testing is pending and results will be reported in a supplemental report    Urine cytology  07/30/24 atypical urothelial cells    Imaging:  CT CHEST ABDOMEN PELVIS WITHOUT CONTRAST(XPD)     CLINICAL HISTORY:  Bladder cancer, invasive, assess treatment response;Muscle Invasive Bladder Cancer; Malignant neoplasm of overlapping sites of bladder     TECHNIQUE:  Noncontrast imaging of the chest, abdomen pelvis performed.     COMPARISON:  06/30/2024 and 05/22/2024     FINDINGS:  Chest:     Heart and great vessels: Atherosclerotic calcification present including prominent LAD coronary calcification.  No pericardial effusion or significant cardiac chamber enlargement.     Adenopathy: No pathologically enlarged axillary, mediastinal or hilar lymph nodes.     Lungs: Prominent emphysematous findings.  Particular changes present within the anterior inferior right lower lobe with accompanying small irregular  nodules.  Partially calcified irregular opacity within the anteromedial left upper lobe.  Multiple small nodules noted within the lingula, largest 4 mm.     Pleura: Small layering right pleural effusion.     Miscellaneous: There is circumferential thickening of the distal esophagus.  Minimal bilateral gynecomastia.     Abdomen:     Liver: Unremarkable.     Gallbladder and biliary: Unremarkable.     Spleen: Unremarkable.     Pancreas: Unremarkable.     Adrenals: Unremarkable.     Kidneys: Right kidney unchanged.  Similar bilateral perinephric stranding/scarring noted.  Interval placement of left ureteral stent with proximal catheter coil noted within extrarenal pelvis.  No significant hydronephrosis.  Distal catheter terminates within the bladder.  There is persistent thickening of the posterior left lateral bladder which appears more prominent but likely accentuated by incomplete distension.  Small amount of air within the anterior bladder with air noted outside the bladder in a linear orientation felt to be within the previously noted urachal remanent.     Stomach/Bowel: Stomach demonstrates tiny hiatal hernia.  Small bowel nonobstructive.  No acute colonic abnormality.     Peritoneum: No ascites or pneumoperitoneum.     Abdominal Adenopathy: None.     Vasculature: Atherosclerotic plaquing present.     Pelvis:     Urinary bladder: See kidneys.     Pelvis adenopathy: None.     Bones: No acute findings.     Miscellaneous: None.     Impression:     1. Persistent posterior left lateral bladder wall thickening possibly slightly more prominent but likely related to bladder nondistention.  Left ureteral stent now present.  There is a small amount of air within the anterior bladder with tear felt to extend outside the bladder within the previously noted urachal remanent.  Air felt to be related to recent instrumentation.  Correlate clinically.  2. Extensive emphysematous findings with bilateral scarring.  Multiple small  lingular pulmonary nodules present which may be stable and comparison with outside old chest CTs recommended.  Small irregular nodules within the inferior right upper lobe may represent nodular scarring.  Again comparison with older studies likely beneficial.  Attention on follow-up exams recommended to exclude malignancy/metastatic disease.  Partially calcified irregular opacity within the medial left upper lobe may be sequela of prior infectious or inflammatory etiology.  3. Small layering right pleural effusion.  4. Circumferential thickening of distal esophagus which may relate to esophagitis.  Correlate with EGD.  5. Diffuse atherosclerotic plaquing including prominent LAD coronary calcification.    EXAMINATION:  NM BONE SCAN WHOLE BODY     CLINICAL HISTORY:  Metastatic disease evaluation;  Malignant neoplasm of overlapping sites of bladder     TECHNIQUE:  Approximately 2-3 hours following the IV administration of 22.1 mCi of Tc-99m-MDP, anterior and posterior delayed whole body images were acquired.  Additional spot images acquired.     COMPARISON:  CT 08/06/2024.     FINDINGS:  There is physiologic distribution of the radiopharmaceutical throughout the skeleton.     Few foci of mild uptake in the right mid ribs anteriorly.  No corresponding abnormality on recent CT.     Scattered uptake throughout lumbar spine, most prominent superiorly noting hardware and degenerative change on recent CT.     There is normal uptake in the genitourinary system and soft tissues.     Impression:     No convincing scintigraphic evidence of osteoblastic metastatic disease.     Few foci of mild uptake in right mid ribs anteriorly.  Nonspecific, possibly traumatic.  No corresponding abnormality on recent CT.  Recommend attention on follow-up.     Increased tracer uptake in the lumbar spine, likely related to degenerative uptake and fusion hardware.      Assessment: 73 y.o.  yo M with HGMIUC (high grade muscle invasive urothelial  carcinoma) of the bladder. [id number] urothelial cell carcinoma of the bladder    Plan:  1. To OR on 09/16/24 for radical cystectomy with ileal conduit  2. Consents signed   3. I have explained the risk, benefits, and alternatives of the procedure in detail. The patient voices understanding and all questions have been answered. The patient agrees to proceed as planned.   4. Follow up medical clearance appointment, labs and EKG with Dr. Robert on 9/9/24.  5. Will send urine culture. Will follow up results.   6. Saw Cardiology on 9/3 in [place], underwent Cardiac Stress test and was deemed low risk.     Michael Mayo, DO

## 2024-09-09 NOTE — ASSESSMENT & PLAN NOTE
BUN 15  CR 1.8 GFR 39.5    Stages of CKD discussed  Deleterious effects NSAID's , Beneficial effects of Hydration discussed   Tylenol as needed for pain   I suggest monitoring renal function, in put and out put status nguyễn-operatively. I  suggest avoiding nephrotoxic medication including NSAIDs, COX2 inhibitors, intravenous contrast agent,avoiding hypotension to prevent further renal impairment.

## 2024-09-09 NOTE — HPI
This is a 73 y.o. male  who presents today for a preoperative evaluation in preparation for creation ileal conduit, cystectomy  Surgery is indicated for cancer of overlapping sites of bladder.   Patient is new to me.    The history has been obtained by speaking with the patient and reviewing the electronic medical record and/or outside health information. Significant health conditions for the perioperative period are discussed below in assessment and plan.     Patient reports current health status to be Good.  Denies any new symptoms before surgery.

## 2024-09-09 NOTE — ASSESSMENT & PLAN NOTE
Patient with left ICA occlusion ( no history of CVA, only mild white matter changes)  Requested records from [facility]  Being followed by Dr. Ching  Additional records in media

## 2024-09-09 NOTE — OUTPATIENT SUBJECTIVE & OBJECTIVE
Outpatient Subjective & Objective      Chief Complaint: Preoperative evaulation, perioperative medical management, and complication reduction plan.     Functional Capacity:  Able to climb a flight of stairs without CP SOB or Syncope.  Able to meet 4 METs      Anesthesia issues: None    Difficulty mouth opening: none    Steroid use in the last 12 months: steroid to treat blood in urine    Dental Issues: none    Family anesthesia difficulty: None     Family Hx of Thrombosis none    Past Medical History:   Diagnosis Date    Anemia 9/9/2024    Asthma     Cancer     Carotid occlusion, left     100% blockage    COPD (chronic obstructive pulmonary disease)     Current tobacco use 08/20/2018    Deep vein thrombosis     Disorder of kidney and ureter     DVT (deep venous thrombosis)     Gross hematuria 07/30/2024    Hx of hepatitis     Hyperlipidemia     Hypertension     on medication monitoring    Peripheral artery disease     Prostate cancer 2001    Dr. Bai      Past Surgical History:   Procedure Laterality Date    ADENOIDECTOMY      APPENDECTOMY      Arthroscopic left knee Left     BARBOTAGE OF BLADDER N/A 7/30/2024    Procedure: BARBOTAGE, BLADDER;  Surgeon: Fabian Bai MD;  Location: [facility] OR;  Service: Urology;  Laterality: N/A;    BARBOTAGE OF URETER Bilateral 7/30/2024    Procedure: BARBOTAGE, URETER;  Surgeon: Fabian Bai MD;  Location: [facility] OR;  Service: Urology;  Laterality: Bilateral;    CARDIAC CATHETERIZATION      no stents    COLONOSCOPY      CYSTOSCOPY W/ RETROGRADES Bilateral 7/30/2024    Procedure: CYSTOSCOPY, WITH RETROGRADE PYELOGRAM;  Surgeon: Fabian Bai MD;  Location: [facility] OR;  Service: Urology;  Laterality: Bilateral;    CYSTOSCOPY W/ URETERAL STENT PLACEMENT Left 7/30/2024    Procedure: CYSTOSCOPY, WITH URETERAL STENT INSERTION;  Surgeon: Fabian Bai MD;  Location: [facility] OR;  Service: Urology;  Laterality: Left;    DILATION OF URETER Left 7/30/2024    Procedure: DILATION,  URETER;  Surgeon: Fabian Bai MD;  Location: [facility] OR;  Service: Urology;  Laterality: Left;    DILATION OF URETHRA N/A 7/30/2024    Procedure: DILATION, URETHRA;  Surgeon: Fabian Bai MD;  Location: [facility] OR;  Service: Urology;  Laterality: N/A;    INSERTION OF MULTIFOCAL INTRAOCULAR LENS Left 12/12/2018    Procedure: INSERTION, IOL, MULTIFOCAL;  Surgeon: Eleanor Seaman MD;  Location: [facility] OR;  Service: Ophthalmology;  Laterality: Left;    INSERTION OF MULTIFOCAL INTRAOCULAR LENS Right 4/3/2019    Procedure: INSERTION, IOL, MULTIFOCAL;  Surgeon: Eleanor Seaman MD;  Location: [facility] OR;  Service: Ophthalmology;  Laterality: Right;    NECK SURGERY  2002    PHACOEMULSIFICATION OF CATARACT Left 12/12/2018    Procedure: PHACOEMULSIFICATION, CATARACT;  Surgeon: Eleanor Seaman MD;  Location: [facility] OR;  Service: Ophthalmology;  Laterality: Left;    PHACOEMULSIFICATION OF CATARACT Right 4/3/2019    Procedure: PHACOEMULSIFICATION, CATARACT;  Surgeon: Eleanor Seaman MD;  Location: [facility] OR;  Service: Ophthalmology;  Laterality: Right;    PROSTATE SURGERY  2001    Radical prostectemy     SPINE SURGERY  2002    Fusion of c4,c5,c6    TONSILLECTOMY      TURBT (TRANSURETHRAL RESECTION OF BLADDER TUMOR) N/A 7/30/2024    Procedure: TURBT (TRANSURETHRAL RESECTION OF BLADDER TUMOR);  Surgeon: Fabian Bai MD;  Location: [facility];  Service: Urology;  Laterality: N/A;    URETEROSCOPY Bilateral 7/30/2024    Procedure: URETEROSCOPY;  Surgeon: Fabian Bai MD;  Location: [facility];  Service: Urology;  Laterality: Bilateral;       Review of Systems   Constitutional:  Negative for chills, fatigue, fever and unexpected weight change.   HENT:  Negative for trouble swallowing and voice change.    Eyes:  Negative for photophobia and visual disturbance.        No acute visual changes   Respiratory:  Negative for cough, shortness of breath and wheezing.         STOP bang  Score  Low risk LUZ  High risk LUZ   Cardiovascular:  Negative for  "chest pain, palpitations and leg swelling.   Gastrointestinal:  Negative for abdominal pain, blood in stool, constipation, diarrhea, nausea and vomiting.        No FLD, Hepatitis, Cirrhosis  No BRB or black tarry stool   Genitourinary:  Negative for difficulty urinating, dysuria, frequency, hematuria and urgency.        Nocturia   Musculoskeletal:  Negative for arthralgias, gait problem, myalgias, neck pain and neck stiffness.   Neurological:  Negative for dizziness, seizures, syncope, light-headedness, numbness and headaches.   Psychiatric/Behavioral:  Negative for agitation, behavioral problems, confusion, decreased concentration, dysphoric mood, hallucinations, self-injury, sleep disturbance and suicidal ideas. The patient is not nervous/anxious and is not hyperactive.         VITALS  Visit Vitals  BP (!) 175/92   Temp 97.6 °F (36.4 °C) (Oral)   Ht 5' 10" (1.778 m)   Wt 77 kg (169 lb 12.1 oz)   SpO2 99%   BMI 24.36 kg/m²          Physical Exam  Constitutional:       General: He is not in acute distress.     Appearance: He is well-developed. He is not diaphoretic.   HENT:      Head: Normocephalic.      Right Ear: Hearing normal.      Left Ear: Hearing normal.      Nose: Nose normal.      Mouth/Throat:      Lips: Pink.      Mouth: Mucous membranes are moist.   Eyes:      General: Lids are normal.      Conjunctiva/sclera: Conjunctivae normal.      Pupils: Pupils are equal, round, and reactive to light.   Neck:      Vascular: No carotid bruit.      Trachea: Trachea and phonation normal.   Cardiovascular:      Rate and Rhythm: Normal rate and regular rhythm.      Pulses:           Carotid pulses are 2+ on the right side and 2+ on the left side.       Radial pulses are 2+ on the right side and 2+ on the left side.        Posterior tibial pulses are 2+ on the right side and 2+ on the left side.      Heart sounds: Normal heart sounds. No murmur heard.     No friction rub. No gallop.   Pulmonary:      Effort: Pulmonary " effort is normal.      Breath sounds: Normal breath sounds.      Comments: Clear and equal  Anterior and Posterior BBS  Abdominal:      General: Abdomen is protuberant. Bowel sounds are normal. There is no distension.      Palpations: Abdomen is soft.      Tenderness: There is no abdominal tenderness.   Musculoskeletal:         General: No tenderness or deformity. Normal range of motion.      Cervical back: Normal range of motion.      Right lower leg: No edema.      Left lower leg: No edema.   Lymphadenopathy:      Head:      Right side of head: No submental, submandibular, tonsillar, preauricular, posterior auricular or occipital adenopathy.      Left side of head: No submental, submandibular, tonsillar, preauricular, posterior auricular or occipital adenopathy.      Cervical:      Right cervical: No superficial cervical adenopathy.     Left cervical: No superficial cervical adenopathy.   Skin:     General: Skin is warm and dry.      Capillary Refill: Capillary refill takes 2 to 3 seconds.      Coloration: Skin is not pale.      Findings: No erythema or rash.   Neurological:      Mental Status: He is alert and oriented to person, place, and time.      GCS: GCS eye subscore is 4. GCS verbal subscore is 5. GCS motor subscore is 6.      Motor: No abnormal muscle tone.      Coordination: Coordination normal.   Psychiatric:         Attention and Perception: Attention and perception normal.         Mood and Affect: Mood and affect normal.         Speech: Speech normal.         Behavior: Behavior normal. Behavior is cooperative.         Thought Content: Thought content normal.         Cognition and Memory: Cognition and memory normal.         Judgment: Judgment normal.          Significant Labs:  Lab Results   Component Value Date    WBC 9.34 09/13/2024    HGB 11.3 (L) 09/13/2024    HCT 36.5 (L) 09/13/2024     09/13/2024    CHOL 201 (H) 02/16/2017    TRIG 54 02/16/2017    HDL 82 (H) 02/16/2017    ALT 17 08/22/2024     AST 19 08/22/2024     08/22/2024    K 3.9 08/22/2024     08/22/2024    CREATININE 1.8 (H) 08/22/2024    BUN 15 08/22/2024    CO2 24 08/22/2024    TSH 1.282 06/12/2024    INR 1.0 05/22/2024       Diagnostic Studies: No relevant studies.    EKG:   Results for orders placed or performed in visit on 07/29/24   EKG 12-lead    Collection Time: 07/29/24  6:30 AM   Result Value Ref Range    QRS Duration 114 ms    [facility] QTC Calculation 460 ms    Narrative    Test Reason : D49.4,N13.5,N13.30,    Vent. Rate : 070 BPM     Atrial Rate : 070 BPM     P-R Int : 134 ms          QRS Dur : 114 ms      QT Int : 426 ms       P-R-T Axes : 063 065 053 degrees     QTc Int : 460 ms    Normal sinus rhythm  Incomplete right bundle branch block  Nonspecific ST and/or T wave abnormalities  Abnormal ECG  When compared with ECG of 13-MAR-2023 09:10,  Vent. rate has decreased BY  45 BPM  Septal infarct is now Present  Confirmed by Edmond Manning MD, Armen ([id number]) on 7/29/2024 10:22:18 PM    Referred By: NIKO BRUNNER           Confirmed By:Armen Manning,       2D ECHO:  TTE:  No results found for this or any previous visit.    SANDRA:  No results found for this or any previous visit.     Imaging     Active Cardiac Conditions: None      Revised Cardiac Risk Index   High -Risk Surgery  Intraperitoneal; Intrathoracic; suprainguinal vascular Yes- + 1 No- 0   History of Ischemic Heart Disease   (Hx of MI/positive exercise test/current chest pain due to ischemia/use of nitrate therapy/EKG with pathological Q waves) Yes- + 1 No- 0   History of CHF  (Pulmonary edema/bilateral rales or S3 gallop/PND/CXR showing pulmonary vascular redistribution) Yes- + 1 No- 0   History of CVA   (Prior stroke or TIA) Yes- + 1 No- 0   Pre-operative treatment with insulin Yes- + 1 No- 0   Pre-operative creatinine > 2mg/dl Yes- + 1 No- 0   Total:      Risk Status:  Estimated risk of cardiac complications after non-cardiac surgery using the Revised Cardiac Risk  Index for Preoperative risk is 6%      ARISCAT (Canet) risk index: Intermediate: 13.3% risk of post-op pulmonary complications.    American Society of Anesthesiologists Physical Status classification (ASA): 3          No further cardiac workup needed prior to surgery.    Outpatient Subjective & Objective

## 2024-09-09 NOTE — PROGRESS NOTES
Pre op Ostomy marking:    This patient was seen today per request   in preparation for upcoming ostomy surgery on 9/16/24  Explained procedure for stoma siting and rationale. Simulated appliance use with empty pouch provided. Abdomen examined with patient sitting, standing and lying down. Observed abdomen, contours, location of belt line, with in visual field and rectus muscle palpation considered.  Stoma marking/siting was performed per protocol and patient marked with a permanent marker and skin staining with silver nitrate. Explained that final decision for stomal placement is up to surgeon's discretion.     The proposed surgery was discussed and patient educated on expected postoperative course and expectations. The patient was allowed to ask questions and was also given pre-op information kit from  the [employer] for review at home prior to surgery.

## 2024-09-09 NOTE — ASSESSMENT & PLAN NOTE
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 Gannon-   Dr. Hendrix

## 2024-09-09 NOTE — PROGRESS NOTES
[facility]  Progress Note    Patient Name: Rhett Johnson  MRN: [id number]  Date of Evaluation- 09/13/2024  PCP- Sue Campos MD    Future cases for Rhett Johnson [[id number]]       Case ID Status Date Time Candido Procedure Provider Location    [id number] [facility] 9/16/2024 11:15  CREATION, ILEAL CONDUIT Hari Salguero MD [[id number]] [facility] OR 2ND FLR            HPI:  This is a 73 y.o. male  who presents today for a preoperative evaluation in preparation for creation ileal conduit, cystectomy  Surgery is indicated for cancer of overlapping sites of bladder.   Patient is new to me.    The history has been obtained by speaking with the patient and reviewing the electronic medical record and/or outside health information. Significant health conditions for the perioperative period are discussed below in assessment and plan.     Patient reports current health status to be Good.  Denies any new symptoms before surgery.       Subjective/ Objective:     Chief Complaint: Preoperative evaulation, perioperative medical management, and complication reduction plan.     Functional Capacity:  Able to climb a flight of stairs without CP SOB or Syncope.  Able to meet 4 METs      Anesthesia issues: None    Difficulty mouth opening: none    Steroid use in the last 12 months: steroid to treat blood in urine    Dental Issues: none    Family anesthesia difficulty: None     Family Hx of Thrombosis none    Past Medical History:   Diagnosis Date    Anemia 9/9/2024    Asthma     Cancer     Carotid occlusion, left     100% blockage    COPD (chronic obstructive pulmonary disease)     Current tobacco use 08/20/2018    Deep vein thrombosis     Disorder of kidney and ureter     DVT (deep venous thrombosis)     Gross hematuria 07/30/2024    Hx of hepatitis     Hyperlipidemia     Hypertension     on medication monitoring    Peripheral artery disease     Prostate cancer 2001    Dr. Bai      Past Surgical History:    Procedure Laterality Date    ADENOIDECTOMY      APPENDECTOMY      Arthroscopic left knee Left     BARBOTAGE OF BLADDER N/A 7/30/2024    Procedure: BARBOTAGE, BLADDER;  Surgeon: Fabian Bai MD;  Location: [facility] OR;  Service: Urology;  Laterality: N/A;    BARBOTAGE OF URETER Bilateral 7/30/2024    Procedure: BARBOTAGE, URETER;  Surgeon: Fabian Bai MD;  Location: [facility] OR;  Service: Urology;  Laterality: Bilateral;    CARDIAC CATHETERIZATION      no stents    COLONOSCOPY      CYSTOSCOPY W/ RETROGRADES Bilateral 7/30/2024    Procedure: CYSTOSCOPY, WITH RETROGRADE PYELOGRAM;  Surgeon: Fabian Bai MD;  Location: [facility] OR;  Service: Urology;  Laterality: Bilateral;    CYSTOSCOPY W/ URETERAL STENT PLACEMENT Left 7/30/2024    Procedure: CYSTOSCOPY, WITH URETERAL STENT INSERTION;  Surgeon: Fabian Bai MD;  Location: [facility] OR;  Service: Urology;  Laterality: Left;    DILATION OF URETER Left 7/30/2024    Procedure: DILATION, URETER;  Surgeon: Fabian Bai MD;  Location: [facility] OR;  Service: Urology;  Laterality: Left;    DILATION OF URETHRA N/A 7/30/2024    Procedure: DILATION, URETHRA;  Surgeon: Fabian Bai MD;  Location: [facility] OR;  Service: Urology;  Laterality: N/A;    INSERTION OF MULTIFOCAL INTRAOCULAR LENS Left 12/12/2018    Procedure: INSERTION, IOL, MULTIFOCAL;  Surgeon: Eleanor Seaman MD;  Location: [facility] OR;  Service: Ophthalmology;  Laterality: Left;    INSERTION OF MULTIFOCAL INTRAOCULAR LENS Right 4/3/2019    Procedure: INSERTION, IOL, MULTIFOCAL;  Surgeon: Eleanor Seaman MD;  Location: [facility] OR;  Service: Ophthalmology;  Laterality: Right;    NECK SURGERY  2002    PHACOEMULSIFICATION OF CATARACT Left 12/12/2018    Procedure: PHACOEMULSIFICATION, CATARACT;  Surgeon: Eleanor Seaman MD;  Location: [facility] OR;  Service: Ophthalmology;  Laterality: Left;    PHACOEMULSIFICATION OF CATARACT Right 4/3/2019    Procedure: PHACOEMULSIFICATION, CATARACT;  Surgeon: Eleanor Seaman MD;  Location: [facility] OR;   "Service: Ophthalmology;  Laterality: Right;    PROSTATE SURGERY  2001    Radical prostectemy     SPINE SURGERY  2002    Fusion of c4,c5,c6    TONSILLECTOMY      TURBT (TRANSURETHRAL RESECTION OF BLADDER TUMOR) N/A 7/30/2024    Procedure: TURBT (TRANSURETHRAL RESECTION OF BLADDER TUMOR);  Surgeon: Fabian Bai MD;  Location: [facility] OR;  Service: Urology;  Laterality: N/A;    URETEROSCOPY Bilateral 7/30/2024    Procedure: URETEROSCOPY;  Surgeon: Fabian Bai MD;  Location: [facility] OR;  Service: Urology;  Laterality: Bilateral;       Review of Systems   Constitutional:  Negative for chills, fatigue, fever and unexpected weight change.   HENT:  Negative for trouble swallowing and voice change.    Eyes:  Negative for photophobia and visual disturbance.        No acute visual changes   Respiratory:  Negative for cough, shortness of breath and wheezing.         STOP bang  Score  Low risk LUZ  High risk LUZ   Cardiovascular:  Negative for chest pain, palpitations and leg swelling.   Gastrointestinal:  Negative for abdominal pain, blood in stool, constipation, diarrhea, nausea and vomiting.        No FLD, Hepatitis, Cirrhosis  No BRB or black tarry stool   Genitourinary:  Negative for difficulty urinating, dysuria, frequency, hematuria and urgency.        Nocturia   Musculoskeletal:  Negative for arthralgias, gait problem, myalgias, neck pain and neck stiffness.   Neurological:  Negative for dizziness, seizures, syncope, light-headedness, numbness and headaches.   Psychiatric/Behavioral:  Negative for agitation, behavioral problems, confusion, decreased concentration, dysphoric mood, hallucinations, self-injury, sleep disturbance and suicidal ideas. The patient is not nervous/anxious and is not hyperactive.         VITALS  Visit Vitals  BP (!) 175/92   Temp 97.6 °F (36.4 °C) (Oral)   Ht 5' 10" (1.778 m)   Wt 77 kg (169 lb 12.1 oz)   SpO2 99%   BMI 24.36 kg/m²          Physical Exam  Constitutional:       General: He is " not in acute distress.     Appearance: He is well-developed. He is not diaphoretic.   HENT:      Head: Normocephalic.      Right Ear: Hearing normal.      Left Ear: Hearing normal.      Nose: Nose normal.      Mouth/Throat:      Lips: Pink.      Mouth: Mucous membranes are moist.   Eyes:      General: Lids are normal.      Conjunctiva/sclera: Conjunctivae normal.      Pupils: Pupils are equal, round, and reactive to light.   Neck:      Vascular: No carotid bruit.      Trachea: Trachea and phonation normal.   Cardiovascular:      Rate and Rhythm: Normal rate and regular rhythm.      Pulses:           Carotid pulses are 2+ on the right side and 2+ on the left side.       Radial pulses are 2+ on the right side and 2+ on the left side.        Posterior tibial pulses are 2+ on the right side and 2+ on the left side.      Heart sounds: Normal heart sounds. No murmur heard.     No friction rub. No gallop.   Pulmonary:      Effort: Pulmonary effort is normal.      Breath sounds: Normal breath sounds.      Comments: Clear and equal  Anterior and Posterior BBS  Abdominal:      General: Abdomen is protuberant. Bowel sounds are normal. There is no distension.      Palpations: Abdomen is soft.      Tenderness: There is no abdominal tenderness.   Musculoskeletal:         General: No tenderness or deformity. Normal range of motion.      Cervical back: Normal range of motion.      Right lower leg: No edema.      Left lower leg: No edema.   Lymphadenopathy:      Head:      Right side of head: No submental, submandibular, tonsillar, preauricular, posterior auricular or occipital adenopathy.      Left side of head: No submental, submandibular, tonsillar, preauricular, posterior auricular or occipital adenopathy.      Cervical:      Right cervical: No superficial cervical adenopathy.     Left cervical: No superficial cervical adenopathy.   Skin:     General: Skin is warm and dry.      Capillary Refill: Capillary refill takes 2 to 3  seconds.      Coloration: Skin is not pale.      Findings: No erythema or rash.   Neurological:      Mental Status: He is alert and oriented to person, place, and time.      GCS: GCS eye subscore is 4. GCS verbal subscore is 5. GCS motor subscore is 6.      Motor: No abnormal muscle tone.      Coordination: Coordination normal.   Psychiatric:         Attention and Perception: Attention and perception normal.         Mood and Affect: Mood and affect normal.         Speech: Speech normal.         Behavior: Behavior normal. Behavior is cooperative.         Thought Content: Thought content normal.         Cognition and Memory: Cognition and memory normal.         Judgment: Judgment normal.          Significant Labs:  Lab Results   Component Value Date    WBC 9.34 09/13/2024    HGB 11.3 (L) 09/13/2024    HCT 36.5 (L) 09/13/2024     09/13/2024    CHOL 201 (H) 02/16/2017    TRIG 54 02/16/2017    HDL 82 (H) 02/16/2017    ALT 17 08/22/2024    AST 19 08/22/2024     08/22/2024    K 3.9 08/22/2024     08/22/2024    CREATININE 1.8 (H) 08/22/2024    BUN 15 08/22/2024    CO2 24 08/22/2024    TSH 1.282 06/12/2024    INR 1.0 05/22/2024       Diagnostic Studies: No relevant studies.    EKG:   Results for orders placed or performed in visit on 07/29/24   EKG 12-lead    Collection Time: 07/29/24  6:30 AM   Result Value Ref Range    QRS Duration 114 ms    [facility]HS QTC Calculation 460 ms    Narrative    Test Reason : D49.4,N13.5,N13.30,    Vent. Rate : 070 BPM     Atrial Rate : 070 BPM     P-R Int : 134 ms          QRS Dur : 114 ms      QT Int : 426 ms       P-R-T Axes : 063 065 053 degrees     QTc Int : 460 ms    Normal sinus rhythm  Incomplete right bundle branch block  Nonspecific ST and/or T wave abnormalities  Abnormal ECG  When compared with ECG of 13-MAR-2023 09:10,  Vent. rate has decreased BY  45 BPM  Septal infarct is now Present  Confirmed by Edmond Manning MD, Jose ([id number]) on 7/29/2024 10:22:18 PM    Referred  By: NIKO BRUNNER           Confirmed By:Armen Manning,       2D ECHO:  TTE:  No results found for this or any previous visit.    SANDRA:  No results found for this or any previous visit.     Imaging     Active Cardiac Conditions: None      Revised Cardiac Risk Index   High -Risk Surgery  Intraperitoneal; Intrathoracic; suprainguinal vascular Yes- + 1 No- 0   History of Ischemic Heart Disease   (Hx of MI/positive exercise test/current chest pain due to ischemia/use of nitrate therapy/EKG with pathological Q waves) Yes- + 1 No- 0   History of CHF  (Pulmonary edema/bilateral rales or S3 gallop/PND/CXR showing pulmonary vascular redistribution) Yes- + 1 No- 0   History of CVA   (Prior stroke or TIA) Yes- + 1 No- 0   Pre-operative treatment with insulin Yes- + 1 No- 0   Pre-operative creatinine > 2mg/dl Yes- + 1 No- 0   Total:      Risk Status:  Estimated risk of cardiac complications after non-cardiac surgery using the Revised Cardiac Risk Index for Preoperative risk is 6%      ARISCAT (Canet) risk index: Intermediate: 13.3% risk of post-op pulmonary complications.    American Society of Anesthesiologists Physical Status classification (ASA): 3          No further cardiac workup needed prior to surgery.        Preoperative cardiac risk assessment-  The patient does not have any active cardiac conditions . Revised cardiac risk index predictors- ---.Functional capacity is more than 4 Mets. He will be undergoing a Urological procedure that carries a Moderate Risk risk     The estimated risk of the rate of adverse cardiac outcomes  6    No further cardiac work up is indicated prior to proceeding with the surgery     Orders Placed This Encounter    CBC Auto Differential    CBC Auto Differential       American Society of Anesthesiologists Physical status classification ( ASA ) class: 3     Postoperative pulmonary complication risk assessment: 13.3     ARISCAT ( Canet) risk index- risk class -  Low, if duration of surgery  is under 3 hours, intermediate, if duration of surgery is over 3 hours          Assessment/Plan:     Chronic obstructive pulmonary disease with acute exacerbation  Albuterol Inhaler- 1 time 3 months  Incruse Ellipta  Umeclidium- last used 3 months ago    States he has not been using his inhalers since he quit smoking 2020  Pulse ox 99% on room air        Essential hypertension  Current BP  213/92 today.    Repeat 5 minutes 197/86  Repeat 40 minutes 175/92  Patient asked to do home BP readings- Call Friday for follow up    9/9/24 176/80  9/10/24 154/76  9/12/24 162/59  9/13/24 150/65    Taking: Procardia, Metoprolol   Patient reports home BP readings of: 160/75 in am; 135-140/ 70s   I recommend monitoring BP during perioperative period as uncontrolled pain can elevate blood pressure.         Hyperlipidemia  Crestor    Atherosclerosis of aorta  Crestor and Asa    ETOH abuse  Drinks a 6 pack per week  Usually drinks bud Zero    Gastroesophageal reflux disease  No current meds and no current complaints of GERD  .    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 Gannon-   Dr. Hendrix    History of carotid artery disease   Patient with left ICA occlusion ( no history of CVA, only mild white matter changes)  Requested records from [facility]  Being followed by Dr. Ching  Additional records in media        Peripheral artery disease  H/O stents to LE x7  Patient of [facility]  Records requested          Chronic kidney disease, stage 3a  BUN 15  CR 1.8 GFR 39.5    Stages of CKD discussed  Deleterious effects NSAID's , Beneficial effects of Hydration discussed   Tylenol as needed for pain   I suggest monitoring renal function, in put and out put status nguyễn-operatively. I  suggest avoiding nephrotoxic medication including NSAIDs, COX2 inhibitors,  intravenous contrast agent,avoiding hypotension to prevent further renal impairment.     Daily urinary incontinence  D/t prostate surgery    Malignant neoplasm of prostate  Prostate surgery Sx 9/16/24    Neuropathy  Gabapentin    Pulmonary nodule, left  EXAMINATION:  CT CHEST ABDOMEN PELVIS WITHOUT CONTRAST(XPD)  Lungs: Prominent emphysematous findings. Particular changes present within the anterior inferior right lower lobe with accompanying small irregular nodules. Partially calcified irregular opacity within the anteromedial left upper lobe. Multiple small nodules noted within the lingula, largest 4 mm    Follow up with PCP    Atherosclerosis of both carotid arteries  Records from [facility] scanned to media    Anemia  Hgb 11 HCT 37.1    Elevated WBC count  Component      Latest Ref Rng 9/9/2024   WBC      3.90 - 12.70 K/uL 13.02 (H)       Legend:  (H) High    No overt signs or symptoms of infection  Repeat WBC next week  Instruct pt to call if he has any fever or s/s of infection      Hypoalbuminemia  Albumin 3.4    History of prostate cancer  Sx 9/16/24    H/O echocardiogram      H/O cardiovascular stress test                Preventive perioperative care    Thromboembolic prophylaxis:  His risk factors for thrombosis include surgical procedure, age, and reduced mobility.I suggest  thromboembolic prophylaxis ( mechanical/pharmacological, weighing the risk benefits of pharmacological agent use considering nguyễn procedural bleeding )  during the perioperative period.I suggested being active in the post operative period.      Postoperative pulmonary complication prophylaxis-Risk factors for post operative pulmonary complications include age over 65 years, surgery lasting over 3 hours, ASA class >2, and COPD- I suggest use of short acting beta agonist within 30 minutes of intubation, incentive spirometry use, early ambulation, and pain control so as to avoid diaphragmatic splinting  Brush teeth twice per day, oral rinses,  sleep with the head of the bed up 30 degrees     Renal complication prophylaxis-Risk factors for renal complications include pre-existing renal disease, age, and hypertension . I suggest keeping him well hydrated and avoidance/ minimizing the use of  NSAID's,FLETCHER 2 Inhibitors ,IV contrast if possible in the perioperative period.I suggested drinking 2 litre's of water a day      Surgical site Infection Prophylaxis-I  suggest appropriate antibiotic for Prophylaxis against Surgical site infections Shower with Hibiclens in the night before surgery and the morning of surgery     This visit was focused on Preoperative evaluation, Perioperative Medical management, complication reduction plans. I suggest that the patient follows up with primary care or relevant sub specialists for ongoing health care.    I appreciate the opportunity to be involved in this patients care. Please feel free to contact me if there were any questions about this consultation.    Patient is optimized      I spent a total of 84   minutes on the day of the visit.This includes face to face time and non-face to face time preparing to see the patient (eg, review of tests), obtaining and/or reviewing separately obtained history, documenting clinical information in the electronic or other health record, independently interpreting results and communicating results to the patient/family/caregiver, or care coordinator.      Reno Gay NP  Perioperative Medicine  [facility]   Pager [phone]

## 2024-09-09 NOTE — H&P (VIEW-ONLY)
Urology ([facility]) H&P for upcoming procedure    CC: bladder cancer    HPI:Rhett Johnson is a 73 y.o. male  With [id number] urothelial cell carcinoma of the bladder.      The patient initially presented with prostate cancer and urinary incontinence s/p radical retropubic prostatectomy in 2001. In 07/2024 he presented to the ED with gross hematuria. Subsequent work up revealed asymmetric bladder wall thickening and urachal remnant. Underwent TURBT with bilateral retrograde pyelogram and left ureteral stent placement on 7/30/24. Path returned as HGMIBC.     Upper tract imaging (modality bilateral retrograde pyelogram, 07/30/24) - There was left hydroureteronephrosis down to the level of the bladder.  This was grade 3 hydronephrosis.  The distal ureter was evaluated with the semi rigid 6.8 [place] self-dilating Olympus ureteroscope.  There was no evidence of tumor involvement of the ureteral mucosa.     TURBT (07/30/24) - Urinary bladder, transurethral resection of bladder tumor (TURBT):   - High-grade papillary urothelial carcinoma   - Muscularis propria is present and is involved by carcinoma   - Lymphovascular and perineural invasion are identified   - See synoptic report below   - Mismatch repair protein testing is pending and results will be reported in a supplemental report       CT C/A/P-8/6/2024- no nodes or metastasis.  Bone scan-8/14/2024- no osseous metastasis.  Creatinine-1.9 (eGFR 37), albumin 2.7.    Due to renal function not felt to be a candidate for platinum based chemotherapy.    ROS:  No weight loss, fevers, chills  No back pain, bone pain  No bleeding disorders  No SOB, No CP  Otherwise neg except per HPI    Past Medical History:   Diagnosis Date    Cancer     Carotid occlusion, left     100% blockage    COPD (chronic obstructive pulmonary disease)     DVT (deep venous thrombosis)     Hx of hepatitis     Hypertension     on medication monitoring    Prostate cancer 2001    Dr. Bai        Past  Surgical History:   Procedure Laterality Date    ADENOIDECTOMY      APPENDECTOMY      Arthroscopic left knee Left     BARBOTAGE OF BLADDER N/A 7/30/2024    Procedure: BARBOTAGE, BLADDER;  Surgeon: Fabian Bai MD;  Location: [facility] OR;  Service: Urology;  Laterality: N/A;    BARBOTAGE OF URETER Bilateral 7/30/2024    Procedure: BARBOTAGE, URETER;  Surgeon: Fabian Bai MD;  Location: [facility] OR;  Service: Urology;  Laterality: Bilateral;    CARDIAC CATHETERIZATION      no stents    COLONOSCOPY      CYSTOSCOPY W/ RETROGRADES Bilateral 7/30/2024    Procedure: CYSTOSCOPY, WITH RETROGRADE PYELOGRAM;  Surgeon: Fabian Bai MD;  Location: [facility] OR;  Service: Urology;  Laterality: Bilateral;    CYSTOSCOPY W/ URETERAL STENT PLACEMENT Left 7/30/2024    Procedure: CYSTOSCOPY, WITH URETERAL STENT INSERTION;  Surgeon: Fabian Bai MD;  Location: [facility] OR;  Service: Urology;  Laterality: Left;    DILATION OF URETER Left 7/30/2024    Procedure: DILATION, URETER;  Surgeon: Fabian Bai MD;  Location: [facility] OR;  Service: Urology;  Laterality: Left;    DILATION OF URETHRA N/A 7/30/2024    Procedure: DILATION, URETHRA;  Surgeon: Fabian Bai MD;  Location: [facility] OR;  Service: Urology;  Laterality: N/A;    INSERTION OF MULTIFOCAL INTRAOCULAR LENS Left 12/12/2018    Procedure: INSERTION, IOL, MULTIFOCAL;  Surgeon: Eleanor Seaman MD;  Location: [facility] OR;  Service: Ophthalmology;  Laterality: Left;    INSERTION OF MULTIFOCAL INTRAOCULAR LENS Right 4/3/2019    Procedure: INSERTION, IOL, MULTIFOCAL;  Surgeon: Eleanor Seaman MD;  Location: [facility] OR;  Service: Ophthalmology;  Laterality: Right;    NECK SURGERY  2002    PHACOEMULSIFICATION OF CATARACT Left 12/12/2018    Procedure: PHACOEMULSIFICATION, CATARACT;  Surgeon: Eleanor Seaman MD;  Location: [facility] OR;  Service: Ophthalmology;  Laterality: Left;    PHACOEMULSIFICATION OF CATARACT Right 4/3/2019    Procedure: PHACOEMULSIFICATION, CATARACT;  Surgeon: Eleanor Seaman MD;   Location: [facility] OR;  Service: Ophthalmology;  Laterality: Right;    PROSTATE SURGERY  2001    Radical prostectemy     SPINE SURGERY  2002    Fusion of c4,c5,c6    TONSILLECTOMY      TURBT (TRANSURETHRAL RESECTION OF BLADDER TUMOR) N/A 7/30/2024    Procedure: TURBT (TRANSURETHRAL RESECTION OF BLADDER TUMOR);  Surgeon: Fabian Bai MD;  Location: [facility] OR;  Service: Urology;  Laterality: N/A;    URETEROSCOPY Bilateral 7/30/2024    Procedure: URETEROSCOPY;  Surgeon: Fabian Bai MD;  Location: [facility] OR;  Service: Urology;  Laterality: Bilateral;       Soc Hx:  Tob: Yes - 51 pack years  Illicit drug use:No  Occupation: Retired  : Yes, [employer] Hx:   Malignancies:  Yes prostate cancer   Kidney stones:  No    Review of patient's allergies indicates:   Allergen Reactions    Pcn [penicillins] Anaphylaxis    Valium [diazepam] Anaphylaxis       Current Outpatient Medications on File Prior to Visit   Medication Sig Dispense Refill    albuterol 90 mcg/actuation inhaler Inhale 2 puffs into the lungs every 6 (six) hours as needed for Wheezing. Rescue 18 g 11    ascorbic acid, vitamin C, (VITAMIN C) 100 MG tablet Take 100 mg by mouth.      aspirin (ECOTRIN) 81 MG EC tablet Take 81 mg by mouth once daily.      cholecalciferol, vitamin D3, 10 mcg (400 unit) Cap capsule Take 1 tablet by mouth.      cyclobenzaprine (FLEXERIL) 10 MG tablet Take 10 mg by mouth 3 (three) times daily.      gabapentin (NEURONTIN) 300 MG capsule Take 300 mg by mouth 3 (three) times daily.      multivitamin (THERAGRAN) per tablet Take 1 tablet by mouth once daily.      rosuvastatin (CRESTOR) 40 MG Tab Take 40 mg by mouth.      umeclidinium (INCRUSE ELLIPTA) 62.5 mcg/actuation DsDv Inhale 1 Inhaler into the lungs once daily. Controller 30 each 11    zinc gluconate 50 mg tablet Take 50 mg by mouth.       No current facility-administered medications on file prior to visit.     Anticoagulation:  Yes ASA 81 mg.    Physical Exam:  weight 164  lb/75 kg  BMI 23.6    AAOx4, NAD, WDWN  NC/AT, EOMI, PER, sclerae anicteric, speech normal, tongue midline  Nl effort, CTAB  RRR  Soft, non-tender, non-distended,   Scars: Midline infraumbilical incision from prior prostatectomy, RLQ incision from prior appendectomy.       Labs:   Urine dipstick today shows positive for RBC's and positive for leukocytes    Lab Results   Component Value Date     08/22/2024    K 3.9 08/22/2024     08/22/2024    CO2 24 08/22/2024    BUN 15 08/22/2024    CREATININE 1.8 (H) 08/22/2024     08/22/2024    CALCIUM 9.5 08/22/2024     Lab Results   Component Value Date    WBC 10.26 08/06/2024    HGB 9.4 (L) 08/06/2024    HCT 30.2 (L) 08/06/2024     08/06/2024         TURBT (07/30/24) - Urinary bladder, transurethral resection of bladder tumor (TURBT):   - High-grade papillary urothelial carcinoma   - Muscularis propria is present and is involved by carcinoma   - Lymphovascular and perineural invasion are identified   - See synoptic report below   - Mismatch repair protein testing is pending and results will be reported in a supplemental report    Urine cytology  07/30/24 atypical urothelial cells    Imaging:  CT CHEST ABDOMEN PELVIS WITHOUT CONTRAST(XPD)     CLINICAL HISTORY:  Bladder cancer, invasive, assess treatment response;Muscle Invasive Bladder Cancer; Malignant neoplasm of overlapping sites of bladder     TECHNIQUE:  Noncontrast imaging of the chest, abdomen pelvis performed.     COMPARISON:  06/30/2024 and 05/22/2024     FINDINGS:  Chest:     Heart and great vessels: Atherosclerotic calcification present including prominent LAD coronary calcification.  No pericardial effusion or significant cardiac chamber enlargement.     Adenopathy: No pathologically enlarged axillary, mediastinal or hilar lymph nodes.     Lungs: Prominent emphysematous findings.  Particular changes present within the anterior inferior right lower lobe with accompanying small irregular  nodules.  Partially calcified irregular opacity within the anteromedial left upper lobe.  Multiple small nodules noted within the lingula, largest 4 mm.     Pleura: Small layering right pleural effusion.     Miscellaneous: There is circumferential thickening of the distal esophagus.  Minimal bilateral gynecomastia.     Abdomen:     Liver: Unremarkable.     Gallbladder and biliary: Unremarkable.     Spleen: Unremarkable.     Pancreas: Unremarkable.     Adrenals: Unremarkable.     Kidneys: Right kidney unchanged.  Similar bilateral perinephric stranding/scarring noted.  Interval placement of left ureteral stent with proximal catheter coil noted within extrarenal pelvis.  No significant hydronephrosis.  Distal catheter terminates within the bladder.  There is persistent thickening of the posterior left lateral bladder which appears more prominent but likely accentuated by incomplete distension.  Small amount of air within the anterior bladder with air noted outside the bladder in a linear orientation felt to be within the previously noted urachal remanent.     Stomach/Bowel: Stomach demonstrates tiny hiatal hernia.  Small bowel nonobstructive.  No acute colonic abnormality.     Peritoneum: No ascites or pneumoperitoneum.     Abdominal Adenopathy: None.     Vasculature: Atherosclerotic plaquing present.     Pelvis:     Urinary bladder: See kidneys.     Pelvis adenopathy: None.     Bones: No acute findings.     Miscellaneous: None.     Impression:     1. Persistent posterior left lateral bladder wall thickening possibly slightly more prominent but likely related to bladder nondistention.  Left ureteral stent now present.  There is a small amount of air within the anterior bladder with tear felt to extend outside the bladder within the previously noted urachal remanent.  Air felt to be related to recent instrumentation.  Correlate clinically.  2. Extensive emphysematous findings with bilateral scarring.  Multiple small  lingular pulmonary nodules present which may be stable and comparison with outside old chest CTs recommended.  Small irregular nodules within the inferior right upper lobe may represent nodular scarring.  Again comparison with older studies likely beneficial.  Attention on follow-up exams recommended to exclude malignancy/metastatic disease.  Partially calcified irregular opacity within the medial left upper lobe may be sequela of prior infectious or inflammatory etiology.  3. Small layering right pleural effusion.  4. Circumferential thickening of distal esophagus which may relate to esophagitis.  Correlate with EGD.  5. Diffuse atherosclerotic plaquing including prominent LAD coronary calcification.    EXAMINATION:  NM BONE SCAN WHOLE BODY     CLINICAL HISTORY:  Metastatic disease evaluation;  Malignant neoplasm of overlapping sites of bladder     TECHNIQUE:  Approximately 2-3 hours following the IV administration of 22.1 mCi of [id number]-MDP, anterior and posterior delayed whole body images were acquired.  Additional spot images acquired.     COMPARISON:  CT 08/06/2024.     FINDINGS:  There is physiologic distribution of the radiopharmaceutical throughout the skeleton.     Few foci of mild uptake in the right mid ribs anteriorly.  No corresponding abnormality on recent CT.     Scattered uptake throughout lumbar spine, most prominent superiorly noting hardware and degenerative change on recent CT.     There is normal uptake in the genitourinary system and soft tissues.     Impression:     No convincing scintigraphic evidence of osteoblastic metastatic disease.     Few foci of mild uptake in right mid ribs anteriorly.  Nonspecific, possibly traumatic.  No corresponding abnormality on recent CT.  Recommend attention on follow-up.     Increased tracer uptake in the lumbar spine, likely related to degenerative uptake and fusion hardware.      Assessment: 73 y.o.  yo M with HGMIUC (high grade muscle invasive urothelial  carcinoma) of the bladder. [id number] urothelial cell carcinoma of the bladder    Plan:  1. To OR on 09/16/24 for radical cystectomy with ileal conduit  2. Consents signed   3. I have explained the risk, benefits, and alternatives of the procedure in detail. The patient voices understanding and all questions have been answered. The patient agrees to proceed as planned.   4. Follow up medical clearance appointment, labs and EKG with Dr. Robert on 9/9/24.  5. Will send urine culture. Will follow up results.   6. Saw Cardiology on 9/3 in [place], underwent Cardiac Stress test and was deemed low risk.     Michael Mayo, DO

## 2024-09-10 ENCOUNTER — TELEPHONE (OUTPATIENT)
Dept: PREADMISSION TESTING | Facility: HOSPITAL | Age: 74
End: 2024-09-10
Payer: MEDICARE

## 2024-09-10 PROBLEM — D72.829 ELEVATED WBC COUNT: Status: ACTIVE | Noted: 2024-09-10

## 2024-09-10 LAB
BACTERIA UR CULT: NORMAL
BACTERIA UR CULT: NORMAL

## 2024-09-10 NOTE — ASSESSMENT & PLAN NOTE
Component      Latest Ref Rng 9/9/2024   WBC      3.90 - 12.70 K/uL 13.02 (H)       Legend:  (H) High    No overt signs or symptoms of infection  Repeat WBC next week  Instruct pt to call if he has any fever or s/s of infection

## 2024-09-12 PROBLEM — E88.09 HYPOALBUMINEMIA: Status: ACTIVE | Noted: 2024-09-12

## 2024-09-13 ENCOUNTER — ANESTHESIA EVENT (OUTPATIENT)
Dept: SURGERY | Facility: HOSPITAL | Age: 74
End: 2024-09-13
Payer: MEDICARE

## 2024-09-13 ENCOUNTER — TELEPHONE (OUTPATIENT)
Dept: UROLOGY | Facility: CLINIC | Age: 74
End: 2024-09-13
Payer: MEDICARE

## 2024-09-13 ENCOUNTER — TELEPHONE (OUTPATIENT)
Dept: UROLOGY | Facility: HOSPITAL | Age: 74
End: 2024-09-13
Payer: MEDICARE

## 2024-09-13 DIAGNOSIS — D49.4 BLADDER TUMOR: Primary | ICD-10-CM

## 2024-09-13 PROBLEM — Z92.89 H/O CARDIOVASCULAR STRESS TEST: Status: ACTIVE | Noted: 2024-09-13

## 2024-09-13 PROBLEM — I13.0 HYPERTENSIVE HEART AND CHRONIC KIDNEY DISEASE WITH HEART FAILURE AND STAGE 1 THROUGH STAGE 4 CHRONIC KIDNEY DISEASE, OR UNSPECIFIED CHRONIC KIDNEY DISEASE: Status: RESOLVED | Noted: 2024-08-29 | Resolved: 2024-09-13

## 2024-09-13 PROBLEM — Z92.89 H/O ECHOCARDIOGRAM: Status: ACTIVE | Noted: 2024-09-13

## 2024-09-13 PROBLEM — D72.829 ELEVATED WBC COUNT: Status: RESOLVED | Noted: 2024-09-10 | Resolved: 2024-09-13

## 2024-09-13 RX ORDER — SULFAMETHOXAZOLE AND TRIMETHOPRIM 800; 160 MG/1; MG/1
1 TABLET ORAL 2 TIMES DAILY
Qty: 6 TABLET | Refills: 0 | Status: SHIPPED | OUTPATIENT
Start: 2024-09-13 | End: 2024-09-16

## 2024-09-13 NOTE — TELEPHONE ENCOUNTER
"Urology Progress Note    Patient Ucx growing multiple organisms. Called patient to discuss and order repeat culture, will send in empiric Bactrim due to upcoming surgery. Patient asymptomatic, denies any urinary symptoms.    -- Will follow up repeat cultures     Please call with further questions or concerns.    Michael Mayo ([place]), DO  PGY-2, [facility]    "

## 2024-09-15 NOTE — ANESTHESIA PREPROCEDURE EVALUATION
[facility]  Anesthesia Pre-Operative Evaluation    Patient Name: Rhett Johnson  YOB: 1950  MRN: [id number]    SUBJECTIVE:   09/15/2024    Pre-operative evaluation for Procedure(s) (LRB):  CREATION, ILEAL CONDUIT (N/A)  CYSTECTOMY (N/A)    Rhett Johnson is a 73 y.o. male with a PMHx significant for HTN, COPD, GERD, RBBB, CKD3, prior prostate cancer, and bladder cancer. Per chart review, pt had anaphylactic reaction after diazepam but received midazolam at OSH in Oct 2023 without issue. Patient now presents for the above procedure(s).    Previous Airway  Date/Time: 7/30/2024 7:54 AM  Induction: Intravenous  Intubated: Postinduction  Mask Ventilation: Moderately difficult with oral airway  Attempts: 1  Attempted By: CRNA  Method of Intubation: Video laryngoscopy  Blade: Chance 3  Laryngeal View Grade: Grade I - full view of cords     LDA: None documented.       Ureteral Drain/Stent 07/30/24 0957 Left ureter 6 Fr. (Active)   Number of days: 47     Transthoracic Echo):  No results found for this or any previous visit.    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    Daily urinary incontinence    ETOH abuse    Fusion of spine, site unspecified    Gastroesophageal reflux disease    History of carotid artery disease    Intermittent claudication of both lower extremities due to atherosclerosis    Malignant neoplasm of prostate    Occlusion of left internal carotid artery    Pulmonary nodule, left    Right bundle branch block    Spinal stenosis, lumbar  region, with neurogenic claudication    Atherosclerosis of both carotid arteries    Chronic kidney disease, stage 3a    History of asbestos exposure    Neuropathy    Postlaminectomy syndrome of lumbar region    Vitamin D deficiency    Peripheral artery disease    Anemia    Hypoalbuminemia    H/O echocardiogram    H/O cardiovascular stress test       Review of patient's allergies indicates:   Allergen Reactions    Pcn [penicillins] Anaphylaxis    Valium [diazepam] Anaphylaxis       Current Outpatient Medications   Medication Instructions    albuterol 90 mcg/actuation inhaler 2 puffs, Inhalation, Every 6 hours PRN, Rescue    ascorbic acid (vitamin C) (VITAMIN C) 100 mg, Oral    aspirin (ECOTRIN) 81 mg, Oral, Daily    carvediloL (COREG) 12.5 MG tablet     cholecalciferol, vitamin D3, 10 mcg (400 unit) Cap capsule 1 tablet, Oral    cyclobenzaprine (FLEXERIL) 10 mg, Oral, 3 times daily    furosemide (LASIX) 40 MG tablet Daily PRN    gabapentin (NEURONTIN) 300 mg, Oral, 3 times daily    multivitamin (THERAGRAN) per tablet 1 tablet, Oral, Daily    rosuvastatin (CRESTOR) 40 mg, Oral    sulfamethoxazole-trimethoprim 800-160mg (BACTRIM DS) 800-160 mg Tab 1 tablet, Oral, 2 times daily    umeclidinium (INCRUSE ELLIPTA) 62.5 mcg/actuation DsDv 1 Inhaler, Inhalation, Daily, Controller    zinc gluconate 50 mg, Oral, 2 tablets per day       Past Surgical History:   Procedure Laterality Date    ADENOIDECTOMY      APPENDECTOMY      Arthroscopic left knee Left     BARBOTAGE OF BLADDER N/A 7/30/2024    Procedure: BARBOTAGE, BLADDER;  Surgeon: Fabian Bai MD;  Location: [facility] OR;  Service: Urology;  Laterality: N/A;    BARBOTAGE OF URETER Bilateral 7/30/2024    Procedure: BARBOTAGE, URETER;  Surgeon: Fabian Bai MD;  Location: [facility] OR;  Service: Urology;  Laterality: Bilateral;    CARDIAC CATHETERIZATION      no stents    COLONOSCOPY      CYSTOSCOPY W/ RETROGRADES Bilateral 7/30/2024    Procedure: CYSTOSCOPY, WITH RETROGRADE  PYELOGRAM;  Surgeon: Fabian Bai MD;  Location: [facility] OR;  Service: Urology;  Laterality: Bilateral;    CYSTOSCOPY W/ URETERAL STENT PLACEMENT Left 7/30/2024    Procedure: CYSTOSCOPY, WITH URETERAL STENT INSERTION;  Surgeon: Fabian Bai MD;  Location: [facility] OR;  Service: Urology;  Laterality: Left;    DILATION OF URETER Left 7/30/2024    Procedure: DILATION, URETER;  Surgeon: Fabian Bai MD;  Location: [facility] OR;  Service: Urology;  Laterality: Left;    DILATION OF URETHRA N/A 7/30/2024    Procedure: DILATION, URETHRA;  Surgeon: Fabian Bai MD;  Location: [facility] OR;  Service: Urology;  Laterality: N/A;    INSERTION OF MULTIFOCAL INTRAOCULAR LENS Left 12/12/2018    Procedure: INSERTION, IOL, MULTIFOCAL;  Surgeon: Eleanor Seaman MD;  Location: [facility] OR;  Service: Ophthalmology;  Laterality: Left;    INSERTION OF MULTIFOCAL INTRAOCULAR LENS Right 4/3/2019    Procedure: INSERTION, IOL, MULTIFOCAL;  Surgeon: Eleanor Seaman MD;  Location: [facility] OR;  Service: Ophthalmology;  Laterality: Right;    NECK SURGERY  2002    PHACOEMULSIFICATION OF CATARACT Left 12/12/2018    Procedure: PHACOEMULSIFICATION, CATARACT;  Surgeon: Eleanor Seaman MD;  Location: [facility] OR;  Service: Ophthalmology;  Laterality: Left;    PHACOEMULSIFICATION OF CATARACT Right 4/3/2019    Procedure: PHACOEMULSIFICATION, CATARACT;  Surgeon: Eleanor Seaman MD;  Location: [facility] OR;  Service: Ophthalmology;  Laterality: Right;    PROSTATE SURGERY  2001    Radical prostectemy     SPINE SURGERY  2002    Fusion of c4,c5,c6    TONSILLECTOMY      TURBT (TRANSURETHRAL RESECTION OF BLADDER TUMOR) N/A 7/30/2024    Procedure: TURBT (TRANSURETHRAL RESECTION OF BLADDER TUMOR);  Surgeon: Fabian Bai MD;  Location: [facility] OR;  Service: Urology;  Laterality: N/A;    URETEROSCOPY Bilateral 7/30/2024    Procedure: URETEROSCOPY;  Surgeon: Fabian Bai MD;  Location: [facility] OR;  Service: Urology;  Laterality: Bilateral;       Social History     Substance and  "Sexual Activity   Drug Use Never     Alcohol Use: Not At Risk (9/2/2024)    AUDIT-C     Frequency of Alcohol Consumption: Monthly or less     Average Number of Drinks: 1 or 2     Frequency of Binge Drinking: Less than monthly     Tobacco Use: Medium Risk (9/9/2024)    Patient History     Smoking Tobacco Use: Former     Smokeless Tobacco Use: Never     Passive Exposure: Not on file       OBJECTIVE:     Vital Signs Range:      8/16/2024     2:07 PM 8/22/2024    10:22 AM 9/9/2024     2:23 PM   Vitals - 1 value per visit   SYSTOLIC 106 97 213   DIASTOLIC 52 58 92   Pulse 71 76    Temp   36.4 °C (97.6 °F)   SPO2   99 %   Weight (lb) 165.35 164.9 169.75   Weight (kg) 75 74.8 77   Height 5' 10" (1.778 m) 5' 10" (1.778 m) 5' 10" (1.778 m)   BMI (Calculated) 23.7 23.7 24.4   Pain Score Zero Zero Zero       CBC:   Lab Results   Component Value Date    WBC 9.34 09/13/2024    HGB 11.3 (L) 09/13/2024    HCT 36.5 (L) 09/13/2024    MCV 96 09/13/2024     09/13/2024         CMP:   Sodium   Date Value Ref Range Status   08/22/2024 139 136 - 145 mmol/L Final     Potassium   Date Value Ref Range Status   08/22/2024 3.9 3.5 - 5.1 mmol/L Final     Chloride   Date Value Ref Range Status   08/22/2024 106 95 - 110 mmol/L Final     CO2   Date Value Ref Range Status   08/22/2024 24 23 - 29 mmol/L Final     Glucose   Date Value Ref Range Status   08/22/2024 108 70 - 110 mg/dL Final     BUN   Date Value Ref Range Status   08/22/2024 15 8 - 23 mg/dL Final     Creatinine   Date Value Ref Range Status   08/22/2024 1.8 (H) 0.5 - 1.4 mg/dL Final     Calcium   Date Value Ref Range Status   08/22/2024 9.5 8.7 - 10.5 mg/dL Final     Total Protein   Date Value Ref Range Status   08/22/2024 6.7 6.0 - 8.4 g/dL Final     Albumin   Date Value Ref Range Status   08/22/2024 3.4 (L) 3.5 - 5.2 g/dL Final     Total Bilirubin   Date Value Ref Range Status   08/22/2024 0.4 0.1 - 1.0 mg/dL Final     Comment:     For infants and newborns, interpretation of " results should be based  on gestational age, weight and in agreement with clinical  observations.    Premature Infant recommended reference ranges:  Up to 24 hours.............<8.0 mg/dL  Up to 48 hours............<12.0 mg/dL  3-5 days..................<15.0 mg/dL  6-29 days.................<15.0 mg/dL       Alkaline Phosphatase   Date Value Ref Range Status   08/22/2024 76 55 - 135 U/L Final     AST   Date Value Ref Range Status   08/22/2024 19 10 - 40 U/L Final     ALT   Date Value Ref Range Status   08/22/2024 17 10 - 44 U/L Final     Anion Gap   Date Value Ref Range Status   08/22/2024 9 8 - 16 mmol/L Final     eGFR if    Date Value Ref Range Status   02/16/2017 >60.0 >60 mL/min/1.73 m^2 Final     eGFR    Date Value Ref Range Status   05/24/2022 51 (L) >89 mL/min Final     eGFR if non    Date Value Ref Range Status   02/16/2017 >60.0 >60 mL/min/1.73 m^2 Final     Comment:     Calculation used to obtain the estimated glomerular filtration  rate (eGFR) is the CKD-EPI equation. Since race is unknown   in our information system, the eGFR values for   -American and Non--American patients are given   for each creatinine result.       INR:  Lab Results   Component Value Date    INR 1.0 05/22/2024    INR 1.1 05/17/2022    INR 1.0 05/12/2022       Cardiac Studies    EKG:   Results for orders placed or performed in visit on 07/29/24   EKG 12-lead    Collection Time: 07/29/24  6:30 AM   Result Value Ref Range    QRS Duration 114 ms    [facility] QTC Calculation 460 ms    Narrative    Test Reason : D49.4,N13.5,N13.30,    Vent. Rate : 070 BPM     Atrial Rate : 070 BPM     P-R Int : 134 ms          QRS Dur : 114 ms      QT Int : 426 ms       P-R-T Axes : 063 065 053 degrees     QTc Int : 460 ms    Normal sinus rhythm  Incomplete right bundle branch block  Nonspecific ST and/or T wave abnormalities  Abnormal ECG  When compared with ECG of 13-MAR-2023 09:10,  Vent. rate has  "decreased BY  45 BPM  Septal infarct is now Present  Confirmed by Edmond Manning MD, Armen ([id number]) on 7/29/2024 10:22:18 PM    Referred By: NIKO BRUNNER           Confirmed By:Armen Manning,     ASSESSMENT/PLAN:                                                                                                                09/15/2024  Rhett Johnson is a 73 y.o., male.    Pre-op Assessment    I have reviewed the Patient Summary Reports.     I have reviewed the Nursing Notes. I have reviewed the NPO Status.   I have reviewed the Medications.     Review of Systems  Anesthesia Hx:  No problems with previous Anesthesia             Denies Family Hx of Anesthesia complications.    Denies Personal Hx of Anesthesia complications.                    Social:  Alcohol Use, Former Smoker       Hematology/Oncology:                      Current/Recent Cancer.                Cardiovascular:     Hypertension    Dysrhythmias (Pt last saw cards about one week ago for "heart skipping a beat" EKG done and reviewed.)                                    Pulmonary:   COPD                     Renal/:  Chronic Renal Disease, CKD                Hepatic/GI:     GERD             Neurological:    Neuromuscular Disease,                                       Physical Exam  General: Alert and Oriented    Airway:  Mallampati: III   Mouth Opening: Normal  TM Distance: Normal  Tongue: Normal        Anesthesia Plan  Type of Anesthesia, risks & benefits discussed:    Anesthesia Type: Gen ETT  Intra-op Monitoring Plan: Standard ASA Monitors and Art Line  Post Op Pain Control Plan: multimodal analgesia and peripheral nerve block  Induction:  IV  Airway Plan: Video and Direct, Post-Induction  Informed Consent: Informed consent signed with the Patient and all parties understand the risks and agree with anesthesia plan.  All questions answered. Patient consented to blood products? Yes  ASA Score: 3  Day of Surgery Review of History & Physical: H&P " Update referred to the surgeon/provider.    Ready For Surgery From Anesthesia Perspective.     .

## 2024-09-16 ENCOUNTER — ANESTHESIA (OUTPATIENT)
Dept: SURGERY | Facility: HOSPITAL | Age: 74
End: 2024-09-16
Payer: MEDICARE

## 2024-09-16 ENCOUNTER — HOSPITAL ENCOUNTER (INPATIENT)
Facility: HOSPITAL | Age: 74
LOS: 4 days | Discharge: HOME HEALTH | DRG: 654 | End: 2024-09-20
Attending: UROLOGY | Admitting: UROLOGY
Payer: MEDICARE

## 2024-09-16 DIAGNOSIS — Z01.818 PREOP TESTING: Primary | ICD-10-CM

## 2024-09-16 DIAGNOSIS — D49.4 BLADDER TUMOR: ICD-10-CM

## 2024-09-16 LAB
ABO + RH BLD: NORMAL
ANION GAP SERPL CALC-SCNC: 6 MMOL/L (ref 8–16)
BASOPHILS # BLD AUTO: 0.04 K/UL (ref 0–0.2)
BASOPHILS NFR BLD: 0.3 % (ref 0–1.9)
BLD GP AB SCN CELLS X3 SERPL QL: NORMAL
BUN SERPL-MCNC: 15 MG/DL (ref 8–23)
CALCIUM SERPL-MCNC: 9.2 MG/DL (ref 8.7–10.5)
CHLORIDE SERPL-SCNC: 108 MMOL/L (ref 95–110)
CO2 SERPL-SCNC: 20 MMOL/L (ref 23–29)
CREAT SERPL-MCNC: 1.6 MG/DL (ref 0.5–1.4)
DIFFERENTIAL METHOD BLD: ABNORMAL
EOSINOPHIL # BLD AUTO: 0 K/UL (ref 0–0.5)
EOSINOPHIL NFR BLD: 0.2 % (ref 0–8)
ERYTHROCYTE [DISTWIDTH] IN BLOOD BY AUTOMATED COUNT: 12.9 % (ref 11.5–14.5)
EST. GFR  (NO RACE VARIABLE): 45.5 ML/MIN/1.73 M^2
GLUCOSE SERPL-MCNC: 143 MG/DL (ref 70–110)
HCT VFR BLD AUTO: 32.1 % (ref 40–54)
HGB BLD-MCNC: 9.7 G/DL (ref 14–18)
IMM GRANULOCYTES # BLD AUTO: 0.22 K/UL (ref 0–0.04)
IMM GRANULOCYTES NFR BLD AUTO: 1.4 % (ref 0–0.5)
LYMPHOCYTES # BLD AUTO: 0.8 K/UL (ref 1–4.8)
LYMPHOCYTES NFR BLD: 4.9 % (ref 18–48)
MAGNESIUM SERPL-MCNC: 2.2 MG/DL (ref 1.6–2.6)
MCH RBC QN AUTO: 30.1 PG (ref 27–31)
MCHC RBC AUTO-ENTMCNC: 30.2 G/DL (ref 32–36)
MCV RBC AUTO: 100 FL (ref 82–98)
MONOCYTES # BLD AUTO: 0.9 K/UL (ref 0.3–1)
MONOCYTES NFR BLD: 5.9 % (ref 4–15)
NEUTROPHILS # BLD AUTO: 13.9 K/UL (ref 1.8–7.7)
NEUTROPHILS NFR BLD: 87.3 % (ref 38–73)
NRBC BLD-RTO: 0 /100 WBC
PHOSPHATE SERPL-MCNC: 4 MG/DL (ref 2.7–4.5)
PLATELET # BLD AUTO: 246 K/UL (ref 150–450)
PMV BLD AUTO: 9.5 FL (ref 9.2–12.9)
POCT GLUCOSE: 367 MG/DL (ref 70–110)
POTASSIUM SERPL-SCNC: 6 MMOL/L (ref 3.5–5.1)
RBC # BLD AUTO: 3.22 M/UL (ref 4.6–6.2)
SODIUM SERPL-SCNC: 134 MMOL/L (ref 136–145)
SPECIMEN OUTDATE: NORMAL
WBC # BLD AUTO: 15.94 K/UL (ref 3.9–12.7)

## 2024-09-16 PROCEDURE — 88305 TISSUE EXAM BY PATHOLOGIST: CPT | Mod: 59 | Performed by: PATHOLOGY

## 2024-09-16 PROCEDURE — 71000033 HC RECOVERY, INTIAL HOUR: Performed by: UROLOGY

## 2024-09-16 PROCEDURE — 80048 BASIC METABOLIC PNL TOTAL CA: CPT | Performed by: STUDENT IN AN ORGANIZED HEALTH CARE EDUCATION/TRAINING PROGRAM

## 2024-09-16 PROCEDURE — 25000003 PHARM REV CODE 250

## 2024-09-16 PROCEDURE — 85025 COMPLETE CBC W/AUTO DIFF WBC: CPT | Performed by: STUDENT IN AN ORGANIZED HEALTH CARE EDUCATION/TRAINING PROGRAM

## 2024-09-16 PROCEDURE — 63600175 PHARM REV CODE 636 W HCPCS: Mod: JZ,JG

## 2024-09-16 PROCEDURE — 84100 ASSAY OF PHOSPHORUS: CPT | Performed by: STUDENT IN AN ORGANIZED HEALTH CARE EDUCATION/TRAINING PROGRAM

## 2024-09-16 PROCEDURE — 88309 TISSUE EXAM BY PATHOLOGIST: CPT | Performed by: PATHOLOGY

## 2024-09-16 PROCEDURE — 63600175 PHARM REV CODE 636 W HCPCS: Mod: JZ,JG | Performed by: SURGERY

## 2024-09-16 PROCEDURE — 71000015 HC POSTOP RECOV 1ST HR: Performed by: UROLOGY

## 2024-09-16 PROCEDURE — 25000003 PHARM REV CODE 250: Performed by: UROLOGY

## 2024-09-16 PROCEDURE — 71000016 HC POSTOP RECOV ADDL HR: Performed by: UROLOGY

## 2024-09-16 PROCEDURE — 63600175 PHARM REV CODE 636 W HCPCS

## 2024-09-16 PROCEDURE — 63600175 PHARM REV CODE 636 W HCPCS: Performed by: STUDENT IN AN ORGANIZED HEALTH CARE EDUCATION/TRAINING PROGRAM

## 2024-09-16 PROCEDURE — 25000003 PHARM REV CODE 250: Performed by: NURSE ANESTHETIST, CERTIFIED REGISTERED

## 2024-09-16 PROCEDURE — 25000003 PHARM REV CODE 250: Performed by: STUDENT IN AN ORGANIZED HEALTH CARE EDUCATION/TRAINING PROGRAM

## 2024-09-16 PROCEDURE — C1729 CATH, DRAINAGE: HCPCS | Performed by: UROLOGY

## 2024-09-16 PROCEDURE — 83735 ASSAY OF MAGNESIUM: CPT | Performed by: STUDENT IN AN ORGANIZED HEALTH CARE EDUCATION/TRAINING PROGRAM

## 2024-09-16 PROCEDURE — 86850 RBC ANTIBODY SCREEN: CPT

## 2024-09-16 PROCEDURE — 86900 BLOOD TYPING SEROLOGIC ABO: CPT

## 2024-09-16 PROCEDURE — 88331 PATH CONSLTJ SURG 1 BLK 1SPC: CPT | Performed by: PATHOLOGY

## 2024-09-16 PROCEDURE — 71000039 HC RECOVERY, EACH ADD'L HOUR: Performed by: UROLOGY

## 2024-09-16 PROCEDURE — 0T1807C BYPASS BILATERAL URETERS TO ILEOCUTANEOUS WITH AUTOLOGOUS TISSUE SUBSTITUTE, OPEN APPROACH: ICD-10-PCS | Performed by: UROLOGY

## 2024-09-16 PROCEDURE — 20600001 HC STEP DOWN PRIVATE ROOM

## 2024-09-16 PROCEDURE — C2617 STENT, NON-COR, TEM W/O DEL: HCPCS | Performed by: UROLOGY

## 2024-09-16 PROCEDURE — 07BC0ZZ EXCISION OF PELVIS LYMPHATIC, OPEN APPROACH: ICD-10-PCS | Performed by: UROLOGY

## 2024-09-16 PROCEDURE — 86920 COMPATIBILITY TEST SPIN: CPT

## 2024-09-16 PROCEDURE — 37000009 HC ANESTHESIA EA ADD 15 MINS: Performed by: UROLOGY

## 2024-09-16 PROCEDURE — 27201423 OPTIME MED/SURG SUP & DEVICES STERILE SUPPLY: Performed by: UROLOGY

## 2024-09-16 PROCEDURE — 64999 UNLISTED PX NERVOUS SYSTEM: CPT | Mod: 59,,, | Performed by: SURGERY

## 2024-09-16 PROCEDURE — 0TTB0ZZ RESECTION OF BLADDER, OPEN APPROACH: ICD-10-PCS | Performed by: UROLOGY

## 2024-09-16 PROCEDURE — 37000008 HC ANESTHESIA 1ST 15 MINUTES: Performed by: UROLOGY

## 2024-09-16 PROCEDURE — 0TB70ZZ EXCISION OF LEFT URETER, OPEN APPROACH: ICD-10-PCS | Performed by: UROLOGY

## 2024-09-16 PROCEDURE — 76942 ECHO GUIDE FOR BIOPSY: CPT

## 2024-09-16 PROCEDURE — 36000709 HC OR TIME LEV III EA ADD 15 MIN: Performed by: UROLOGY

## 2024-09-16 PROCEDURE — 63600175 PHARM REV CODE 636 W HCPCS: Performed by: NURSE ANESTHETIST, CERTIFIED REGISTERED

## 2024-09-16 PROCEDURE — 36000708 HC OR TIME LEV III 1ST 15 MIN: Performed by: UROLOGY

## 2024-09-16 DEVICE — STENT SET URET DIV 7FR 75CM: Type: IMPLANTABLE DEVICE | Site: URETER | Status: FUNCTIONAL

## 2024-09-16 RX ORDER — PROPOFOL 10 MG/ML
VIAL (ML) INTRAVENOUS
Status: DISCONTINUED | OUTPATIENT
Start: 2024-09-16 | End: 2024-09-16

## 2024-09-16 RX ORDER — SODIUM CHLORIDE, SODIUM LACTATE, POTASSIUM CHLORIDE, CALCIUM CHLORIDE 600; 310; 30; 20 MG/100ML; MG/100ML; MG/100ML; MG/100ML
INJECTION, SOLUTION INTRAVENOUS CONTINUOUS
Status: DISCONTINUED | OUTPATIENT
Start: 2024-09-16 | End: 2024-09-20 | Stop reason: HOSPADM

## 2024-09-16 RX ORDER — PROCHLORPERAZINE EDISYLATE 5 MG/ML
5 INJECTION INTRAMUSCULAR; INTRAVENOUS EVERY 6 HOURS PRN
Status: DISCONTINUED | OUTPATIENT
Start: 2024-09-16 | End: 2024-09-20 | Stop reason: HOSPADM

## 2024-09-16 RX ORDER — ONDANSETRON HYDROCHLORIDE 2 MG/ML
INJECTION, SOLUTION INTRAVENOUS
Status: DISCONTINUED | OUTPATIENT
Start: 2024-09-16 | End: 2024-09-16

## 2024-09-16 RX ORDER — ATORVASTATIN CALCIUM 40 MG/1
80 TABLET, FILM COATED ORAL DAILY
Status: DISCONTINUED | OUTPATIENT
Start: 2024-09-17 | End: 2024-09-20 | Stop reason: HOSPADM

## 2024-09-16 RX ORDER — METHOCARBAMOL 500 MG/1
500 TABLET, FILM COATED ORAL EVERY 6 HOURS PRN
Status: DISCONTINUED | OUTPATIENT
Start: 2024-09-16 | End: 2024-09-17

## 2024-09-16 RX ORDER — CYCLOBENZAPRINE HCL 5 MG
10 TABLET ORAL 3 TIMES DAILY PRN
Status: DISCONTINUED | OUTPATIENT
Start: 2024-09-16 | End: 2024-09-17

## 2024-09-16 RX ORDER — ALVIMOPAN 12 MG/1
12 CAPSULE ORAL
Status: COMPLETED | OUTPATIENT
Start: 2024-09-16 | End: 2024-09-16

## 2024-09-16 RX ORDER — OXYCODONE HYDROCHLORIDE 10 MG/1
10 TABLET ORAL EVERY 4 HOURS PRN
Status: DISCONTINUED | OUTPATIENT
Start: 2024-09-16 | End: 2024-09-17

## 2024-09-16 RX ORDER — GABAPENTIN 300 MG/1
300 CAPSULE ORAL EVERY 8 HOURS
Status: DISCONTINUED | OUTPATIENT
Start: 2024-09-16 | End: 2024-09-20 | Stop reason: HOSPADM

## 2024-09-16 RX ORDER — OXYCODONE HYDROCHLORIDE 5 MG/1
5 TABLET ORAL EVERY 4 HOURS PRN
Status: DISCONTINUED | OUTPATIENT
Start: 2024-09-16 | End: 2024-09-20 | Stop reason: HOSPADM

## 2024-09-16 RX ORDER — HYDROMORPHONE HYDROCHLORIDE 1 MG/ML
INJECTION, SOLUTION INTRAMUSCULAR; INTRAVENOUS; SUBCUTANEOUS
Status: DISPENSED
Start: 2024-09-16 | End: 2024-09-17

## 2024-09-16 RX ORDER — PHENYLEPHRINE HCL IN 0.9% NACL 1 MG/10 ML
SYRINGE (ML) INTRAVENOUS
Status: DISCONTINUED | OUTPATIENT
Start: 2024-09-16 | End: 2024-09-16

## 2024-09-16 RX ORDER — FAMOTIDINE 10 MG/ML
20 INJECTION INTRAVENOUS 2 TIMES DAILY
Status: DISCONTINUED | OUTPATIENT
Start: 2024-09-16 | End: 2024-09-17

## 2024-09-16 RX ORDER — VANCOMYCIN HYDROCHLORIDE 1 G/20ML
INJECTION, POWDER, LYOPHILIZED, FOR SOLUTION INTRAVENOUS
Status: DISCONTINUED | OUTPATIENT
Start: 2024-09-16 | End: 2024-09-16

## 2024-09-16 RX ORDER — ROPIVACAINE HYDROCHLORIDE 2 MG/ML
INJECTION, SOLUTION EPIDURAL; INFILTRATION; PERINEURAL CONTINUOUS
Status: DISCONTINUED | OUTPATIENT
Start: 2024-09-16 | End: 2024-09-19

## 2024-09-16 RX ORDER — POLYETHYLENE GLYCOL 3350 17 G/17G
17 POWDER, FOR SOLUTION ORAL DAILY
Status: DISCONTINUED | OUTPATIENT
Start: 2024-09-17 | End: 2024-09-20 | Stop reason: HOSPADM

## 2024-09-16 RX ORDER — HEPARIN SODIUM 5000 [USP'U]/ML
5000 INJECTION, SOLUTION INTRAVENOUS; SUBCUTANEOUS EVERY 8 HOURS
Status: DISCONTINUED | OUTPATIENT
Start: 2024-09-16 | End: 2024-09-20 | Stop reason: HOSPADM

## 2024-09-16 RX ORDER — PREGABALIN 75 MG/1
75 CAPSULE ORAL
Status: DISCONTINUED | OUTPATIENT
Start: 2024-09-16 | End: 2024-09-16

## 2024-09-16 RX ORDER — FENTANYL CITRATE 50 UG/ML
25-200 INJECTION, SOLUTION INTRAMUSCULAR; INTRAVENOUS
Status: DISCONTINUED | OUTPATIENT
Start: 2024-09-16 | End: 2024-09-16

## 2024-09-16 RX ORDER — DEXAMETHASONE SODIUM PHOSPHATE 4 MG/ML
INJECTION, SOLUTION INTRA-ARTICULAR; INTRALESIONAL; INTRAMUSCULAR; INTRAVENOUS; SOFT TISSUE
Status: DISCONTINUED | OUTPATIENT
Start: 2024-09-16 | End: 2024-09-16

## 2024-09-16 RX ORDER — FENTANYL CITRATE 50 UG/ML
INJECTION, SOLUTION INTRAMUSCULAR; INTRAVENOUS
Status: DISCONTINUED | OUTPATIENT
Start: 2024-09-16 | End: 2024-09-16

## 2024-09-16 RX ORDER — PREGABALIN 75 MG/1
75 CAPSULE ORAL NIGHTLY
Status: DISCONTINUED | OUTPATIENT
Start: 2024-09-16 | End: 2024-09-16

## 2024-09-16 RX ORDER — HYDROMORPHONE HYDROCHLORIDE 1 MG/ML
0.2 INJECTION, SOLUTION INTRAMUSCULAR; INTRAVENOUS; SUBCUTANEOUS EVERY 5 MIN PRN
Status: DISCONTINUED | OUTPATIENT
Start: 2024-09-16 | End: 2024-09-16 | Stop reason: HOSPADM

## 2024-09-16 RX ORDER — BUPIVACAINE HYDROCHLORIDE 7.5 MG/ML
INJECTION, SOLUTION EPIDURAL; RETROBULBAR
Status: COMPLETED | OUTPATIENT
Start: 2024-09-16 | End: 2024-09-16

## 2024-09-16 RX ORDER — SODIUM CHLORIDE 9 MG/ML
INJECTION, SOLUTION INTRAVENOUS CONTINUOUS
Status: DISCONTINUED | OUTPATIENT
Start: 2024-09-16 | End: 2024-09-16

## 2024-09-16 RX ORDER — DIPHENHYDRAMINE HYDROCHLORIDE 50 MG/ML
12.5 INJECTION INTRAMUSCULAR; INTRAVENOUS EVERY 6 HOURS PRN
Status: DISCONTINUED | OUTPATIENT
Start: 2024-09-16 | End: 2024-09-20 | Stop reason: HOSPADM

## 2024-09-16 RX ORDER — LABETALOL HCL 20 MG/4 ML
10 SYRINGE (ML) INTRAVENOUS
Status: COMPLETED | OUTPATIENT
Start: 2024-09-16 | End: 2024-09-16

## 2024-09-16 RX ORDER — SODIUM CHLORIDE 0.9 % (FLUSH) 0.9 %
10 SYRINGE (ML) INJECTION
Status: DISCONTINUED | OUTPATIENT
Start: 2024-09-16 | End: 2024-09-16 | Stop reason: HOSPADM

## 2024-09-16 RX ORDER — CARVEDILOL 12.5 MG/1
12.5 TABLET ORAL DAILY
Status: DISCONTINUED | OUTPATIENT
Start: 2024-09-17 | End: 2024-09-20 | Stop reason: HOSPADM

## 2024-09-16 RX ORDER — ACETAMINOPHEN 325 MG/1
650 TABLET ORAL EVERY 6 HOURS
Status: DISCONTINUED | OUTPATIENT
Start: 2024-09-16 | End: 2024-09-19

## 2024-09-16 RX ORDER — ACETAMINOPHEN 10 MG/ML
INJECTION, SOLUTION INTRAVENOUS
Status: DISCONTINUED | OUTPATIENT
Start: 2024-09-16 | End: 2024-09-16

## 2024-09-16 RX ORDER — ONDANSETRON HYDROCHLORIDE 2 MG/ML
4 INJECTION, SOLUTION INTRAVENOUS DAILY PRN
Status: DISCONTINUED | OUTPATIENT
Start: 2024-09-16 | End: 2024-09-16 | Stop reason: HOSPADM

## 2024-09-16 RX ORDER — GLUCAGON 1 MG
1 KIT INJECTION
Status: DISCONTINUED | OUTPATIENT
Start: 2024-09-16 | End: 2024-09-16 | Stop reason: HOSPADM

## 2024-09-16 RX ORDER — ALVIMOPAN 12 MG/1
12 CAPSULE ORAL 2 TIMES DAILY
Status: DISCONTINUED | OUTPATIENT
Start: 2024-09-17 | End: 2024-09-19

## 2024-09-16 RX ORDER — LIDOCAINE HYDROCHLORIDE 20 MG/ML
INJECTION, SOLUTION EPIDURAL; INFILTRATION; INTRACAUDAL; PERINEURAL
Status: DISCONTINUED | OUTPATIENT
Start: 2024-09-16 | End: 2024-09-16

## 2024-09-16 RX ORDER — PHENYLEPHRINE HYDROCHLORIDE 10 MG/ML
INJECTION INTRAVENOUS
Status: DISCONTINUED | OUTPATIENT
Start: 2024-09-16 | End: 2024-09-16

## 2024-09-16 RX ORDER — ACETAMINOPHEN 500 MG
1000 TABLET ORAL
Status: DISCONTINUED | OUTPATIENT
Start: 2024-09-16 | End: 2024-09-16

## 2024-09-16 RX ORDER — ROCURONIUM BROMIDE 10 MG/ML
INJECTION, SOLUTION INTRAVENOUS
Status: DISCONTINUED | OUTPATIENT
Start: 2024-09-16 | End: 2024-09-16

## 2024-09-16 RX ORDER — HYDROMORPHONE HYDROCHLORIDE 1 MG/ML
1 INJECTION, SOLUTION INTRAMUSCULAR; INTRAVENOUS; SUBCUTANEOUS
Status: DISCONTINUED | OUTPATIENT
Start: 2024-09-16 | End: 2024-09-17

## 2024-09-16 RX ORDER — DEXMEDETOMIDINE HYDROCHLORIDE 100 UG/ML
INJECTION, SOLUTION INTRAVENOUS
Status: DISCONTINUED | OUTPATIENT
Start: 2024-09-16 | End: 2024-09-16

## 2024-09-16 RX ORDER — LABETALOL HCL 20 MG/4 ML
SYRINGE (ML) INTRAVENOUS
Status: COMPLETED
Start: 2024-09-16 | End: 2024-09-16

## 2024-09-16 RX ORDER — KETAMINE HCL IN 0.9 % NACL 50 MG/5 ML
SYRINGE (ML) INTRAVENOUS
Status: DISCONTINUED | OUTPATIENT
Start: 2024-09-16 | End: 2024-09-16

## 2024-09-16 RX ORDER — LIDOCAINE HYDROCHLORIDE ANHYDROUS AND DEXTROSE MONOHYDRATE .8; 5 G/100ML; G/100ML
INJECTION, SOLUTION INTRAVENOUS CONTINUOUS PRN
Status: DISCONTINUED | OUTPATIENT
Start: 2024-09-16 | End: 2024-09-16

## 2024-09-16 RX ORDER — ONDANSETRON HYDROCHLORIDE 2 MG/ML
4 INJECTION, SOLUTION INTRAVENOUS EVERY 8 HOURS PRN
Status: DISCONTINUED | OUTPATIENT
Start: 2024-09-16 | End: 2024-09-20 | Stop reason: HOSPADM

## 2024-09-16 RX ADMIN — FENTANYL CITRATE 100 MCG: 50 INJECTION, SOLUTION INTRAMUSCULAR; INTRAVENOUS at 10:09

## 2024-09-16 RX ADMIN — DEXTROSE MONOHYDRATE 500 ML: 100 INJECTION, SOLUTION INTRAVENOUS at 08:09

## 2024-09-16 RX ADMIN — LIDOCAINE HYDROCHLORIDE ANHYDROUS AND DEXTROSE MONOHYDRATE 0.02 MG/KG/MIN: .8; 5 INJECTION, SOLUTION INTRAVENOUS at 01:09

## 2024-09-16 RX ADMIN — LIDOCAINE HYDROCHLORIDE 60 MG: 20 INJECTION, SOLUTION EPIDURAL; INFILTRATION; INTRACAUDAL at 12:09

## 2024-09-16 RX ADMIN — METHOCARBAMOL 500 MG: 500 TABLET ORAL at 07:09

## 2024-09-16 RX ADMIN — Medication 200 MCG: at 12:09

## 2024-09-16 RX ADMIN — SODIUM CHLORIDE, POTASSIUM CHLORIDE, SODIUM LACTATE AND CALCIUM CHLORIDE: 600; 310; 30; 20 INJECTION, SOLUTION INTRAVENOUS at 07:09

## 2024-09-16 RX ADMIN — Medication 200 MCG: at 01:09

## 2024-09-16 RX ADMIN — ONDANSETRON 4 MG: 2 INJECTION INTRAMUSCULAR; INTRAVENOUS at 05:09

## 2024-09-16 RX ADMIN — CALCIUM GLUCONATE 1 G: 98 INJECTION, SOLUTION INTRAVENOUS at 08:09

## 2024-09-16 RX ADMIN — BUPIVACAINE HYDROCHLORIDE 30 ML: 7.5 INJECTION, SOLUTION EPIDURAL; RETROBULBAR at 10:09

## 2024-09-16 RX ADMIN — HYDROMORPHONE HYDROCHLORIDE 0.2 MG: 1 INJECTION, SOLUTION INTRAMUSCULAR; INTRAVENOUS; SUBCUTANEOUS at 08:09

## 2024-09-16 RX ADMIN — INSULIN HUMAN 7.71 UNITS: 100 INJECTION, SOLUTION PARENTERAL at 09:09

## 2024-09-16 RX ADMIN — SODIUM CHLORIDE, SODIUM GLUCONATE, SODIUM ACETATE, POTASSIUM CHLORIDE, MAGNESIUM CHLORIDE, SODIUM PHOSPHATE, DIBASIC, AND POTASSIUM PHOSPHATE: .53; .5; .37; .037; .03; .012; .00082 INJECTION, SOLUTION INTRAVENOUS at 12:09

## 2024-09-16 RX ADMIN — SUGAMMADEX 200 MG: 100 INJECTION, SOLUTION INTRAVENOUS at 06:09

## 2024-09-16 RX ADMIN — FENTANYL CITRATE 50 MCG: 50 INJECTION INTRAMUSCULAR; INTRAVENOUS at 04:09

## 2024-09-16 RX ADMIN — HYDROMORPHONE HYDROCHLORIDE 0.2 MG: 1 INJECTION, SOLUTION INTRAMUSCULAR; INTRAVENOUS; SUBCUTANEOUS at 07:09

## 2024-09-16 RX ADMIN — ACETAMINOPHEN 1000 MG: 10 INJECTION INTRAVENOUS at 05:09

## 2024-09-16 RX ADMIN — VANCOMYCIN HYDROCHLORIDE 1 G: 1 INJECTION, POWDER, LYOPHILIZED, FOR SOLUTION INTRAVENOUS at 01:09

## 2024-09-16 RX ADMIN — GABAPENTIN 300 MG: 300 CAPSULE ORAL at 09:09

## 2024-09-16 RX ADMIN — SODIUM CHLORIDE: 0.9 INJECTION, SOLUTION INTRAVENOUS at 11:09

## 2024-09-16 RX ADMIN — SODIUM CHLORIDE: 9 INJECTION, SOLUTION INTRAVENOUS at 09:09

## 2024-09-16 RX ADMIN — ALVIMOPAN 12 MG: 12 CAPSULE ORAL at 08:09

## 2024-09-16 RX ADMIN — GENTAMICIN SULFATE 240 MG: 40 INJECTION, SOLUTION INTRAMUSCULAR; INTRAVENOUS at 01:09

## 2024-09-16 RX ADMIN — LABETALOL HYDROCHLORIDE 10 MG: 5 INJECTION, SOLUTION INTRAVENOUS at 07:09

## 2024-09-16 RX ADMIN — ROCURONIUM BROMIDE 30 MG: 10 INJECTION, SOLUTION INTRAVENOUS at 04:09

## 2024-09-16 RX ADMIN — ROCURONIUM BROMIDE 70 MG: 10 INJECTION, SOLUTION INTRAVENOUS at 12:09

## 2024-09-16 RX ADMIN — OXYCODONE HYDROCHLORIDE 10 MG: 10 TABLET ORAL at 07:09

## 2024-09-16 RX ADMIN — Medication 25 MG: at 04:09

## 2024-09-16 RX ADMIN — Medication: at 07:09

## 2024-09-16 RX ADMIN — FAMOTIDINE 20 MG: 10 INJECTION, SOLUTION INTRAVENOUS at 08:09

## 2024-09-16 RX ADMIN — DEXMEDETOMIDINE 8 MCG: 100 INJECTION, SOLUTION, CONCENTRATE INTRAVENOUS at 06:09

## 2024-09-16 RX ADMIN — Medication 25 MG: at 05:09

## 2024-09-16 RX ADMIN — PHENYLEPHRINE HYDROCHLORIDE 100 MCG: 10 INJECTION INTRAVENOUS at 06:09

## 2024-09-16 RX ADMIN — LABETALOL HYDROCHLORIDE 10 MG: 5 INJECTION, SOLUTION INTRAVENOUS at 08:09

## 2024-09-16 RX ADMIN — PROPOFOL 170 MG: 10 INJECTION, EMULSION INTRAVENOUS at 12:09

## 2024-09-16 RX ADMIN — Medication 300 MCG: at 12:09

## 2024-09-16 RX ADMIN — DEXAMETHASONE SODIUM PHOSPHATE 4 MG: 4 INJECTION INTRA-ARTICULAR; INTRALESIONAL; INTRAMUSCULAR; INTRAVENOUS; SOFT TISSUE at 01:09

## 2024-09-16 RX ADMIN — SODIUM CHLORIDE: 0.9 INJECTION, SOLUTION INTRAVENOUS at 05:09

## 2024-09-16 RX ADMIN — Medication 100 MCG: at 02:09

## 2024-09-16 RX ADMIN — HEPARIN SODIUM 5000 UNITS: 5000 INJECTION INTRAVENOUS; SUBCUTANEOUS at 09:09

## 2024-09-16 RX ADMIN — FENTANYL CITRATE 50 MCG: 50 INJECTION INTRAMUSCULAR; INTRAVENOUS at 12:09

## 2024-09-16 NOTE — TRANSFER OF CARE
"Anesthesia Transfer of Care Note    Patient: Rhett Johnson    Procedure(s) Performed: Procedure(s) (LRB):  CREATION, ILEAL CONDUIT (N/A)  CYSTECTOMY, RADICAL (N/A)    Patient location: PACU    Anesthesia Type: general    Transport from OR: Transported from OR on 6-10 L/min O2 by face mask with adequate spontaneous ventilation    Post pain: adequate analgesia    Post assessment: no apparent anesthetic complications and tolerated procedure well    Post vital signs: stable    Level of consciousness: sedated    Nausea/Vomiting: no nausea/vomiting    Complications: none    Transfer of care protocol was followed      Last vitals: Visit Vitals  BP (!) 143/56 (BP Location: Left arm, Patient Position: Lying)   Pulse 68   Temp 36.1 °C (97 °F) (Temporal)   Resp 16   Ht 5' 10" (1.778 m)   Wt 77.1 kg (170 lb)   SpO2 100%   BMI 24.39 kg/m²     "

## 2024-09-16 NOTE — ANESTHESIA PROCEDURE NOTES
Bilateral CARLO Catheter    Patient location during procedure: pre-op   Block not for primary anesthetic.  Reason for block: at surgeon's request and post-op pain management   Post-op Pain Location: Lower abdominal pain   Start time: 9/16/2024 10:21 AM  Timeout: 9/16/2024 10:20 AM   End time: 9/16/2024 10:50 AM    Staffing  Authorizing Provider: Monty Dobson MD  Performing Provider: Chen Mosqueda MD    Staffing  Performed by: Chen Mosqueda MD  Authorized by: Monty Dobson MD    Preanesthetic Checklist  Completed: patient identified, IV checked, site marked, risks and benefits discussed, surgical consent, monitors and equipment checked, pre-op evaluation and timeout performed  Peripheral Block  Patient position: sitting  Prep: ChloraPrep  Patient monitoring: heart rate, cardiac monitor, continuous pulse ox, continuous capnometry and frequent blood pressure checks  Block type: erector spinae plane (Erector Spinae Plane)  Laterality: bilateral  Injection technique: continuous  Interspace: T7-8    Needle  Needle type: Tuohy   Needle gauge: 17 G  Needle length: 3.5 in  Needle localization: anatomical landmarks and ultrasound guidance  Catheter type: spring wound  Catheter size: 19 G  Test dose: lidocaine 1.5% with Epi 1-to-200,000 and negative   -ultrasound image captured on disc.  Assessment  Injection assessment: negative aspiration, negative parasthesia and local visualized surrounding nerve  Paresthesia pain: none  Heart rate change: no  Slow fractionated injection: yes  Pain Tolerance: comfortable throughout block  Medications:    Medications: bupivacaine (pf) (MARCAINE) injection 0.75% - Perineural, Other   30 mL - 9/16/2024 10:50:00 AM    Additional Notes  Patient tolerated very well.  Vital signs stable.  See [facility] RN charting for vital signs. Administered 30cc of 0.375% bupivacaine with 1:300k epi, 10mg dexamethasone, and 50mcg clonidine as local anesthetic bilaterally prior to insertion of  catheters.

## 2024-09-16 NOTE — ANESTHESIA PROCEDURE NOTES
Intubation    Date/Time: 9/16/2024 12:26 PM    Performed by: Magaly Arana MD  Authorized by: Lianna Obrien MD    Intubation:     Induction:  Intravenous    Intubated:  Postinduction    Mask Ventilation:  Easy with oral airway    Attempts:  1    Attempted By:  Resident anesthesiologist    Method of Intubation:  Direct    Blade:  Florian 3    Laryngeal View Grade: Grade I - full view of cords      Difficult Airway Encountered?: No      Complications:  None    Airway Device:  Oral endotracheal tube    Airway Device Size:  7.5    Style/Cuff Inflation:  Cuffed    Tube secured:  23    Secured at:  The lips    Placement Verified By:  Capnometry    Complicating Factors:  None    Findings Post-Intubation:  BS equal bilateral

## 2024-09-16 NOTE — ANESTHESIA POSTPROCEDURE EVALUATION
Anesthesia Post Evaluation    Patient: Rhett Johnson    Procedure(s) Performed: Procedure(s) (LRB):  CREATION, ILEAL CONDUIT (N/A)  CYSTECTOMY, RADICAL (N/A)    Final Anesthesia Type: general      Patient location during evaluation: PACU  Patient participation: Yes- Able to Participate  Level of consciousness: awake and alert  Post-procedure vital signs: reviewed and stable  Pain management: adequate  Airway patency: patent    PONV status at discharge: No PONV  Anesthetic complications: no      Cardiovascular status: hemodynamically stable  Respiratory status: spontaneous ventilation  Follow-up not needed.              Vitals Value Taken Time   /84 09/16/24 1848   Temp 98.0 09/16/24 1852   Pulse 76 09/16/24 1852   Resp 11 09/16/24 1852   SpO2 100 % 09/16/24 1852   Vitals shown include unfiled device data.      No case tracking events are documented in the log.      Pain/Nakul Score: Pain Rating Prior to Med Admin: 0 (9/16/2024 10:25 AM)  Pain Rating Post Med Admin: 0 (9/16/2024 10:25 AM)  Nakul Score: 10 (9/16/2024 12:05 PM)

## 2024-09-16 NOTE — BRIEF OP NOTE
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    Post-Op Diagnosis: same    Procedure(s) Performed:   Radical cystectomy with bilateral pelvic node dissection and ileal conduit.    Specimen(s): right and left nodes, right and left distal ureters for frozen, bladder.    Staff Surgeon: LIBORIO Salguero MD    Assistant Surgeon: EBEN Rojo MD    Anesthesia: General endotracheal anesthesia    Indications: Rhett Johnson is a 73 y.o. male with muscle invasive bladder cancer. Prior radical prostatectomy.    Findings: above    Estimated Blood Loss: 300 ccs    Drains: anatoliy drain 19 fr.    Wound Class: contaminated.

## 2024-09-16 NOTE — ANESTHESIA PROCEDURE NOTES
Left Radial Arterial Line    Diagnosis: Hydroureteronerphrosis  Doctor requesting consult: Hari Salguero MD    Patient location during procedure: done in OR  Procedure end time: 9/16/2024 12:51 PM    Staffing  Authorizing Provider: Donny Salvador MD  Performing Provider: Donny Salvador MD    Staffing  Other anesthesia staff: Richie Christina MD  Performed by: Donny Salvador MD  Authorized by: Donny Salvador MD    Anesthesiologist was present at the time of the procedure.    Preanesthetic Checklist  Completed: patient identified, IV checked, risks and benefits discussed, surgical consent, monitors and equipment checked, pre-op evaluation, timeout performed and anesthesia consent givenLeft Radial Arterial Line  Skin Prep: chlorhexidine gluconate  Local Infiltration: none  Orientation: left  Location: radial    Catheter Size: 20 G  Catheter placement by Ultrasound guidance. Heme positive aspiration all ports.   Vessel Caliber: small, patent  Needle advanced into vessel with real time Ultrasound guidance.Insertion Attempts: 3  Assessment  Dressing: secured with tape and tegaderm  Patient: Tolerated well  Additional Notes  2 previous attempts on right radial by different providers. Able to access vessel but unable to thread guidewire.   Final attempt in left radial using ultrasound guidance successful without complication.

## 2024-09-16 NOTE — OP NOTE
[facility]   Operative Note     Date: 09/16/2024    Pre-Op Diagnosis: [id number] urothelial cell carcinoma of the bladder    Post-Op Diagnosis: same     Procedure(s) Performed:   1.  Extended bilateral pelvic lymph node dissection  2.  Open Radical cystectomy  3.  Creation of ileal conduit    Specimen(s):   1.  Left pelvic lymph nodes  2.  Right pelvic lymph nodes  3.  Left distal ureter  4.  Right distal ureter  5.  Bladder    Staff Surgeon: Hari Salguero MD     Assistant Surgeon: Elida Rojo MD    Anesthesia: General endotracheal anesthesia     Indications: Rhett Johnson is a 73 y.o. male with [id number] urothelial cell carcinoma of the bladder.  He did notreceive neoadjuvant chemotherapy due to his CKD. He has a history of radical prostatectomy in 2001.      Findings:   - Radical cystectomy with ileal conduit creation performed without immediate intra-operative complication.  - Left bladder wall was adherent to the left pelvic side wall making dissection difficult, a cystotomy was made here.   - Previously placed left ureteral stent was removed during the case.  - There was minimal pelvic lymph node tissue, likely due to previous pelvic lymph node dissection.    Estimated Blood Loss: 300 mL    Drains:   1.  19 mm Florentin drain to LLQ  2.  L J ureteral stent via conduit  3.  R J ureteral stent via conduit    Procedure in Detail:  After risks and benefits of the procedure were discussed with the patient, informed consent was obtained.  The patient received heparin and Entereg preoperatively, as well as completed his antibiotic bowel prep and Hibiclens shower.  He was brought to the operating suite and placed in the supine position.  General anesthesia was administered.  The stoma site previously marked by wound care was etched into the patient's skin for identification during stoma creation.  The patient was then prepped and draped int he usual sterile fashion.  A 16 Fr merlos catheter was  placed in the standard fashion on the field.      A midline incision was marked from umbilicus to pubis.  This was incised sharply using a 10 blade.  Bovie electrocautery was used to dissect down through the subcutaneous tissues until the anterior rectus sheath was cleared.  The fascia was opened int he lower midline using the Bovie.  The rectus muscle was split in the midline and the retropubic space was entered.  The fascial incision was extended along the length of our skin incision.  The bladder was mobilized from the anterior abdominal wall bluntly.  Bilateral pelvic and retroperitoneal tunnels were created bluntly to further mobilize the bladder.  On each side the vas deferens was isolated and taken with two clips. The bookwalter was assembled and retractors were placed.     The left ureter was then isolated with a vessel loop.  It was mobilized proximally and distally. The obliterated umbilical artery identified and divided.  The lateral bladder pedicle was taken bluntly using the [place] Ligasure for large vessels as they were encountered.  The endopelvic fascia on the left was then entered.     The left pelvic lymph node dissection was then performed by skeletonizing the common iliac, internal, and external iliac arteries as well as the external iliac vein using the genitofemoral nerve as the extent of our lateral dissection. Lymphatics were controlled with metal clips.      The exact same steps were then taken on the contralateral (right) side to perform our right pelvic lymph node dissection and control the lateral bladder pedicle.      The urethra was fully mobilized, isolated and divided anteriorly.  The merlos catheter was delivered.  The posterior urethra was then divided sharply carefully avoiding rectal injury.    The superior aspect of the bladder was then addressed.  The urachus was divided using the Bovie and the remaining lateral peritoneal attachments were freed.      The ureters were  identified again bilaterally.  The posterior bladder pedicle was then isolated and amputated.      The ureters were then further dissect free distally to the level of the bladder.  The left ureter was clipped at the level of the bladder and amputated.  A small piece of distal ureter was sent for frozen section, which confirmed no malignancy.  This was repeated on the right side, again frozen section confirming no malignancy.      A tunnel was bluntly created behind the sigmoid colon just anterior to the bifurcation of the iliac vessels. The left ureter was then passed through this tunnel to the right for creation of our conduit.      The pelvis was irrigated with bacitracin and excellent hemostasis was achieved.  The rectum was identified and uninjured.      The terminal ileum was identified and examined. The mesentery was then examined by transillumination and the arterial arcades were identified. The distal segment was selected 15 cm from the ileocecal valve. Bovie electrocautery was used to create mesenteric windows around the vascular arcades. The ligasure device was used to divide the mesenteric vessels. A ADINA 75 stapler was then used to divide the distal end of the bowel anastomosis.     The mesentery was again transilluminated and the vascular arcade was located. The site was selected for the proximal incision. The length of conduit was measured to be 20 cm. Again the mesenteric vessels were ligated with Ligasure. The ADINA 75 stapler was used to come across the bowel at this point. The staple line was excluded using 3-0 vicryl suture in a running horizontal fashion. The conduit and its mesentery were positioned below the bowel that would be rejoined.     Attention was placed to the two cut ends of the ileum. The antimesenteric ends were cut and a staple technique using the 75 ADINA stapler was used to reestablish bowel continuity. Three 3-0 vicryl sutures were placed at the junction to take the tension off of  this area. After placing intestinal Allis clamps, taking care not to overlap the previous staples lines, the ADINA 75 stapler was used to close the top of the anastamosis. The mesenteric window was then closed with 3-0 vicryl ties to prevent internal bowel herniation.     Construction of the stoma then began with the excision of a circular plug of skin around the preselected stoma site on the patient's right. Dissection was carried down to the anterior rectus fascia and a cruciate incision was made in this layer. 2-0 vicryls were used to tag each of the cruciate flaps and for future anchoring of the conduit.      A Wild was then passed bluntly through the rectus muscle after palpating for and avoiding the epigastric artery. The Wild was then spread to create a hiatus that allowed for passage of two fingers, which was adequate for the conduit to pass.  The conduit was then grasped and brought through the stoma site. The 2-0 vicryl sutures that had been placed through the fascia were brought through the serosal layer of the conduit to secure it to the abdominal wall.     A rosebud stoma was created using 2-0 vicryl sutures. The mucosal edges of the ileum were secured to the skin with interrupted 3-0 vicryl sutures.     We next performed the ileal ureteral anastomosis beginning with the left ureter. A segment of ileum was incised with bovie electrocautery until the mucosal layer could be seen. The mucosa was then incised. The distal ureter was cut with tenotomy scissors leaving a small segment as a handle. The ureter was then spatulated with King scissors to ensure an adequate size for the anastamosis.  Two 3-0 monocryls were used to anchor the crotch of the spatulation to the enterotomy.  These were then used in a running fashion to allow mucosa-to-mucosa apposition of the ureter and conduit.  Prior to closing the anastomosis, a blue J stent was placed with the assistance of a Serometrix suction tip in the standard  fashion. The anastomosis was then completed. The stomal end was irrigated with a bulb syringe with no evidence of leak.     This procedure was then repeated with the right ureter, using a red single J stent on that side.     A 19 mm anatoliy drain was then placed in the left lower quadrant into the pelvis.  The abdomen was again irrigated. Hemostasis was visualized. The bowels were placed in their normal anatomic position.     The fascia was then closed using 0 looped PDS. The subcutaneous tissues were closed in two layers using 3-0 vicryl and the overlying skin was closed using 4-0 Monocryl in a running subcuticular fashion.     The drain was secured with a 2-0 nylon suture. The ureteral stents were secured with a 2-0 Nylon suture.  A urostomy appliance was placed over the stoma.     Disposition:  The patient will be admitted to the Urology service for close observation and post operative recovery.    Elida Rojo MD

## 2024-09-17 LAB
ANION GAP SERPL CALC-SCNC: 11 MMOL/L (ref 8–16)
ANION GAP SERPL CALC-SCNC: 12 MMOL/L (ref 8–16)
ANION GAP SERPL CALC-SCNC: 8 MMOL/L (ref 8–16)
ANION GAP SERPL CALC-SCNC: 8 MMOL/L (ref 8–16)
BASOPHILS # BLD AUTO: 0.05 K/UL (ref 0–0.2)
BASOPHILS NFR BLD: 0.4 % (ref 0–1.9)
BUN SERPL-MCNC: 16 MG/DL (ref 8–23)
BUN SERPL-MCNC: 16 MG/DL (ref 8–23)
BUN SERPL-MCNC: 17 MG/DL (ref 8–23)
BUN SERPL-MCNC: 18 MG/DL (ref 8–23)
CALCIUM SERPL-MCNC: 8.5 MG/DL (ref 8.7–10.5)
CALCIUM SERPL-MCNC: 8.7 MG/DL (ref 8.7–10.5)
CALCIUM SERPL-MCNC: 8.7 MG/DL (ref 8.7–10.5)
CALCIUM SERPL-MCNC: 8.8 MG/DL (ref 8.7–10.5)
CALCIUM SERPL-MCNC: 8.8 MG/DL (ref 8.7–10.5)
CALCIUM SERPL-MCNC: 8.9 MG/DL (ref 8.7–10.5)
CHLORIDE SERPL-SCNC: 103 MMOL/L (ref 95–110)
CHLORIDE SERPL-SCNC: 104 MMOL/L (ref 95–110)
CHLORIDE SERPL-SCNC: 105 MMOL/L (ref 95–110)
CHLORIDE SERPL-SCNC: 105 MMOL/L (ref 95–110)
CO2 SERPL-SCNC: 16 MMOL/L (ref 23–29)
CO2 SERPL-SCNC: 18 MMOL/L (ref 23–29)
CO2 SERPL-SCNC: 19 MMOL/L (ref 23–29)
CO2 SERPL-SCNC: 19 MMOL/L (ref 23–29)
CREAT SERPL-MCNC: 1.5 MG/DL (ref 0.5–1.4)
CREAT SERPL-MCNC: 1.6 MG/DL (ref 0.5–1.4)
CREAT SERPL-MCNC: 1.8 MG/DL (ref 0.5–1.4)
DIFFERENTIAL METHOD BLD: ABNORMAL
EOSINOPHIL # BLD AUTO: 0 K/UL (ref 0–0.5)
EOSINOPHIL NFR BLD: 0 % (ref 0–8)
ERYTHROCYTE [DISTWIDTH] IN BLOOD BY AUTOMATED COUNT: 12.8 % (ref 11.5–14.5)
EST. GFR  (NO RACE VARIABLE): 39.5 ML/MIN/1.73 M^2
EST. GFR  (NO RACE VARIABLE): 45.5 ML/MIN/1.73 M^2
EST. GFR  (NO RACE VARIABLE): 49.2 ML/MIN/1.73 M^2
GLUCOSE SERPL-MCNC: 100 MG/DL (ref 70–110)
GLUCOSE SERPL-MCNC: 107 MG/DL (ref 70–110)
GLUCOSE SERPL-MCNC: 113 MG/DL (ref 70–110)
GLUCOSE SERPL-MCNC: 124 MG/DL (ref 70–110)
GLUCOSE SERPL-MCNC: 126 MG/DL (ref 70–110)
GLUCOSE SERPL-MCNC: 126 MG/DL (ref 70–110)
GLUCOSE SERPL-MCNC: 253 MG/DL (ref 70–110)
GLUCOSE SERPL-MCNC: 90 MG/DL (ref 70–110)
HCO3 UR-SCNC: 18.6 MMOL/L (ref 24–28)
HCO3 UR-SCNC: 19.3 MMOL/L (ref 24–28)
HCT VFR BLD AUTO: 28.9 % (ref 40–54)
HCT VFR BLD CALC: 27 %PCV (ref 36–54)
HCT VFR BLD CALC: 27 %PCV (ref 36–54)
HGB BLD-MCNC: 8.6 G/DL (ref 14–18)
IMM GRANULOCYTES # BLD AUTO: 0.07 K/UL (ref 0–0.04)
IMM GRANULOCYTES NFR BLD AUTO: 0.5 % (ref 0–0.5)
LYMPHOCYTES # BLD AUTO: 1 K/UL (ref 1–4.8)
LYMPHOCYTES NFR BLD: 7.7 % (ref 18–48)
MAGNESIUM SERPL-MCNC: 1.8 MG/DL (ref 1.6–2.6)
MCH RBC QN AUTO: 29.1 PG (ref 27–31)
MCHC RBC AUTO-ENTMCNC: 29.8 G/DL (ref 32–36)
MCV RBC AUTO: 98 FL (ref 82–98)
MONOCYTES # BLD AUTO: 1.2 K/UL (ref 0.3–1)
MONOCYTES NFR BLD: 9.3 % (ref 4–15)
NEUTROPHILS # BLD AUTO: 10.8 K/UL (ref 1.8–7.7)
NEUTROPHILS NFR BLD: 82.1 % (ref 38–73)
NRBC BLD-RTO: 0 /100 WBC
PCO2 BLDA: 36 MMHG (ref 35–45)
PCO2 BLDA: 36.8 MMHG (ref 35–45)
PH SMN: 7.32 [PH] (ref 7.35–7.45)
PH SMN: 7.33 [PH] (ref 7.35–7.45)
PHOSPHATE SERPL-MCNC: 3.4 MG/DL (ref 2.7–4.5)
PLATELET # BLD AUTO: 265 K/UL (ref 150–450)
PMV BLD AUTO: 10.2 FL (ref 9.2–12.9)
PO2 BLDA: 134 MMHG (ref 80–100)
PO2 BLDA: 93 MMHG (ref 80–100)
POC BE: -7 MMOL/L
POC BE: -7 MMOL/L
POC IONIZED CALCIUM: 1.1 MMOL/L (ref 1.06–1.42)
POC IONIZED CALCIUM: 1.11 MMOL/L (ref 1.06–1.42)
POC SATURATED O2: 97 % (ref 95–100)
POC SATURATED O2: 99 % (ref 95–100)
POC TCO2: 20 MMOL/L (ref 23–27)
POC TCO2: 20 MMOL/L (ref 23–27)
POCT GLUCOSE: 136 MG/DL (ref 70–110)
POCT GLUCOSE: 219 MG/DL (ref 70–110)
POCT GLUCOSE: 276 MG/DL (ref 70–110)
POCT GLUCOSE: 92 MG/DL (ref 70–110)
POCT GLUCOSE: 99 MG/DL (ref 70–110)
POTASSIUM BLD-SCNC: 4.3 MMOL/L (ref 3.5–5.1)
POTASSIUM BLD-SCNC: 4.6 MMOL/L (ref 3.5–5.1)
POTASSIUM SERPL-SCNC: 4.3 MMOL/L (ref 3.5–5.1)
POTASSIUM SERPL-SCNC: 4.7 MMOL/L (ref 3.5–5.1)
POTASSIUM SERPL-SCNC: 4.9 MMOL/L (ref 3.5–5.1)
POTASSIUM SERPL-SCNC: 5.9 MMOL/L (ref 3.5–5.1)
RBC # BLD AUTO: 2.96 M/UL (ref 4.6–6.2)
SAMPLE: ABNORMAL
SAMPLE: ABNORMAL
SODIUM BLD-SCNC: 138 MMOL/L (ref 136–145)
SODIUM BLD-SCNC: 139 MMOL/L (ref 136–145)
SODIUM SERPL-SCNC: 131 MMOL/L (ref 136–145)
SODIUM SERPL-SCNC: 132 MMOL/L (ref 136–145)
SODIUM SERPL-SCNC: 133 MMOL/L (ref 136–145)
WBC # BLD AUTO: 13.18 K/UL (ref 3.9–12.7)

## 2024-09-17 PROCEDURE — 97162 PT EVAL MOD COMPLEX 30 MIN: CPT

## 2024-09-17 PROCEDURE — 25000003 PHARM REV CODE 250: Performed by: UROLOGY

## 2024-09-17 PROCEDURE — 36415 COLL VENOUS BLD VENIPUNCTURE: CPT | Performed by: STUDENT IN AN ORGANIZED HEALTH CARE EDUCATION/TRAINING PROGRAM

## 2024-09-17 PROCEDURE — 80048 BASIC METABOLIC PNL TOTAL CA: CPT | Performed by: STUDENT IN AN ORGANIZED HEALTH CARE EDUCATION/TRAINING PROGRAM

## 2024-09-17 PROCEDURE — 25000003 PHARM REV CODE 250

## 2024-09-17 PROCEDURE — 97530 THERAPEUTIC ACTIVITIES: CPT

## 2024-09-17 PROCEDURE — 84100 ASSAY OF PHOSPHORUS: CPT | Performed by: STUDENT IN AN ORGANIZED HEALTH CARE EDUCATION/TRAINING PROGRAM

## 2024-09-17 PROCEDURE — 80048 BASIC METABOLIC PNL TOTAL CA: CPT | Mod: 91

## 2024-09-17 PROCEDURE — 20600001 HC STEP DOWN PRIVATE ROOM

## 2024-09-17 PROCEDURE — 83735 ASSAY OF MAGNESIUM: CPT | Performed by: STUDENT IN AN ORGANIZED HEALTH CARE EDUCATION/TRAINING PROGRAM

## 2024-09-17 PROCEDURE — 25000003 PHARM REV CODE 250: Performed by: STUDENT IN AN ORGANIZED HEALTH CARE EDUCATION/TRAINING PROGRAM

## 2024-09-17 PROCEDURE — 97165 OT EVAL LOW COMPLEX 30 MIN: CPT

## 2024-09-17 PROCEDURE — 85025 COMPLETE CBC W/AUTO DIFF WBC: CPT | Performed by: STUDENT IN AN ORGANIZED HEALTH CARE EDUCATION/TRAINING PROGRAM

## 2024-09-17 PROCEDURE — C1751 CATH, INF, PER/CENT/MIDLINE: HCPCS

## 2024-09-17 PROCEDURE — 99231 SBSQ HOSP IP/OBS SF/LOW 25: CPT | Mod: ,,, | Performed by: ANESTHESIOLOGY

## 2024-09-17 PROCEDURE — 63600175 PHARM REV CODE 636 W HCPCS: Performed by: STUDENT IN AN ORGANIZED HEALTH CARE EDUCATION/TRAINING PROGRAM

## 2024-09-17 PROCEDURE — A4216 STERILE WATER/SALINE, 10 ML: HCPCS | Performed by: UROLOGY

## 2024-09-17 PROCEDURE — 36415 COLL VENOUS BLD VENIPUNCTURE: CPT

## 2024-09-17 PROCEDURE — 76937 US GUIDE VASCULAR ACCESS: CPT

## 2024-09-17 PROCEDURE — 36410 VNPNXR 3YR/> PHY/QHP DX/THER: CPT

## 2024-09-17 RX ORDER — SODIUM CHLORIDE 0.9 % (FLUSH) 0.9 %
10 SYRINGE (ML) INJECTION EVERY 6 HOURS
Status: DISCONTINUED | OUTPATIENT
Start: 2024-09-17 | End: 2024-09-20 | Stop reason: HOSPADM

## 2024-09-17 RX ORDER — CYCLOBENZAPRINE HCL 5 MG
10 TABLET ORAL EVERY 8 HOURS PRN
Status: DISCONTINUED | OUTPATIENT
Start: 2024-09-17 | End: 2024-09-18

## 2024-09-17 RX ORDER — FAMOTIDINE 10 MG/ML
20 INJECTION INTRAVENOUS DAILY
Status: DISCONTINUED | OUTPATIENT
Start: 2024-09-18 | End: 2024-09-20 | Stop reason: HOSPADM

## 2024-09-17 RX ORDER — SODIUM CHLORIDE 0.9 % (FLUSH) 0.9 %
10 SYRINGE (ML) INJECTION
Status: DISCONTINUED | OUTPATIENT
Start: 2024-09-17 | End: 2024-09-20 | Stop reason: HOSPADM

## 2024-09-17 RX ORDER — HYDROMORPHONE HYDROCHLORIDE 1 MG/ML
0.2 INJECTION, SOLUTION INTRAMUSCULAR; INTRAVENOUS; SUBCUTANEOUS
Status: DISCONTINUED | OUTPATIENT
Start: 2024-09-17 | End: 2024-09-17

## 2024-09-17 RX ORDER — AMOXICILLIN 250 MG
1 CAPSULE ORAL DAILY PRN
Status: DISCONTINUED | OUTPATIENT
Start: 2024-09-17 | End: 2024-09-20 | Stop reason: HOSPADM

## 2024-09-17 RX ADMIN — ATORVASTATIN CALCIUM 80 MG: 40 TABLET, FILM COATED ORAL at 09:09

## 2024-09-17 RX ADMIN — HEPARIN SODIUM 5000 UNITS: 5000 INJECTION INTRAVENOUS; SUBCUTANEOUS at 01:09

## 2024-09-17 RX ADMIN — SODIUM CHLORIDE, POTASSIUM CHLORIDE, SODIUM LACTATE AND CALCIUM CHLORIDE: 600; 310; 30; 20 INJECTION, SOLUTION INTRAVENOUS at 07:09

## 2024-09-17 RX ADMIN — HEPARIN SODIUM 5000 UNITS: 5000 INJECTION INTRAVENOUS; SUBCUTANEOUS at 09:09

## 2024-09-17 RX ADMIN — ACETAMINOPHEN 650 MG: 325 TABLET ORAL at 05:09

## 2024-09-17 RX ADMIN — CYCLOBENZAPRINE HYDROCHLORIDE 10 MG: 5 TABLET, FILM COATED ORAL at 04:09

## 2024-09-17 RX ADMIN — Medication 10 ML: at 05:09

## 2024-09-17 RX ADMIN — FAMOTIDINE 20 MG: 10 INJECTION, SOLUTION INTRAVENOUS at 09:09

## 2024-09-17 RX ADMIN — ACETAMINOPHEN 650 MG: 325 TABLET ORAL at 01:09

## 2024-09-17 RX ADMIN — ACETAMINOPHEN 650 MG: 325 TABLET ORAL at 12:09

## 2024-09-17 RX ADMIN — HYDROMORPHONE HYDROCHLORIDE 1 MG: 1 INJECTION, SOLUTION INTRAMUSCULAR; INTRAVENOUS; SUBCUTANEOUS at 04:09

## 2024-09-17 RX ADMIN — ALVIMOPAN 12 MG: 12 CAPSULE ORAL at 09:09

## 2024-09-17 RX ADMIN — HEPARIN SODIUM 5000 UNITS: 5000 INJECTION INTRAVENOUS; SUBCUTANEOUS at 04:09

## 2024-09-17 RX ADMIN — GABAPENTIN 300 MG: 300 CAPSULE ORAL at 04:09

## 2024-09-17 RX ADMIN — GABAPENTIN 300 MG: 300 CAPSULE ORAL at 01:09

## 2024-09-17 RX ADMIN — SODIUM CHLORIDE, POTASSIUM CHLORIDE, SODIUM LACTATE AND CALCIUM CHLORIDE: 600; 310; 30; 20 INJECTION, SOLUTION INTRAVENOUS at 05:09

## 2024-09-17 RX ADMIN — CARVEDILOL 12.5 MG: 12.5 TABLET, FILM COATED ORAL at 09:09

## 2024-09-17 RX ADMIN — GABAPENTIN 300 MG: 300 CAPSULE ORAL at 09:09

## 2024-09-17 RX ADMIN — POLYETHYLENE GLYCOL 3350 17 G: 17 POWDER, FOR SOLUTION ORAL at 09:09

## 2024-09-17 NOTE — PT/OT/SLP EVAL
Occupational Therapy   Evaluation    Name: Rhett Johnson  MRN: [id number]  Admitting Diagnosis: <principal problem not specified>  Recent Surgery: Procedure(s) (LRB):  CREATION, ILEAL CONDUIT (N/A)  CYSTECTOMY, RADICAL (N/A) 1 Day Post-Op    Recommendations:     Discharge Recommendations: Low Intensity Therapy  Discharge Equipment Recommendations:  none  Barriers to discharge:  None    Assessment:     Rhett Johnson is a 73 y.o. male with a medical diagnosis of <principal problem not specified>.  He presents with the following performance deficits affecting function: weakness, pain, impaired self care skills, impaired functional mobility, decreased lower extremity function.      Pt agreeable to therapy and tolerated fairly. However, due to abdominal pain, pt remains limited in ADLs, functional mobility, and functional transfers and is currently not performing tasks at PLOF. Pt would continue to benefit from skilled OT services to maximize functional independence with ADLs and functional mobility, reduce caregiver burden, and facilitate safe discharge in the least restrictive environment.      Rehab Prognosis: Good; patient would benefit from acute skilled OT services to address these deficits and reach maximum level of function.       Plan:     Patient to be seen 3 x/week to address the above listed problems via self-care/home management, therapeutic activities, therapeutic exercises  Plan of Care Expires: 10/17/24  Plan of Care Reviewed with: patient    Subjective     Chief Complaint: abdominal pain  Patient/Family Comments/goals: regain PLOF    Occupational Profile:  Living Environment: pt lives with spouse and grand children in a [facility] with 1 YOLY. Bathroom:tub/shower combo with grab bars  Previous level of function: Independent  Roles and Routines: pt enjoys watching TV & sports  Equipment Used at Home: wheelchair, bedside commode, rollator, walker, rolling (Sock aid & reacher)  Assistance upon Discharge:  family    Pain/Comfort:  Pain Rating 1: 0/10    Patients cultural, spiritual, Buddhism conflicts given the current situation: no    Objective:     Communicated with: RN prior to session.  Patient found up in chair with peripheral IV, perineural catheter, IVANIA drain (urostomy) upon OT entry to room.    General Precautions: Standard, fall  Orthopedic Precautions: N/A  Braces: N/A  Respiratory Status: Room air    Occupational Performance:    Bed Mobility:    Found in chair    Functional Mobility/Transfers:  Deferred due to abdominal pain    Activities of Daily Living:  Lower Body Dressing: supervision to dof sock while seated in chair via figure 4 technique  Total assistance to don sock while seated in chair    Cognitive/Visual Perceptual:  Cognitive/Psychosocial Skills:     -       Follows Commands/attention:Follows multistep  commands    Physical Exam:  Dominant hand: -       Right  Upper Extremity Range of Motion:     -       Right Upper Extremity: WFL  -       Left Upper Extremity: WFL  Upper Extremity Strength:    -       Right Upper Extremity: WFL  -       Left Upper Extremity: WFL   Strength:    -       Right Upper Extremity: WFL  -       Left Upper Extremity: WFL  Fine Motor Coordination:    -       Intact  Left hand thumb/finger opposition skills and Right hand thumb/finger opposition skills  Gross motor coordination:   WFL    AMPAC 6 Click ADL:  AMPAC Total Score: 20    Treatment & Education:  -Education on the benefits of using adaptive equipment to promote independence with LBD while minimizing pain  -Education on importance of OOB activity to improve overall activity tolerance and promote recovery  -Pt educated to call for assistance and to transfer with hospital staff only  -Provided education regarding role of OT & POC with pt verbalizing understanding. Pt had no further questions & when asked whether there were any concerns pt reported none.     Patient left up in chair with all lines intact, call  button in reach, and student nurse & preceptor present    GOALS:   Multidisciplinary Problems       Occupational Therapy Goals          Problem: Occupational Therapy    Goal Priority Disciplines Outcome Interventions   Occupational Therapy Goal     OT, PT/OT Progressing    Description: Goals to be met by: 10/17/2024     Patient will increase functional independence with ADLs by performing:    UE Dressing with Modified Floyd.  LE Dressing with Modified Floyd.  Grooming while standing at sink with Set-up Assistance.  Toileting from toilet with Modified Floyd for hygiene and clothing management.   Supine to sit with Modified Floyd.  Step transfer with Modified Floyd  Toilet transfer to toilet with Modified Floyd.                         History:     Past Medical History:   Diagnosis Date    Anemia 09/09/2024    Asthma     Cancer     Carotid occlusion, left     100% blockage    COPD (chronic obstructive pulmonary disease)     Current tobacco use 08/20/2018    Deep vein thrombosis     Disorder of kidney and ureter     DVT (deep venous thrombosis)     Elevated WBC count 09/10/2024    Gross hematuria 07/30/2024    Hx of hepatitis     Hyperlipidemia     Hypertension     on medication monitoring    Hypertensive heart and chronic kidney disease with heart failure and stage 1 through stage 4 chronic kidney disease, or unspecified chronic kidney disease 08/29/2024    Documented on 01/03/2023      Peripheral artery disease     Prostate cancer 2001    Dr. Bai          Past Surgical History:   Procedure Laterality Date    ADENOIDECTOMY      APPENDECTOMY      Arthroscopic left knee Left     BARBOTAGE OF BLADDER N/A 7/30/2024    Procedure: BARBOTAGE, BLADDER;  Surgeon: Fabian Bai MD;  Location: [facility] OR;  Service: Urology;  Laterality: N/A;    BARBOTAGE OF URETER Bilateral 7/30/2024    Procedure: BARBOTAGE, URETER;  Surgeon: Fabian Bai MD;  Location: [facility] OR;  Service:  Urology;  Laterality: Bilateral;    CARDIAC CATHETERIZATION      no stents    COLONOSCOPY      CREATION, ILEAL CONDUIT N/A 9/16/2024    Procedure: CREATION, ILEAL CONDUIT;  Surgeon: Hari Salguero MD;  Location: [facility] OR [address] [place] [address];  Service: Urology;  Laterality: N/A;    CYSTOSCOPY W/ RETROGRADES Bilateral 7/30/2024    Procedure: CYSTOSCOPY, WITH RETROGRADE PYELOGRAM;  Surgeon: Fabian Bai MD;  Location: [facility] OR;  Service: Urology;  Laterality: Bilateral;    CYSTOSCOPY W/ URETERAL STENT PLACEMENT Left 7/30/2024    Procedure: CYSTOSCOPY, WITH URETERAL STENT INSERTION;  Surgeon: Fabian Bai MD;  Location: [facility] OR;  Service: Urology;  Laterality: Left;    DILATION OF URETER Left 7/30/2024    Procedure: DILATION, URETER;  Surgeon: Fabian Bai MD;  Location: [facility] OR;  Service: Urology;  Laterality: Left;    DILATION OF URETHRA N/A 7/30/2024    Procedure: DILATION, URETHRA;  Surgeon: Fabian Bai MD;  Location: [facility] OR;  Service: Urology;  Laterality: N/A;    INSERTION OF MULTIFOCAL INTRAOCULAR LENS Left 12/12/2018    Procedure: INSERTION, IOL, MULTIFOCAL;  Surgeon: Eleanor Seaman MD;  Location: [facility] OR;  Service: Ophthalmology;  Laterality: Left;    INSERTION OF MULTIFOCAL INTRAOCULAR LENS Right 4/3/2019    Procedure: INSERTION, IOL, MULTIFOCAL;  Surgeon: Eleanor Seaman MD;  Location: [facility] OR;  Service: Ophthalmology;  Laterality: Right;    NECK SURGERY  2002    PHACOEMULSIFICATION OF CATARACT Left 12/12/2018    Procedure: PHACOEMULSIFICATION, CATARACT;  Surgeon: Eleanor Seaman MD;  Location: [facility] OR;  Service: Ophthalmology;  Laterality: Left;    PHACOEMULSIFICATION OF CATARACT Right 4/3/2019    Procedure: PHACOEMULSIFICATION, CATARACT;  Surgeon: Eleanor Seaman MD;  Location: [facility] OR;  Service: Ophthalmology;  Laterality: Right;    PROSTATE SURGERY  2001    Radical prostectemy     RADICAL CYSTECTOMY N/A 9/16/2024    Procedure: CYSTECTOMY, RADICAL;  Surgeon: Hari Salguero MD;  Location: [facility] OR  2ND FLR;  Service: Urology;  Laterality: N/A;    SPINE SURGERY  2002    Fusion of c4,c5,c6    TONSILLECTOMY      TURBT (TRANSURETHRAL RESECTION OF BLADDER TUMOR) N/A 7/30/2024    Procedure: TURBT (TRANSURETHRAL RESECTION OF BLADDER TUMOR);  Surgeon: Fabian Bai MD;  Location: [facility];  Service: Urology;  Laterality: N/A;    URETEROSCOPY Bilateral 7/30/2024    Procedure: URETEROSCOPY;  Surgeon: Fabian Bai MD;  Location: [facility];  Service: Urology;  Laterality: Bilateral;       Time Tracking:     OT Date of Treatment: 09/17/24  OT Start Time: 1321  OT Stop Time: 1334  OT Total Time (min): 13 min    Billable Minutes:Evaluation 13    9/17/2024

## 2024-09-17 NOTE — PROGRESS NOTES
[facility]  Wound Care    Patient Name:  Rhett Johnson   MRN:  [id number]  Date: 9/17/2024  Diagnosis: <principal problem not specified>    History:     Past Medical History:   Diagnosis Date    Anemia 09/09/2024    Asthma     Cancer     Carotid occlusion, left     100% blockage    COPD (chronic obstructive pulmonary disease)     Current tobacco use 08/20/2018    Deep vein thrombosis     Disorder of kidney and ureter     DVT (deep venous thrombosis)     Elevated WBC count 09/10/2024    Gross hematuria 07/30/2024    Hx of hepatitis     Hyperlipidemia     Hypertension     on medication monitoring    Hypertensive heart and chronic kidney disease with heart failure and stage 1 through stage 4 chronic kidney disease, or unspecified chronic kidney disease 08/29/2024    Documented on 01/03/2023      Peripheral artery disease     Prostate cancer 2001    Dr. Bai        Social History     Socioeconomic History    Marital status:      Spouse name: Tammie   Tobacco Use    Smoking status: Former     Current packs/day: 1.00     Average packs/day: 1 pack/day for 51.0 years (51.0 ttl pk-yrs)     Types: Cigarettes    Smokeless tobacco: Never    Tobacco comments:     Smoked about 50-55 years about 1 pack per day.  Quit October 20, 2020.     Patient aware of smoking cessation program and aware of health risk due to smoking.    Substance and Sexual Activity    Alcohol use: Not Currently    Drug use: Never    Sexual activity: Not Currently     Partners: Female   Social History Narrative    Lives in [place] with his wife. He is retired from [employer]. 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      Social Determinants of Health     Financial Resource Strain: Low Risk  (9/2/2024)    Overall Financial Resource Strain (CARDIA)     Difficulty of Paying Living Expenses: Not hard at all   Food Insecurity: No Food Insecurity (9/2/2024)    Hunger Vital Sign      Worried About Running Out of Food in the Last Year: Never true     Ran Out of Food in the Last Year: Never true   Physical Activity: Unknown (9/2/2024)    Exercise Vital Sign     Days of Exercise per Week: 0 days   Stress: No Stress Concern Present (9/2/2024)    [place] [employer] - Occupational Stress Questionnaire     Feeling of Stress : Only a little   Housing Stability: Unknown (9/2/2024)    Housing Stability Vital Sign     Unable to Pay for Housing in the Last Year: No       Precautions:     Allergies as of 08/22/2024 - Reviewed 08/22/2024   Allergen Reaction Noted    Pcn [penicillins] Anaphylaxis 03/07/2016    Valium [diazepam] Anaphylaxis 01/11/2024       [facility] Assessment Details/Treatment   Patient seen for new ostomy consult. Initial ostomy education begun. Family at bedside for lesson. Goals of education include:    Pouch emptying, sizing/cuting pouch, and applying pouch  Stoma and nguyễn-stomal care  Food choices and hydration  Obtaining supplies    Discussed and demonstrated cutting ostomy pouch and emptying pouch of urine.  Discussed importance of hydration and increasing fluid intake.  Explained process of ordering supplies to be delivered to patient's home. Provided reading materials regarding today's training and will follow up with patient tomorrow. Supplies at the bedside.     Ostomy pouch is intact, with a good seal. Stents in place. Red urine in drainage bag.     09/17/2024

## 2024-09-17 NOTE — PLAN OF CARE
[facility] Plan of Care Note    Dx Radical Cystectomy, Urostomy creation    Shift Events Lost 2 Ivs, Got midline placed    Goals of Care: Pain control and up and walk    Neuro: A & O x 4 with sporadic bouts of confusion     Vital Signs: WDL    Respiratory: WDL    Diet: Clears    Is patient tolerating current diet? yes    GTTS: LR @ 125    Urine Output/Bowel Movement: Urine WDL / tea colored, No BMs    Drains/Tubes/Tube Feeds (include total output/shift): IVANIA drain to LLQ, Urostomy    Lines: 20 g Midline RUBI (placed by PICC team today)    Accuchecks:WDL    Skin: Very Bruised    Fall Risk Score: 10    Activity level? X 1 or 2 assist    Any scheduled procedures? none    Any safety concerns? none    Other: none

## 2024-09-17 NOTE — PLAN OF CARE
[address] [facility] [place]  Initial Discharge Assessment       Primary Care Provider: Sue Campos MD    Admission Diagnosis: Cancer of overlapping sites of bladder [C67.8]  Bladder tumor [D49.4]    Admission Date: 9/16/2024  Expected Discharge Date: 9/19/2024    Transition of Care Barriers: None    Payor: [employer] [facility] / Plan: [employer] CHOICES / Product Type: Medicare Advantage /     Extended Emergency Contact Information  Primary Emergency Contact: Rhina Johnson  Address: [address] [place] [address] [place] [address] [facility]  Home Phone: [phone]  Mobile Phone: [phone]  Relation: Spouse    Discharge Plan A: Home with family  Discharge Plan B: Home Health      [id number]  [place] - [id number]  [id number]  [place] [address]  Phone: [phone] Fax: [phone]      Initial Assessment (most recent)       Adult Discharge Assessment - 09/17/24 1447          Discharge Assessment    Assessment Type Discharge Planning Assessment     Confirmed/corrected address, phone number and insurance Yes     Confirmed Demographics Correct on Facesheet     Source of Information patient     Does patient/caregiver understand observation status Yes     Communicated PHOEBE with patient/caregiver Yes     People in Home spouse     Do you expect to return to your current living situation? Yes     Do you have help at home or someone to help you manage your care at home? Yes     Who are your caregiver(s) and their phone number(s)? wife     Prior to hospitilization cognitive status: Alert/Oriented     Current cognitive status: Alert/Oriented     Home Layout Able to live on 1st floor     Do you take prescription medications? Yes     Do you have prescription coverage? Yes     Do you have any problems affording any of your prescribed medications? No     Is the patient taking medications as prescribed? yes     Who is going to help you get home at discharge? wife     How do you get to  doctors appointments? family or friend will provide     Are you on dialysis? No     Do you take coumadin? No     Discharge Plan A Home with family     Discharge Plan B Home Health     Discharge Plan discussed with: Patient     Transition of Care Barriers None     SDOH --   no       Physical Activity    On average, how many days per week do you engage in moderate to strenuous exercise (like a brisk walk)? 0 days     On average, how many minutes do you engage in exercise at this level? 0 min        Financial Resource Strain    How hard is it for you to pay for the very basics like food, housing, medical care, and heating? Not hard at all        Housing Stability    In the last 12 months, was there a time when you were not able to pay the mortgage or rent on time? No     At any time in the past 12 months, were you homeless or living in a shelter (including now)? No        Transportation Needs    Has the lack of transportation kept you from medical appointments, meetings, work or from getting things needed for daily living? No        Food Insecurity    Within the past 12 months, you worried that your food would run out before you got the money to buy more. Never true     Within the past 12 months, the food you bought just didn't last and you didn't have money to get more. Never true        Stress    Do you feel stress - tense, restless, nervous, or anxious, or unable to sleep at night because your mind is troubled all the time - these days? Not at all        Social Isolation    How often do you feel lonely or isolated from those around you?  Never        Alcohol Use    Q1: How often do you have a drink containing alcohol? Never     Q2: How many drinks containing alcohol do you have on a typical day when you are drinking? Patient does not drink     Q3: How often do you have six or more drinks on one occasion? Never        Simulation Appliance    In the past 12 months has the electric, gas, oil, or water company threatened to shut  off services in your home? No        Health Literacy    How often do you need to have someone help you when you read instructions, pamphlets, or other written material from your doctor or pharmacy? Never                   The CM met with the patient at bedside to complete the DPA.  The CM placed name and contact information on the blackboard in the patient's room.  Use preferred pharmacy / bedside delivery for any necessary  medications at the time of discharge.The patient is independent with all ADLs.  The patient uses the following home equipment. Walker and cane. The patient is not on Dialysis or Coumadin. The patient's wife will provide assistance to the patient upon discharge. The patient's wife will provide transportation upon discharge .  The CM will continue to follow for course of hospitalization.

## 2024-09-17 NOTE — ANESTHESIA POST-OP PAIN MANAGEMENT
Acute Pain Service Progress Note    Rhett Johnson is a 73 y.o., male, [id number].    Surgery:  Radical cystectomy with bilateral pelvic node dissection and ileal conduit.     Post Op Day #: 1    Catheter type: perineural  Bilateral CARLO catheters    Infusion type: Ropivacaine 0.2%  15cc/3hr basal    Problem List:    Active Hospital Problems    Diagnosis  POA    Malignant neoplasm of overlapping sites of bladder [C67.8]  Yes      Resolved Hospital Problems   No resolved problems to display.       Subjective:     General appearance of alert, oriented, no complaints   Pain with rest: 4    Faces   Pain with movement: 5    Faces   Side Effects    1. Pruritis No    2. Nausea No    3. Motor Blockade No, 0=Ability to raise lower extremities off bed    4. Sedation No, 1=awake and alert    Objective:     Catheter site clean, dry, intact         Vitals   Vitals:    09/17/24 0706   BP: 125/72   Pulse: 78   Resp: 20   Temp: 36.6 °C (97.8 °F)        Labs    Admission on 09/16/2024   Component Date Value Ref Range Status    Group & Rh 09/16/2024 A NEG   Final    Indirect Bryce 09/16/2024 NEG   Final    Specimen Outdate 09/16/2024 09/19/2024 23:59   Final    UNIT NUMBER 09/16/2024 [id number]   Preliminary    Product Code 09/16/2024 [id number]   Preliminary    DISPENSE STATUS 09/16/2024 CROSSMATCHED   Preliminary    CODING SYSTEM 09/16/2024 [id number]   Preliminary    Unit Blood Type Code 09/16/2024 0600   Preliminary    Unit Blood Type 09/16/2024 A NEG   Preliminary    Unit Expiration 09/16/2024 [id number]   Preliminary    CROSSMATCH INTERPRETATION 09/16/2024 Compatible   Preliminary    UNIT NUMBER 09/16/2024 [id number]   Preliminary    Product Code 09/16/2024 [id number]   Preliminary    DISPENSE STATUS 09/16/2024 CROSSMATCHED   Preliminary    CODING SYSTEM 09/16/2024 [id number]   Preliminary    Unit Blood Type Code 09/16/2024 0600   Preliminary    Unit Blood Type 09/16/2024 A NEG   Preliminary    Unit Expiration 09/16/2024  [id number]   Preliminary    CROSSMATCH INTERPRETATION 09/16/2024 Compatible   Preliminary    WBC 09/16/2024 15.94 (H)  3.90 - 12.70 K/uL Final    RBC 09/16/2024 3.22 (L)  4.60 - 6.20 M/uL Final    Hemoglobin 09/16/2024 9.7 (L)  14.0 - 18.0 g/dL Final    Hematocrit 09/16/2024 32.1 (L)  40.0 - 54.0 % Final    MCV 09/16/2024 100 (H)  82 - 98 fL Final    MCH 09/16/2024 30.1  27.0 - 31.0 pg Final    MCHC 09/16/2024 30.2 (L)  32.0 - 36.0 g/dL Final    RDW 09/16/2024 12.9  11.5 - 14.5 % Final    Platelets 09/16/2024 246  150 - 450 K/uL Final    MPV 09/16/2024 9.5  9.2 - 12.9 fL Final    Immature Granulocytes 09/16/2024 1.4 (H)  0.0 - 0.5 % Final    Gran # (ANC) 09/16/2024 13.9 (H)  1.8 - 7.7 K/uL Final    Immature Grans (Abs) 09/16/2024 0.22 (H)  0.00 - 0.04 K/uL Final    Lymph # 09/16/2024 0.8 (L)  1.0 - 4.8 K/uL Final    Mono # 09/16/2024 0.9  0.3 - 1.0 K/uL Final    Eos # 09/16/2024 0.0  0.0 - 0.5 K/uL Final    Baso # 09/16/2024 0.04  0.00 - 0.20 K/uL Final    nRBC 09/16/2024 0  0 /100 WBC Final    Gran % 09/16/2024 87.3 (H)  38.0 - 73.0 % Final    Lymph % 09/16/2024 4.9 (L)  18.0 - 48.0 % Final    Mono % 09/16/2024 5.9  4.0 - 15.0 % Final    Eosinophil % 09/16/2024 0.2  0.0 - 8.0 % Final    Basophil % 09/16/2024 0.3  0.0 - 1.9 % Final    Differential Method 09/16/2024 Automated   Final    Sodium 09/16/2024 134 (L)  136 - 145 mmol/L Final    Potassium 09/16/2024 6.0 (H)  3.5 - 5.1 mmol/L Final    Chloride 09/16/2024 108  95 - 110 mmol/L Final    CO2 09/16/2024 20 (L)  23 - 29 mmol/L Final    Glucose 09/16/2024 143 (H)  70 - 110 mg/dL Final    BUN 09/16/2024 15  8 - 23 mg/dL Final    Creatinine 09/16/2024 1.6 (H)  0.5 - 1.4 mg/dL Final    Calcium 09/16/2024 9.2  8.7 - 10.5 mg/dL Final    Anion Gap 09/16/2024 6 (L)  8 - 16 mmol/L Final    eGFR 09/16/2024 45.5 (A)  >60 mL/min/1.73 m^2 Final    Magnesium 09/16/2024 2.2  1.6 - 2.6 mg/dL Final    Phosphorus 09/16/2024 4.0  2.7 - 4.5 mg/dL Final    POCT Glucose 09/16/2024  367 (H)  70 - 110 mg/dL Final    Sodium 09/17/2024 133 (L)  136 - 145 mmol/L Final    Potassium 09/17/2024 5.9 (H)  3.5 - 5.1 mmol/L Final    Chloride 09/17/2024 105  95 - 110 mmol/L Final    CO2 09/17/2024 16 (L)  23 - 29 mmol/L Final    Glucose 09/17/2024 253 (H)  70 - 110 mg/dL Final    BUN 09/17/2024 16  8 - 23 mg/dL Final    Creatinine 09/17/2024 1.8 (H)  0.5 - 1.4 mg/dL Final    Calcium 09/17/2024 8.8  8.7 - 10.5 mg/dL Final    Anion Gap 09/17/2024 12  8 - 16 mmol/L Final    eGFR 09/17/2024 39.5 (A)  >60 mL/min/1.73 m^2 Final    Sodium 09/17/2024 132 (L)  136 - 145 mmol/L Final    Potassium 09/17/2024 4.9  3.5 - 5.1 mmol/L Final    Chloride 09/17/2024 104  95 - 110 mmol/L Final    CO2 09/17/2024 16 (L)  23 - 29 mmol/L Final    Glucose 09/17/2024 126 (H)  70 - 110 mg/dL Final    BUN 09/17/2024 17  8 - 23 mg/dL Final    Creatinine 09/17/2024 1.6 (H)  0.5 - 1.4 mg/dL Final    Calcium 09/17/2024 8.7  8.7 - 10.5 mg/dL Final    Anion Gap 09/17/2024 12  8 - 16 mmol/L Final    eGFR 09/17/2024 45.5 (A)  >60 mL/min/1.73 m^2 Final    Magnesium 09/17/2024 1.8  1.6 - 2.6 mg/dL Final    Phosphorus 09/17/2024 3.4  2.7 - 4.5 mg/dL Final    WBC 09/17/2024 13.18 (H)  3.90 - 12.70 K/uL Final    RBC 09/17/2024 2.96 (L)  4.60 - 6.20 M/uL Final    Hemoglobin 09/17/2024 8.6 (L)  14.0 - 18.0 g/dL Final    Hematocrit 09/17/2024 28.9 (L)  40.0 - 54.0 % Final    MCV 09/17/2024 98  82 - 98 fL Final    MCH 09/17/2024 29.1  27.0 - 31.0 pg Final    MCHC 09/17/2024 29.8 (L)  32.0 - 36.0 g/dL Final    RDW 09/17/2024 12.8  11.5 - 14.5 % Final    Platelets 09/17/2024 265  150 - 450 K/uL Final    MPV 09/17/2024 10.2  9.2 - 12.9 fL Final    Immature Granulocytes 09/17/2024 0.5  0.0 - 0.5 % Final    Gran # (ANC) 09/17/2024 10.8 (H)  1.8 - 7.7 K/uL Final    Immature Grans (Abs) 09/17/2024 0.07 (H)  0.00 - 0.04 K/uL Final    Lymph # 09/17/2024 1.0  1.0 - 4.8 K/uL Final    Mono # 09/17/2024 1.2 (H)  0.3 - 1.0 K/uL Final    Eos # 09/17/2024 0.0  0.0 -  0.5 K/uL Final    Baso # 09/17/2024 0.05  0.00 - 0.20 K/uL Final    nRBC 09/17/2024 0  0 /100 WBC Final    Gran % 09/17/2024 82.1 (H)  38.0 - 73.0 % Final    Lymph % 09/17/2024 7.7 (L)  18.0 - 48.0 % Final    Mono % 09/17/2024 9.3  4.0 - 15.0 % Final    Eosinophil % 09/17/2024 0.0  0.0 - 8.0 % Final    Basophil % 09/17/2024 0.4  0.0 - 1.9 % Final    Differential Method 09/17/2024 Automated   Final    Sodium 09/17/2024 132 (L)  136 - 145 mmol/L Final    Potassium 09/17/2024 4.9  3.5 - 5.1 mmol/L Final    Chloride 09/17/2024 104  95 - 110 mmol/L Final    CO2 09/17/2024 16 (L)  23 - 29 mmol/L Final    Glucose 09/17/2024 126 (H)  70 - 110 mg/dL Final    BUN 09/17/2024 17  8 - 23 mg/dL Final    Creatinine 09/17/2024 1.6 (H)  0.5 - 1.4 mg/dL Final    Calcium 09/17/2024 8.7  8.7 - 10.5 mg/dL Final    Anion Gap 09/17/2024 12  8 - 16 mmol/L Final    eGFR 09/17/2024 45.5 (A)  >60 mL/min/1.73 m^2 Final    POCT Glucose 09/17/2024 276 (H)  70 - 110 mg/dL Final    POCT Glucose 09/17/2024 219 (H)  70 - 110 mg/dL Final    POCT Glucose 09/17/2024 136 (H)  70 - 110 mg/dL Final        Meds   Current Facility-Administered Medications   Medication Dose Route Frequency Provider Last Rate Last Admin    acetaminophen tablet 650 mg  650 mg Oral Q6H Elida Rojo MD   650 mg at 09/17/24 0507    alvimopan capsule 12 mg  12 mg Oral BID Elida Rojo MD        atorvastatin tablet 80 mg  80 mg Oral Daily Elida Rojo MD        carvediloL tablet 12.5 mg  12.5 mg Oral Daily Elida Rojo MD        cyclobenzaprine tablet 10 mg  10 mg Oral TID PRN Rodger Giraldo DO        dextrose 10% bolus 125 mL 125 mL  12.5 g Intravenous PRN Lianne Talley MD        dextrose 10% bolus 250 mL 250 mL  25 g Intravenous PRN Chao Krueger MD        dextrose 10% bolus 250 mL 250 mL  25 g Intravenous PRN Lianne Talley MD        diphenhydrAMINE injection 12.5 mg  12.5 mg Intravenous Q6H PRN Elida Rojo MD         famotidine (PF) injection 20 mg  20 mg Intravenous BID Elida Rojo MD   20 mg at 09/16/24 2007    gabapentin capsule 300 mg  300 mg Oral Q8H Rodger Giraldo DO   300 mg at 09/17/24 0456    heparin (porcine) injection 5,000 Units  5,000 Units Subcutaneous Q8H Elida Rojo MD   5,000 Units at 09/17/24 0456    HYDROmorphone (DILAUDID) 1 mg/mL injection             lactated ringers infusion   Intravenous Continuous Elida Rojo  mL/hr at 09/17/24 0504 New Bag at 09/17/24 0504    ondansetron injection 4 mg  4 mg Intravenous Q8H PRN Elida Rojo MD        oxyCODONE immediate release tablet 5 mg  5 mg Oral Q4H PRN Elida Rojo MD        oxyCODONE immediate release tablet Tab 10 mg  10 mg Oral Q4H PRN Elida Rojo MD   10 mg at 09/16/24 1947    polyethylene glycol packet 17 g  17 g Oral Daily Elida Rojo MD        prochlorperazine injection Soln 5 mg  5 mg Intravenous Q6H PRN Elida Rojo MD        ropivacaine 0.2% Perineural Pump infusion 500 ML   Perineural Continuous Elida Rojo MD   New Bag at 09/16/24 1919    ropivacaine 0.2% Perineural Pump infusion 500 ML   Perineural Continuous Elida Rojo MD   New Bag at 09/16/24 1920    senna-docusate 8.6-50 mg per tablet 1 tablet  1 tablet Oral Daily PRN Cathy Rome MD            Anticoagulant dose Heparin 5,000 units every 8 hours.    Assessment:  Pain control adequate. Patient given prn dilaudid 1 mg and prn oxycodone 10 mg once yesterday after surgery. Patient says his pain is well controlled this morning. Resting comfortably in bed.     Plan:    Patient doing well, continue present treatment.    1.) Bilateral CARLO PNC infusions of Ropivacaine 0.2% at 15cc/3hr.    2.) Multimodal Pain Regimen   - Tylenol 650 mg q6h    - Gabapentin 300 mg q8h    - Flexeril 10 mg q8h PRN    - Oxycodone 5 mg q4h PRN for moderate pain and severe pain   - Discontinued IV dilaudid and oxycodone 10 mg prn      3.) Bowel Regimen  - Miralax daily and PRN senna in setting of opioids to avoid constipation     Case discussed with staff, Dr. Pugh; final recommendations per attestation above.     Thank you for your consult and allowing us to participate in the care of this patient. We will continue to follow along. Please call the Acute Pain Service at [phone] or Anesthesia at [phone] if you have any questions or concerns.     Nael Roach MD (PGY1)  Anesthesiology   [facility]

## 2024-09-17 NOTE — ASSESSMENT & PLAN NOTE
Monty Johnson is a 73 year old who is s/p open radical cystectomy and ileal conduit creation.     - Diet: Clear Liquid Diet   - Multimodal pain control   - Bowel prep: Miralax   - PT/OT consulted   - Wound care consulted   - Encourage ambulation   - Encourage incentive spirometer usage   - Delirium precautions    Dispo - Continue inpatient care

## 2024-09-17 NOTE — CONSULTS
Single lumen 20G, 10CM midline placed in the right basilic vein. Needle advanced into the vessel under real time ultrasound guidance. Image recorded and saved.    Max dwell date 10/16/24.  Lot number: [id number]    Kidney function reviewed and shown to be appropriate for midline insertion.

## 2024-09-17 NOTE — PT/OT/SLP EVAL
Physical Therapy Evaluation    Patient Name:  Rhett Johnson   MRN:  [id number]    Recommendations:     Discharge Recommendations: Low Intensity Therapy   Discharge Equipment Recommendations: none   Barriers to discharge: None    Assessment:     Rhett Johnson is a 73 y.o. male admitted with a medical diagnosis of <principal problem not specified>.  He presents with the following impairments/functional limitations: weakness, impaired endurance, impaired self care skills, impaired functional mobility, gait instability, impaired balance, pain Pt. cooperative and tolerated treatment fairly well, but limited by c/o dizziness/weakness. Pt. progressing with mobility with RW. While pt. was attempting to get OOB, PT noticed blood dripping from pt.'s arm and realized that one of his IVs had become dislodged. PT applied pressure and notified nursing, who dressed IV site.    Rehab Prognosis: Good; patient would benefit from acute skilled PT services to address these deficits and reach maximum level of function.    Recent Surgery: Procedure(s) (LRB):  CREATION, ILEAL CONDUIT (N/A)  CYSTECTOMY, RADICAL (N/A) 1 Day Post-Op    Plan:     During this hospitalization, patient to be seen 4 x/week to address the identified rehab impairments via gait training, therapeutic activities, therapeutic exercises and progress toward the following goals:    Plan of Care Expires:  10/17/24    Subjective     Chief Complaint: dizziness and weakness  Patient/Family Comments/goals: pt. Agreeable to PT  Pain/Comfort:  Pain Rating 1: 0/10  Location - Orientation 1: generalized  Location 1: abdomen  Pain Addressed 1: Pre-medicate for activity, Reposition, Distraction, Cessation of Activity, Nurse notified  Pain Rating Post-Intervention 1: 4/10    Patients cultural, spiritual, Yarsanism conflicts given the current situation: no    Living Environment:  Pt. Lives with spouse and adult child and their family in [facility] with one YOLY  Prior to admission,  patients level of function was indep.  Equipment used at home: bedside commode, walker, rolling, wheelchair.  Upon discharge, patient will have assistance from family.    Objective:     Communicated with nursing prior to session.  Patient found supine with perineural catheter, peripheral IV, IVANIA drain, telemetry, SCD  upon PT entry to room.    General Precautions: Standard, fall  Orthopedic Precautions:N/A   Braces: N/A  Respiratory Status: Nasal cannula, flow 2 L/min    Exams:  RLE ROM: WFL  RLE Strength: WFL  LLE ROM: WFL  LLE Strength: WFL    Functional Mobility:  Bed Mobility:     Rolling Right: minimum assistance  Scooting: minimum assistance  Supine to Sit: minimum assistance  Transfers:     Sit to Stand:  minimum assistance with rolling walker  Gait: 20' with RW and Min A for balance/safety/guidance. Pt. amb. with decreased step length/sandee and slightly unsteady gait. Pt. required to sit in rolling bedside chair due to c/o dizziness/weakness.  Balance: fair-      AM-PAC 6 CLICK MOBILITY  Total Score:18       Treatment & Education:  Discussed therapy needs, goals, and POC. Pt. Performed sitting balance/tolerance along EOB with CGA >5 min.    Patient left up in chair with all lines intact and call button in reach.    GOALS:   Multidisciplinary Problems       Physical Therapy Goals          Problem: Physical Therapy    Goal Priority Disciplines Outcome Goal Variances Interventions   Physical Therapy Goal     PT, PT/OT Progressing     Description: Goals to be met by: 10/1/2024     Patient will increase functional independence with mobility by performin. Supine to sit with Set-up Gillespie  2. Sit to supine with Set-up Gillespie  3. Sit to stand transfer with Supervision  4. Bed to chair transfer with Supervision using LRAD  5. Gait  x 200 feet with Supervision using LRAD.   6. Lower extremity exercise program x15 reps per handout, with supervision                         History:     Past Medical  History:   Diagnosis Date    Anemia 09/09/2024    Asthma     Cancer     Carotid occlusion, left     100% blockage    COPD (chronic obstructive pulmonary disease)     Current tobacco use 08/20/2018    Deep vein thrombosis     Disorder of kidney and ureter     DVT (deep venous thrombosis)     Elevated WBC count 09/10/2024    Gross hematuria 07/30/2024    Hx of hepatitis     Hyperlipidemia     Hypertension     on medication monitoring    Hypertensive heart and chronic kidney disease with heart failure and stage 1 through stage 4 chronic kidney disease, or unspecified chronic kidney disease 08/29/2024    Documented on 01/03/2023      Peripheral artery disease     Prostate cancer 2001    Dr. Bai        Past Surgical History:   Procedure Laterality Date    ADENOIDECTOMY      APPENDECTOMY      Arthroscopic left knee Left     BARBOTAGE OF BLADDER N/A 7/30/2024    Procedure: BARBOTAGE, BLADDER;  Surgeon: Fabian Bai MD;  Location: [facility];  Service: Urology;  Laterality: N/A;    BARBOTAGE OF URETER Bilateral 7/30/2024    Procedure: BARBOTAGE, URETER;  Surgeon: Fabian Bai MD;  Location: [facility];  Service: Urology;  Laterality: Bilateral;    CARDIAC CATHETERIZATION      no stents    COLONOSCOPY      CREATION, ILEAL CONDUIT N/A 9/16/2024    Procedure: CREATION, ILEAL CONDUIT;  Surgeon: Hari Salguero MD;  Location: [address];  Service: Urology;  Laterality: N/A;    CYSTOSCOPY W/ RETROGRADES Bilateral 7/30/2024    Procedure: CYSTOSCOPY, WITH RETROGRADE PYELOGRAM;  Surgeon: Fabian Bai MD;  Location: [facility];  Service: Urology;  Laterality: Bilateral;    CYSTOSCOPY W/ URETERAL STENT PLACEMENT Left 7/30/2024    Procedure: CYSTOSCOPY, WITH URETERAL STENT INSERTION;  Surgeon: Fabian Bai MD;  Location: [facility];  Service: Urology;  Laterality: Left;    DILATION OF URETER Left 7/30/2024    Procedure: DILATION, URETER;  Surgeon: Fabian Bai MD;  Location: [facility];  Service: Urology;   Laterality: Left;    DILATION OF URETHRA N/A 7/30/2024    Procedure: DILATION, URETHRA;  Surgeon: Fabian Bai MD;  [facility]: [facility];  Service: Urology;  Laterality: N/A;    INSERTION OF MULTIFOCAL INTRAOCULAR LENS Left 12/12/2018    Procedure: INSERTION, IOL, MULTIFOCAL;  Surgeon: Eleanor Seaman MD;  Location: [facility];  Service: Ophthalmology;  Laterality: Left;    INSERTION OF MULTIFOCAL INTRAOCULAR LENS Right 4/3/2019    Procedure: INSERTION, IOL, MULTIFOCAL;  Surgeon: Eleanor Seaman MD;  Location: [facility];  Service: Ophthalmology;  Laterality: Right;    NECK SURGERY  2002    PHACOEMULSIFICATION OF CATARACT Left 12/12/2018    Procedure: PHACOEMULSIFICATION, CATARACT;  Surgeon: Eleanor Seaman MD;  Location: [facility];  Service: Ophthalmology;  Laterality: Left;    PHACOEMULSIFICATION OF CATARACT Right 4/3/2019    Procedure: PHACOEMULSIFICATION, CATARACT;  Surgeon: Eleanor Seaman MD;  Location: [facility];  Service: Ophthalmology;  Laterality: Right;    PROSTATE SURGERY  2001    Radical prostectemy     RADICAL CYSTECTOMY N/A 9/16/2024    Procedure: CYSTECTOMY, RADICAL;  Surgeon: Hari Salguero MD;  Location: [address];  Service: Urology;  Laterality: N/A;    SPINE SURGERY  2002    Fusion of c4,c5,c6    TONSILLECTOMY      TURBT (TRANSURETHRAL RESECTION OF BLADDER TUMOR) N/A 7/30/2024    Procedure: TURBT (TRANSURETHRAL RESECTION OF BLADDER TUMOR);  Surgeon: Fabian Bai MD;  Location: [facility];  Service: Urology;  Laterality: N/A;    URETEROSCOPY Bilateral 7/30/2024    Procedure: URETEROSCOPY;  Surgeon: Fabian Bai MD;  Location: [facility];  Service: Urology;  Laterality: Bilateral;       Time Tracking:     PT Received On: 09/17/24  PT Start Time: 0940     PT Stop Time: 1011  PT Total Time (min): 31 min     Billable Minutes: Evaluation 15 and Therapeutic Activity 16      09/17/2024

## 2024-09-17 NOTE — PROGRESS NOTES
Naeem Starks - [facility]  Urology  Progress Note    Patient Name: Rhett Johnson  MRN: [id number]  Admission Date: 9/16/2024  Hospital Length of Stay: 1 days  Code Status: Full Code   Attending Provider: Hari Salguero MD   Primary Care Physician: Sue Campos MD    Subjective:     HPI:  Rhett Johnson is a 73 y.o. male  With [id number] urothelial cell carcinoma of the bladder.       The patient initially presented with prostate cancer and urinary incontinence s/p radical retropubic prostatectomy in 2001. In 07/2024 he presented to the ED with gross hematuria. Subsequent work up revealed asymmetric bladder wall thickening and urachal remnant. Underwent TURBT with bilateral retrograde pyelogram and left ureteral stent placement on 7/30/24. Path returned as HGMIBC.      Upper tract imaging (modality bilateral retrograde pyelogram, 07/30/24) - There was left hydroureteronephrosis down to the level of the bladder.  This was grade 3 hydronephrosis.  The distal ureter was evaluated with the semi rigid 6.8 [place] self-dilating Olympus ureteroscope.  There was no evidence of tumor involvement of the ureteral mucosa.      TURBT (07/30/24) - Urinary bladder, transurethral resection of bladder tumor (TURBT):   - High-grade papillary urothelial carcinoma   - Muscularis propria is present and is involved by carcinoma   - Lymphovascular and perineural invasion are identified   - See synoptic report below   - Mismatch repair protein testing is pending and results will be reported in a supplemental report      CT C/A/P-8/6/2024- no nodes or metastasis.  Bone scan-8/14/2024- no osseous metastasis.  Creatinine-1.9 (eGFR 37), albumin 2.7.     Due to renal function not felt to be a candidate for platinum based chemotherapy.    Interval History: No acute events overnight. The patient was mildly hypertensive overnight. Potassium was shifted. On morning rounds this patient was only oriented x 2. Belching, however denies nausea  "and vomiting. No passing flatus.     Review of Systems  Objective:     Temp:  [97 °F (36.1 °C)-97.9 °F (36.6 °C)] 97.9 °F (36.6 °C)  Pulse:  [59-85] 80  Resp:  [12-20] 16  SpO2:  [95 %-100 %] 100 %  BP: (141-199)/() 161/82     Body mass index is 24.39 kg/m².           Drains       Drain  Duration                  Closed/Suction Drain 09/16/24 1751 Left Abdomen Bulb 19 Fr. <1 day         Ureteral Drain/Stent 09/16/24 1652 Left ureter 7.2 Fr. <1 day         Ureteral Drain/Stent 09/16/24 1652 Right ureter 7.2 Fr. <1 day         Urostomy 09/16/24 1751 ileal conduit RUQ <1 day                     Physical Exam  Constitutional:       Appearance: Normal appearance.   HENT:      Head: Normocephalic and atraumatic.   Abdominal:      General: Abdomen is flat.      Palpations: Abdomen is soft.      Comments: Ostomy is healthy appearing covered in appliance.   Stents in place.  LLQ IVANIA draining SS output.   Incisions are clean dry and intact.   Skin:     General: Skin is warm.   Neurological:      General: No focal deficit present.      Mental Status: He is alert.      Comments: Orientated to person and place.   Psychiatric:         Mood and Affect: Mood normal.           Significant Labs:    BMP:  Recent Labs   Lab 09/16/24  1855 09/17/24  0014 09/17/24  0504   * 133* 132*  132*   K 6.0* 5.9* 4.9  4.9    105 104  104   CO2 20* 16* 16*  16*   BUN 15 16 17  17   CREATININE 1.6* 1.8* 1.6*  1.6*   CALCIUM 9.2 8.8 8.7  8.7       CBC:   Recent Labs   Lab 09/13/24  0708 09/16/24  1855 09/17/24  0504   WBC 9.34 15.94* 13.18*   HGB 11.3* 9.7* 8.6*   HCT 36.5* 32.1* 28.9*    246 265       Blood Culture: No results for input(s): "LABBLOO" in the last 168 hours.  Urine Culture: No results for input(s): "LABURIN" in the last 168 hours.  Urine Studies: No results for input(s): "COLORU", "APPEARANCEUA", "PHUR", "SPECGRAV", "PROTEINUA", "GLUCUA", "KETONESU", "BILIRUBINUA", "OCCULTUA", "NITRITE", "UROBILINOGEN", " ""LEUKOCYTESUR", "RBCUA", "WBCUA", "BACTERIA", "SQUAMEPITHEL", "HYALINECASTS" in the last 168 hours.    Invalid input(s): "GIOVANI"    Significant Imaging:  All pertinent imaging results/findings from the past 24 hours have been reviewed.                  Assessment/Plan:     Malignant neoplasm of overlapping sites of bladder  Monty Johnson is a 73 year old who is s/p open radical cystectomy and ileal conduit creation.     - Diet: Clear Liquid Diet   - Multimodal pain control   - Bowel prep: Miralax   - PT/OT consulted   - Wound care consulted   - Encourage ambulation   - Encourage incentive spirometer usage   - Delirium precautions    Dispo - Continue inpatient care         VTE Risk Mitigation (From admission, onward)           Ordered     heparin (porcine) injection 5,000 Units  Every 8 hours         09/16/24 1818     IP VTE HIGH RISK PATIENT  Once         09/16/24 1818     Place sequential compression device  Until discontinued         09/16/24 1818                    Rashmi Gomez MD  Urology  [facility]  "

## 2024-09-17 NOTE — NURSING
"[facility] Plan of Care Note    Dx:   Cancer of overlapping sites of bladder [C67.8]  Bladder tumor [D49.4]    Shift Events: none    Goals of Care: pain management    Neuro: A&Ox4    Vital Signs: BP (!) 167/99   Pulse 78   Temp 97.5 °F (36.4 °C) (Oral)   Resp 14   Ht 5' 10" (1.778 m)   Wt 77.1 kg (170 lb)   SpO2 99%   BMI 24.39 kg/m²     Respiratory: WNL    Diet: Diet Clear Liquid      Is patient tolerating current diet? yes    GTTS: 0      Urine Output/Bowel Movement:   I/O this shift:  In: 193.7 [I.V.:193.7]  Out: 380 [Urine:250; Drains:130]  Last Bowel Movement: 09/16/24      Drains/Tubes/Tube Feeds (include total output/shift):   I/O this shift:  In: 193.7 [I.V.:193.7]  Out: 380 [Urine:250; Drains:130]      Lines:       Accuchecks:every 30 minutes x 2    Skin: midline incision    Fall Risk Score: 10      Activity level? bedrest    Any scheduled procedures? none    Any safety concerns? Fall precautions    Other: pain management   "

## 2024-09-17 NOTE — PROGRESS NOTES
Pharmacist Renal Dose Adjustment Note    Rehtt Johnson is a 73 y.o. male being treated with the medication famotidine    Patient Data:    Vital Signs (Most Recent):  Temp: 97.8 °F (36.6 °C) (09/17/24 0706)  Pulse: 78 (09/17/24 0706)  Resp: 20 (09/17/24 0706)  BP: 125/72 (09/17/24 0904)  SpO2: 95 % (09/17/24 0706) Vital Signs (72h Range):  Temp:  [97 °F (36.1 °C)-97.9 °F (36.6 °C)]   Pulse:  [59-85]   Resp:  [12-20]   BP: (125-199)/()   SpO2:  [95 %-100 %]      Recent Labs   Lab 09/17/24  0014 09/17/24  0504 09/17/24  0756   CREATININE 1.8* 1.6*  1.6* 1.5*     Serum creatinine: 1.5 mg/dL (H) 09/17/24 0756  Estimated creatinine clearance: 46 mL/min (A)    Famotidine 20mg BID will be changed to famotidine 20mg QD    Pharmacist's Name: Anthony Fuller  Pharmacist's Extension: [id number]

## 2024-09-17 NOTE — PLAN OF CARE
Problem: Occupational Therapy  Goal: Occupational Therapy Goal  Description: Goals to be met by: 10/17/2024     Patient will increase functional independence with ADLs by performing:    UE Dressing with Modified [place].  LE Dressing with Modified [place].  Grooming while standing at sink with Set-up Assistance.  Toileting from toilet with Modified [place] for hygiene and clothing management.   Supine to sit with Modified [place].  Step transfer with Modified [place]  Toilet transfer to toilet with Modified [place].    Outcome: Progressing       OT eval complete & goals established.

## 2024-09-17 NOTE — PLAN OF CARE
Problem: Adult Inpatient Plan of Care  Goal: Plan of Care Review  Outcome: Not Progressing  Goal: Patient-Specific Goal (Individualized)  Outcome: Not Progressing  Goal: Absence of Hospital-Acquired Illness or Injury  Outcome: Not Progressing  Goal: Optimal Comfort and Wellbeing  Outcome: Not Progressing  Goal: Readiness for Transition of Care  Outcome: Not Progressing     Problem: Infection  Goal: Absence of Infection Signs and Symptoms  Outcome: Not Progressing     Problem: Wound  Goal: Optimal Coping  Outcome: Not Progressing  Goal: Optimal Functional Ability  Outcome: Not Progressing  Goal: Absence of Infection Signs and Symptoms  Outcome: Not Progressing  Goal: Improved Oral Intake  Outcome: Not Progressing  Goal: Optimal Pain Control and Function  Outcome: Not Progressing  Goal: Skin Health and Integrity  Outcome: Not Progressing  Goal: Optimal Wound Healing  Outcome: Not Progressing     Problem: Fall Injury Risk  Goal: Absence of Fall and Fall-Related Injury  Outcome: Not Progressing

## 2024-09-17 NOTE — SUBJECTIVE & OBJECTIVE
"Interval History: No acute events overnight. The patient was mildly hypertensive overnight. Potassium was shifted. On morning rounds this patient was only oriented x 2. Belching, however denies nausea and vomiting. No passing flatus.     Review of Systems  Objective:     Temp:  [97 °F (36.1 °C)-97.9 °F (36.6 °C)] 97.9 °F (36.6 °C)  Pulse:  [59-85] 80  Resp:  [12-20] 16  SpO2:  [95 %-100 %] 100 %  BP: (141-199)/() 161/82     Body mass index is 24.39 kg/m².           Drains       Drain  Duration                  Closed/Suction Drain 09/16/24 1751 Left Abdomen Bulb 19 Fr. <1 day         Ureteral Drain/Stent 09/16/24 1652 Left ureter 7.2 Fr. <1 day         Ureteral Drain/Stent 09/16/24 1652 Right ureter 7.2 Fr. <1 day         Urostomy 09/16/24 1751 ileal conduit RUQ <1 day                     Physical Exam  Constitutional:       Appearance: Normal appearance.   HENT:      Head: Normocephalic and atraumatic.   Abdominal:      General: Abdomen is flat.      Palpations: Abdomen is soft.      Comments: Ostomy is healthy appearing covered in appliance.   Stents in place.  LLQ IVANIA draining SS output.   Incisions are clean dry and intact.   Skin:     General: Skin is warm.   Neurological:      General: No focal deficit present.      Mental Status: He is alert.      Comments: Orientated to person and place.   Psychiatric:         Mood and Affect: Mood normal.           Significant Labs:    BMP:  Recent Labs   Lab 09/16/24 1855 09/17/24  0014 09/17/24  0504   * 133* 132*  132*   K 6.0* 5.9* 4.9  4.9    105 104  104   CO2 20* 16* 16*  16*   BUN 15 16 17  17   CREATININE 1.6* 1.8* 1.6*  1.6*   CALCIUM 9.2 8.8 8.7  8.7       CBC:   Recent Labs   Lab 09/13/24  0708 09/16/24  1855 09/17/24  0504   WBC 9.34 15.94* 13.18*   HGB 11.3* 9.7* 8.6*   HCT 36.5* 32.1* 28.9*    246 265       Blood Culture: No results for input(s): "LABBLOO" in the last 168 hours.  Urine Culture: No results for input(s): " ""LABURIN" in the last 168 hours.  Urine Studies: No results for input(s): "COLORU", "APPEARANCEUA", "PHUR", "SPECGRAV", "PROTEINUA", "GLUCUA", "KETONESU", "BILIRUBINUA", "OCCULTUA", "NITRITE", "UROBILINOGEN", "LEUKOCYTESUR", "RBCUA", "WBCUA", "BACTERIA", "SQUAMEPITHEL", "HYALINECASTS" in the last 168 hours.    Invalid input(s): "WRIGHTSUR"    Significant Imaging:  All pertinent imaging results/findings from the past 24 hours have been reviewed.                "

## 2024-09-17 NOTE — ADDENDUM NOTE
Addendum  created 09/17/24 1409 by Josselyn Pugh MD    Charge Capture section accepted, Cosign clinical note with attestation

## 2024-09-17 NOTE — PLAN OF CARE
Problem: Physical Therapy  Goal: Physical Therapy Goal  Description: Goals to be met by: 10/1/2024     Patient will increase functional independence with mobility by performin. Supine to sit with Set-up [place]  2. Sit to supine with Set-up [place]  3. Sit to stand transfer with Supervision  4. Bed to chair transfer with Supervision using LRAD  5. Gait  x 200 feet with Supervision using LRAD.   6. Lower extremity exercise program x15 reps per handout, with supervision    Outcome: Progressing

## 2024-09-17 NOTE — NURSING TRANSFER
Nursing Transfer Note      9/16/2024   9:50 PM    Nurse giving handoff:CLINTON Vásquez  Nurse receiving handoff:CLINTON Dao    Reason patient is being transferred: s/p cystectomy    Transfer To: 1012    Transfer via bed    Transfer with 2L NC cardiac monitoring    Transported by transport    Transfer Vital Signs:SEE FLOWSHEET    Telemetry: Box Number [id number]  Order for Tele Monitor? Yes    Medicines sent: infusing     Any special needs or follow-up needed: routine    Patient belongings transferred with patient: No, with family    Chart send with patient: Yes    Notified: spouse    Patient reassessed at: 9/16/24 @2100

## 2024-09-18 LAB
ANION GAP SERPL CALC-SCNC: 4 MMOL/L (ref 8–16)
ANION GAP SERPL CALC-SCNC: 4 MMOL/L (ref 8–16)
ANION GAP SERPL CALC-SCNC: 5 MMOL/L (ref 8–16)
ANION GAP SERPL CALC-SCNC: 5 MMOL/L (ref 8–16)
ANION GAP SERPL CALC-SCNC: 6 MMOL/L (ref 8–16)
ANION GAP SERPL CALC-SCNC: 8 MMOL/L (ref 8–16)
BASOPHILS # BLD AUTO: 0.06 K/UL (ref 0–0.2)
BASOPHILS # BLD AUTO: 0.06 K/UL (ref 0–0.2)
BASOPHILS NFR BLD: 0.4 % (ref 0–1.9)
BASOPHILS NFR BLD: 0.6 % (ref 0–1.9)
BLD PROD TYP BPU: NORMAL
BLD PROD TYP BPU: NORMAL
BLOOD UNIT EXPIRATION DATE: NORMAL
BLOOD UNIT EXPIRATION DATE: NORMAL
BLOOD UNIT TYPE CODE: 600
BLOOD UNIT TYPE CODE: 600
BLOOD UNIT TYPE: NORMAL
BLOOD UNIT TYPE: NORMAL
BUN SERPL-MCNC: 15 MG/DL (ref 8–23)
BUN SERPL-MCNC: 15 MG/DL (ref 8–23)
BUN SERPL-MCNC: 16 MG/DL (ref 8–23)
CALCIUM SERPL-MCNC: 8.7 MG/DL (ref 8.7–10.5)
CALCIUM SERPL-MCNC: 8.7 MG/DL (ref 8.7–10.5)
CALCIUM SERPL-MCNC: 8.8 MG/DL (ref 8.7–10.5)
CALCIUM SERPL-MCNC: 9 MG/DL (ref 8.7–10.5)
CHLORIDE SERPL-SCNC: 104 MMOL/L (ref 95–110)
CHLORIDE SERPL-SCNC: 105 MMOL/L (ref 95–110)
CO2 SERPL-SCNC: 23 MMOL/L (ref 23–29)
CO2 SERPL-SCNC: 23 MMOL/L (ref 23–29)
CO2 SERPL-SCNC: 24 MMOL/L (ref 23–29)
CODING SYSTEM: NORMAL
CODING SYSTEM: NORMAL
CREAT SERPL-MCNC: 1.3 MG/DL (ref 0.5–1.4)
CREAT SERPL-MCNC: 1.4 MG/DL (ref 0.5–1.4)
CROSSMATCH INTERPRETATION: NORMAL
CROSSMATCH INTERPRETATION: NORMAL
DIFFERENTIAL METHOD BLD: ABNORMAL
DIFFERENTIAL METHOD BLD: ABNORMAL
DISPENSE STATUS: NORMAL
DISPENSE STATUS: NORMAL
EOSINOPHIL # BLD AUTO: 0.1 K/UL (ref 0–0.5)
EOSINOPHIL # BLD AUTO: 0.1 K/UL (ref 0–0.5)
EOSINOPHIL NFR BLD: 0.7 % (ref 0–8)
EOSINOPHIL NFR BLD: 0.7 % (ref 0–8)
ERYTHROCYTE [DISTWIDTH] IN BLOOD BY AUTOMATED COUNT: 13.2 % (ref 11.5–14.5)
ERYTHROCYTE [DISTWIDTH] IN BLOOD BY AUTOMATED COUNT: 13.5 % (ref 11.5–14.5)
EST. GFR  (NO RACE VARIABLE): 53.4 ML/MIN/1.73 M^2
EST. GFR  (NO RACE VARIABLE): 58.4 ML/MIN/1.73 M^2
GLUCOSE SERPL-MCNC: 101 MG/DL (ref 70–110)
GLUCOSE SERPL-MCNC: 109 MG/DL (ref 70–110)
GLUCOSE SERPL-MCNC: 109 MG/DL (ref 70–110)
GLUCOSE SERPL-MCNC: 124 MG/DL (ref 70–110)
GLUCOSE SERPL-MCNC: 125 MG/DL (ref 70–110)
GLUCOSE SERPL-MCNC: 126 MG/DL (ref 70–110)
HCT VFR BLD AUTO: 22.1 % (ref 40–54)
HCT VFR BLD AUTO: 33.9 % (ref 40–54)
HGB BLD-MCNC: 10.9 G/DL (ref 14–18)
HGB BLD-MCNC: 6.9 G/DL (ref 14–18)
IMM GRANULOCYTES # BLD AUTO: 0.05 K/UL (ref 0–0.04)
IMM GRANULOCYTES # BLD AUTO: 0.1 K/UL (ref 0–0.04)
IMM GRANULOCYTES NFR BLD AUTO: 0.5 % (ref 0–0.5)
IMM GRANULOCYTES NFR BLD AUTO: 0.6 % (ref 0–0.5)
LYMPHOCYTES # BLD AUTO: 1 K/UL (ref 1–4.8)
LYMPHOCYTES # BLD AUTO: 1.2 K/UL (ref 1–4.8)
LYMPHOCYTES NFR BLD: 7.4 % (ref 18–48)
LYMPHOCYTES NFR BLD: 9.9 % (ref 18–48)
MAGNESIUM SERPL-MCNC: 1.6 MG/DL (ref 1.6–2.6)
MCH RBC QN AUTO: 30.1 PG (ref 27–31)
MCH RBC QN AUTO: 30.1 PG (ref 27–31)
MCHC RBC AUTO-ENTMCNC: 31.2 G/DL (ref 32–36)
MCHC RBC AUTO-ENTMCNC: 32.2 G/DL (ref 32–36)
MCV RBC AUTO: 94 FL (ref 82–98)
MCV RBC AUTO: 97 FL (ref 82–98)
MONOCYTES # BLD AUTO: 0.9 K/UL (ref 0.3–1)
MONOCYTES # BLD AUTO: 1.8 K/UL (ref 0.3–1)
MONOCYTES NFR BLD: 11.2 % (ref 4–15)
MONOCYTES NFR BLD: 8.9 % (ref 4–15)
NEUTROPHILS # BLD AUTO: 13.1 K/UL (ref 1.8–7.7)
NEUTROPHILS # BLD AUTO: 8 K/UL (ref 1.8–7.7)
NEUTROPHILS NFR BLD: 79.4 % (ref 38–73)
NEUTROPHILS NFR BLD: 79.7 % (ref 38–73)
NRBC BLD-RTO: 0 /100 WBC
NRBC BLD-RTO: 0 /100 WBC
NUM UNITS TRANS PACKED RBC: NORMAL
NUM UNITS TRANS PACKED RBC: NORMAL
PHOSPHATE SERPL-MCNC: 2.2 MG/DL (ref 2.7–4.5)
PLATELET # BLD AUTO: 184 K/UL (ref 150–450)
PLATELET # BLD AUTO: 201 K/UL (ref 150–450)
PMV BLD AUTO: 10 FL (ref 9.2–12.9)
PMV BLD AUTO: 10.1 FL (ref 9.2–12.9)
POCT GLUCOSE: 104 MG/DL (ref 70–110)
POCT GLUCOSE: 135 MG/DL (ref 70–110)
POCT GLUCOSE: 142 MG/DL (ref 70–110)
POCT GLUCOSE: 79 MG/DL (ref 70–110)
POTASSIUM SERPL-SCNC: 4.2 MMOL/L (ref 3.5–5.1)
POTASSIUM SERPL-SCNC: 4.3 MMOL/L (ref 3.5–5.1)
POTASSIUM SERPL-SCNC: 4.3 MMOL/L (ref 3.5–5.1)
POTASSIUM SERPL-SCNC: 4.4 MMOL/L (ref 3.5–5.1)
RBC # BLD AUTO: 2.29 M/UL (ref 4.6–6.2)
RBC # BLD AUTO: 3.62 M/UL (ref 4.6–6.2)
SODIUM SERPL-SCNC: 133 MMOL/L (ref 136–145)
SODIUM SERPL-SCNC: 134 MMOL/L (ref 136–145)
SODIUM SERPL-SCNC: 135 MMOL/L (ref 136–145)
WBC # BLD AUTO: 10.02 K/UL (ref 3.9–12.7)
WBC # BLD AUTO: 16.38 K/UL (ref 3.9–12.7)

## 2024-09-18 PROCEDURE — 36415 COLL VENOUS BLD VENIPUNCTURE: CPT | Performed by: UROLOGY

## 2024-09-18 PROCEDURE — 25000003 PHARM REV CODE 250

## 2024-09-18 PROCEDURE — 85025 COMPLETE CBC W/AUTO DIFF WBC: CPT | Mod: 91 | Performed by: STUDENT IN AN ORGANIZED HEALTH CARE EDUCATION/TRAINING PROGRAM

## 2024-09-18 PROCEDURE — 80048 BASIC METABOLIC PNL TOTAL CA: CPT | Mod: 91

## 2024-09-18 PROCEDURE — 36430 TRANSFUSION BLD/BLD COMPNT: CPT

## 2024-09-18 PROCEDURE — 36415 COLL VENOUS BLD VENIPUNCTURE: CPT | Mod: XB | Performed by: STUDENT IN AN ORGANIZED HEALTH CARE EDUCATION/TRAINING PROGRAM

## 2024-09-18 PROCEDURE — 85025 COMPLETE CBC W/AUTO DIFF WBC: CPT | Performed by: STUDENT IN AN ORGANIZED HEALTH CARE EDUCATION/TRAINING PROGRAM

## 2024-09-18 PROCEDURE — 83735 ASSAY OF MAGNESIUM: CPT | Performed by: STUDENT IN AN ORGANIZED HEALTH CARE EDUCATION/TRAINING PROGRAM

## 2024-09-18 PROCEDURE — 86850 RBC ANTIBODY SCREEN: CPT | Performed by: ANESTHESIOLOGY

## 2024-09-18 PROCEDURE — P9016 RBC LEUKOCYTES REDUCED: HCPCS

## 2024-09-18 PROCEDURE — 25000003 PHARM REV CODE 250: Performed by: STUDENT IN AN ORGANIZED HEALTH CARE EDUCATION/TRAINING PROGRAM

## 2024-09-18 PROCEDURE — 25000003 PHARM REV CODE 250: Performed by: UROLOGY

## 2024-09-18 PROCEDURE — 99900035 HC TECH TIME PER 15 MIN (STAT)

## 2024-09-18 PROCEDURE — 20600001 HC STEP DOWN PRIVATE ROOM

## 2024-09-18 PROCEDURE — 63600175 PHARM REV CODE 636 W HCPCS: Performed by: STUDENT IN AN ORGANIZED HEALTH CARE EDUCATION/TRAINING PROGRAM

## 2024-09-18 PROCEDURE — 99231 SBSQ HOSP IP/OBS SF/LOW 25: CPT | Mod: ,,, | Performed by: ANESTHESIOLOGY

## 2024-09-18 PROCEDURE — 27000221 HC OXYGEN, UP TO 24 HOURS

## 2024-09-18 PROCEDURE — A4216 STERILE WATER/SALINE, 10 ML: HCPCS | Performed by: UROLOGY

## 2024-09-18 PROCEDURE — 80048 BASIC METABOLIC PNL TOTAL CA: CPT | Performed by: STUDENT IN AN ORGANIZED HEALTH CARE EDUCATION/TRAINING PROGRAM

## 2024-09-18 PROCEDURE — 36415 COLL VENOUS BLD VENIPUNCTURE: CPT | Mod: XB

## 2024-09-18 PROCEDURE — 94761 N-INVAS EAR/PLS OXIMETRY MLT: CPT

## 2024-09-18 PROCEDURE — 86900 BLOOD TYPING SEROLOGIC ABO: CPT | Performed by: ANESTHESIOLOGY

## 2024-09-18 PROCEDURE — 86901 BLOOD TYPING SEROLOGIC RH(D): CPT | Performed by: ANESTHESIOLOGY

## 2024-09-18 PROCEDURE — 30233N1 TRANSFUSION OF NONAUTOLOGOUS RED BLOOD CELLS INTO PERIPHERAL VEIN, PERCUTANEOUS APPROACH: ICD-10-PCS | Performed by: UROLOGY

## 2024-09-18 PROCEDURE — 84100 ASSAY OF PHOSPHORUS: CPT | Performed by: STUDENT IN AN ORGANIZED HEALTH CARE EDUCATION/TRAINING PROGRAM

## 2024-09-18 RX ORDER — CYCLOBENZAPRINE HCL 5 MG
10 TABLET ORAL EVERY 8 HOURS
Status: DISCONTINUED | OUTPATIENT
Start: 2024-09-18 | End: 2024-09-20 | Stop reason: HOSPADM

## 2024-09-18 RX ORDER — HYDROCODONE BITARTRATE AND ACETAMINOPHEN 500; 5 MG/1; MG/1
TABLET ORAL
Status: DISCONTINUED | OUTPATIENT
Start: 2024-09-18 | End: 2024-09-20 | Stop reason: HOSPADM

## 2024-09-18 RX ORDER — BISACODYL 10 MG/1
10 SUPPOSITORY RECTAL 2 TIMES DAILY
Status: DISCONTINUED | OUTPATIENT
Start: 2024-09-18 | End: 2024-09-20 | Stop reason: HOSPADM

## 2024-09-18 RX ORDER — HYDRALAZINE HYDROCHLORIDE 25 MG/1
25 TABLET, FILM COATED ORAL EVERY 8 HOURS PRN
Status: DISCONTINUED | OUTPATIENT
Start: 2024-09-18 | End: 2024-09-18

## 2024-09-18 RX ORDER — HYDRALAZINE HYDROCHLORIDE 50 MG/1
50 TABLET, FILM COATED ORAL EVERY 8 HOURS PRN
Status: DISCONTINUED | OUTPATIENT
Start: 2024-09-18 | End: 2024-09-20 | Stop reason: HOSPADM

## 2024-09-18 RX ADMIN — ACETAMINOPHEN 650 MG: 325 TABLET ORAL at 12:09

## 2024-09-18 RX ADMIN — ACETAMINOPHEN 650 MG: 325 TABLET ORAL at 05:09

## 2024-09-18 RX ADMIN — CARVEDILOL 12.5 MG: 12.5 TABLET, FILM COATED ORAL at 09:09

## 2024-09-18 RX ADMIN — OXYCODONE HYDROCHLORIDE 5 MG: 5 TABLET ORAL at 09:09

## 2024-09-18 RX ADMIN — CYCLOBENZAPRINE HYDROCHLORIDE 10 MG: 5 TABLET, FILM COATED ORAL at 01:09

## 2024-09-18 RX ADMIN — POLYETHYLENE GLYCOL 3350 17 G: 17 POWDER, FOR SOLUTION ORAL at 09:09

## 2024-09-18 RX ADMIN — HYDRALAZINE HYDROCHLORIDE 50 MG: 50 TABLET ORAL at 05:09

## 2024-09-18 RX ADMIN — GABAPENTIN 300 MG: 300 CAPSULE ORAL at 01:09

## 2024-09-18 RX ADMIN — SODIUM CHLORIDE, POTASSIUM CHLORIDE, SODIUM LACTATE AND CALCIUM CHLORIDE: 600; 310; 30; 20 INJECTION, SOLUTION INTRAVENOUS at 10:09

## 2024-09-18 RX ADMIN — GABAPENTIN 300 MG: 300 CAPSULE ORAL at 05:09

## 2024-09-18 RX ADMIN — GABAPENTIN 300 MG: 300 CAPSULE ORAL at 10:09

## 2024-09-18 RX ADMIN — ACETAMINOPHEN 650 MG: 325 TABLET ORAL at 11:09

## 2024-09-18 RX ADMIN — ONDANSETRON 4 MG: 2 INJECTION INTRAMUSCULAR; INTRAVENOUS at 09:09

## 2024-09-18 RX ADMIN — Medication 10 ML: at 05:09

## 2024-09-18 RX ADMIN — ALVIMOPAN 12 MG: 12 CAPSULE ORAL at 09:09

## 2024-09-18 RX ADMIN — HEPARIN SODIUM 5000 UNITS: 5000 INJECTION INTRAVENOUS; SUBCUTANEOUS at 01:09

## 2024-09-18 RX ADMIN — SODIUM CHLORIDE, POTASSIUM CHLORIDE, SODIUM LACTATE AND CALCIUM CHLORIDE: 600; 310; 30; 20 INJECTION, SOLUTION INTRAVENOUS at 04:09

## 2024-09-18 RX ADMIN — CYCLOBENZAPRINE HYDROCHLORIDE 10 MG: 5 TABLET, FILM COATED ORAL at 10:09

## 2024-09-18 RX ADMIN — HEPARIN SODIUM 5000 UNITS: 5000 INJECTION INTRAVENOUS; SUBCUTANEOUS at 10:09

## 2024-09-18 RX ADMIN — ALVIMOPAN 12 MG: 12 CAPSULE ORAL at 10:09

## 2024-09-18 RX ADMIN — FAMOTIDINE 20 MG: 10 INJECTION, SOLUTION INTRAVENOUS at 09:09

## 2024-09-18 RX ADMIN — HYDRALAZINE HYDROCHLORIDE 25 MG: 25 TABLET ORAL at 11:09

## 2024-09-18 RX ADMIN — OXYCODONE HYDROCHLORIDE 5 MG: 5 TABLET ORAL at 04:09

## 2024-09-18 RX ADMIN — ATORVASTATIN CALCIUM 80 MG: 40 TABLET, FILM COATED ORAL at 09:09

## 2024-09-18 RX ADMIN — CYCLOBENZAPRINE HYDROCHLORIDE 10 MG: 5 TABLET, FILM COATED ORAL at 12:09

## 2024-09-18 RX ADMIN — HEPARIN SODIUM 5000 UNITS: 5000 INJECTION INTRAVENOUS; SUBCUTANEOUS at 05:09

## 2024-09-18 NOTE — ADDENDUM NOTE
Addendum  created 09/18/24 1132 by Josselyn Pugh MD    Charge Capture section accepted, Cosign clinical note with attestation

## 2024-09-18 NOTE — NURSING
Received callback from MD Gomez informing of pt's bp 174/77 with no prn listed. Per MD will place prn order. Awaiting new orders. Monitoring continues.

## 2024-09-18 NOTE — PLAN OF CARE
Problem: Adult Inpatient Plan of Care  Goal: Plan of Care Review  Outcome: Progressing  Goal: Patient-Specific Goal (Individualized)  Outcome: Progressing  Goal: Absence of Hospital-Acquired Illness or Injury  Outcome: Progressing  Goal: Optimal Comfort and Wellbeing  Outcome: Progressing  Goal: Readiness for Transition of Care  Outcome: Progressing     Problem: Infection  Goal: Absence of Infection Signs and Symptoms  Outcome: Progressing     Problem: Wound  Goal: Optimal Coping  Outcome: Progressing  Goal: Optimal Functional Ability  Outcome: Progressing  Goal: Absence of Infection Signs and Symptoms  Outcome: Progressing  Goal: Improved Oral Intake  Outcome: Progressing  Goal: Optimal Pain Control and Function  Outcome: Progressing  Goal: Skin Health and Integrity  Outcome: Progressing  Goal: Optimal Wound Healing  Outcome: Progressing     Problem: Fall Injury Risk  Goal: Absence of Fall and Fall-Related Injury  Outcome: Progressing     Problem: Skin Injury Risk Increased  Goal: Skin Health and Integrity  Outcome: Progressing     Problem: Pain Acute  Goal: Optimal Pain Control and Function  Outcome: Progressing

## 2024-09-18 NOTE — PROGRESS NOTES
Naeem Starks - [facility]  Urology  Progress Note    Patient Name: Rhett Johnson  MRN: [id number]  Admission Date: 9/16/2024  Hospital Length of Stay: 2 days  Code Status: Full Code   Attending Provider: Hari Salguero MD   Primary Care Physician: Sue Campos MD    Subjective:     HPI:  Rhett Johnson is a 73 y.o. male  With [id number] urothelial cell carcinoma of the bladder.       The patient initially presented with prostate cancer and urinary incontinence s/p radical retropubic prostatectomy in 2001. In 07/2024 he presented to the ED with gross hematuria. Subsequent work up revealed asymmetric bladder wall thickening and urachal remnant. Underwent TURBT with bilateral retrograde pyelogram and left ureteral stent placement on 7/30/24. Path returned as HGMIBC.      Upper tract imaging (modality bilateral retrograde pyelogram, 07/30/24) - There was left hydroureteronephrosis down to the level of the bladder.  This was grade 3 hydronephrosis.  The distal ureter was evaluated with the semi rigid 6.8 [place] self-dilating Olympus ureteroscope.  There was no evidence of tumor involvement of the ureteral mucosa.      TURBT (07/30/24) - Urinary bladder, transurethral resection of bladder tumor (TURBT):   - High-grade papillary urothelial carcinoma   - Muscularis propria is present and is involved by carcinoma   - Lymphovascular and perineural invasion are identified   - See synoptic report below   - Mismatch repair protein testing is pending and results will be reported in a supplemental report      CT C/A/P-8/6/2024- no nodes or metastasis.  Bone scan-8/14/2024- no osseous metastasis.  Creatinine-1.9 (eGFR 37), albumin 2.7.     Due to renal function not felt to be a candidate for platinum based chemotherapy.    Interval History: No acute events overnight. He became mildly hypertensive. He reports that he is belching and is yet to pass flatus. Yesterday he was able to ambulate within his room and was in the  "chair for three hours. Hemoglobin down-trended however no signs of active bleed.     Review of Systems  Objective:     Temp:  [97.8 °F (36.6 °C)-98.7 °F (37.1 °C)] 98.5 °F (36.9 °C)  Pulse:  [] 99  Resp:  [18-20] 18  SpO2:  [89 %-100 %] 89 %  BP: (125-174)/(52-77) 161/60     Body mass index is 24.39 kg/m².           Drains       Drain  Duration                  Closed/Suction Drain 09/16/24 1751 Left Abdomen Bulb 19 Fr. <1 day         Ureteral Drain/Stent 09/16/24 1652 Left ureter 7.2 Fr. <1 day         Ureteral Drain/Stent 09/16/24 1652 Right ureter 7.2 Fr. <1 day         Urostomy 09/16/24 1751 ileal conduit RUQ <1 day                     Physical Exam  Constitutional:       Appearance: Normal appearance.   HENT:      Head: Normocephalic and atraumatic.   Abdominal:      General: Abdomen is flat. There is distension.      Palpations: Abdomen is soft.      Tenderness: There is no guarding or rebound.      Comments: Ostomy is healthy appearing covered in appliance.   Stents in place.  LLQ IVANIA draining SS output.   Incisions are clean dry and intact.   Skin:     General: Skin is warm.   Neurological:      General: No focal deficit present.      Mental Status: He is alert and oriented to person, place, and time.   Psychiatric:         Mood and Affect: Mood normal.           Significant Labs:    BMP:  Recent Labs   Lab 09/17/24  1515 09/17/24  1905 09/18/24  0209   * 132* 133*  133*   K 4.9 4.3 4.4  4.4    105 105  105   CO2 19* 19* 24  24   BUN 18 17 15  15   CREATININE 1.6* 1.6* 1.4  1.4   CALCIUM 8.9 8.8 8.7  8.7       CBC:   Recent Labs   Lab 09/16/24  1855 09/17/24  0504 09/18/24  0209   WBC 15.94* 13.18* 10.02   HGB 9.7* 8.6* 6.9*   HCT 32.1* 28.9* 22.1*    265 201       Blood Culture: No results for input(s): "LABBLOO" in the last 168 hours.  Urine Culture: No results for input(s): "LABURIN" in the last 168 hours.  Urine Studies: No results for input(s): "COLORU", "APPEARANCEUA", " ""PHUR", "SPECGRAV", "PROTEINUA", "GLUCUA", "KETONESU", "BILIRUBINUA", "OCCULTUA", "NITRITE", "UROBILINOGEN", "LEUKOCYTESUR", "RBCUA", "WBCUA", "BACTERIA", "SQUAMEPITHEL", "HYALINECASTS" in the last 168 hours.    Invalid input(s): "LANASUR"    Significant Imaging:  All pertinent imaging results/findings from the past 24 hours have been reviewed.                  Assessment/Plan:     Malignant neoplasm of overlapping sites of bladder  Monty Johnson is a 73 year old who is s/p open radical cystectomy and ileal conduit creation.     - Hemoglobin 6.9, will transfuse two units  - KUB for abdominal distension   - Walker ordered   - Diet: Clear Liquid Diet   - Multimodal pain control   - Bowel prep: Miralax   - PT/OT consulted   - Wound care consulted   - Encourage ambulation   - Encourage incentive spirometer usage   - Delirium precautions    Dispo - Continue inpatient care         VTE Risk Mitigation (From admission, onward)           Ordered     heparin (porcine) injection 5,000 Units  Every 8 hours         09/16/24 1818     IP VTE HIGH RISK PATIENT  Once         09/16/24 1818     Place sequential compression device  Until discontinued         09/16/24 1818                    Rashim Gomez MD  Urology  [facility]  "

## 2024-09-18 NOTE — ASSESSMENT & PLAN NOTE
Monty Johnson is a 73 year old who is s/p open radical cystectomy and ileal conduit creation.     - Hemoglobin 6.9, will transfuse two units  - KUB for abdominal distension   - Walker ordered   - Diet: Clear Liquid Diet   - Multimodal pain control   - Bowel prep: Miralax   - PT/OT consulted   - Wound care consulted   - Encourage ambulation   - Encourage incentive spirometer usage   - Delirium precautions    Dispo - Continue inpatient care

## 2024-09-18 NOTE — PLAN OF CARE
Brief Consult Note    Inpatient consult to Hospital Medicine-General  Consult performed by: Jeison Dorman MD  Consult ordered by: Star Pena MD        The patient is a 73 year old male with a history of HTN, COPD, HLD, prostate Ca, CVA, GERD, CAD, CKD III, neuropathy who is post-op day 2 following radical prostatectomy with bilateral pelvic node dissection and ileal conduit. Medicine consulted for blood pressure management recommendations.    The patient's home regimen includes coreg 12.5 mg BID (started due to history of PVC's noted on cardiac monitor). After addition of the beta-blocker his home BP was noted to be low on both the coreg and Nifedipine 60 mg daily that he had previously been on. The Nifedipine was stopped and he was continued on Coreg. The patient's wife provides the added information that at home his typical blood pressure is about 130-160 systolic and 70-80 diastolic.    At the time of evaluation the patient's BP is 203/88 and 190/76 on recheck. He reports abdominal pain from the surgery and received PRN oxycodone 5 mg immediately after BP check. Suspect pain is the primary  of his blood pressure elevation at this time, however analgesic options are limited due to concern of developing ileus on abdominal X-ray. Suspect that once pain is improved that he will not need long-term escalated doses of his antihypertensive regimen. Will plan for short-acting PO hydralazine in the interim with small increases in home regimen if needed for additional BP control.    -- Aggressive pain control per primary  -- Recommend PO hydralazine 50 mg q8h PRN for BP elevated despite maximum tolerated pain control regimen  -- IF BP remains > 180 systolic after PRN 50 mg PO hydralazine this evening then okay to change Coreg to BID (giving him 12.5 mg tonight)  -- Recommend stopping continuous IVF once safe from surgical perspective      Plan discussed with staff physician, Dr. Wong. Full consult note to  follow on 9/19.      Jeison Dorman MD  Department of Internal Medicine PGY 3  4:55 PM   09/18/2024

## 2024-09-18 NOTE — SUBJECTIVE & OBJECTIVE
Interval History: No acute events overnight. He became mildly hypertensive. He reports that he is belching and is yet to pass flatus. Yesterday he was able to ambulate within his room and was in the chair for three hours. Hemoglobin down-trended however no signs of active bleed.     Review of Systems  Objective:     Temp:  [97.8 °F (36.6 °C)-98.7 °F (37.1 °C)] 98.5 °F (36.9 °C)  Pulse:  [] 99  Resp:  [18-20] 18  SpO2:  [89 %-100 %] 89 %  BP: (125-174)/(52-77) 161/60     Body mass index is 24.39 kg/m².           Drains       Drain  Duration                  Closed/Suction Drain 09/16/24 1751 Left Abdomen Bulb 19 Fr. <1 day         Ureteral Drain/Stent 09/16/24 1652 Left ureter 7.2 Fr. <1 day         Ureteral Drain/Stent 09/16/24 1652 Right ureter 7.2 Fr. <1 day         Urostomy 09/16/24 1751 ileal conduit RUQ <1 day                     Physical Exam  Constitutional:       Appearance: Normal appearance.   HENT:      Head: Normocephalic and atraumatic.   Abdominal:      General: Abdomen is flat. There is distension.      Palpations: Abdomen is soft.      Tenderness: There is no guarding or rebound.      Comments: Ostomy is healthy appearing covered in appliance.   Stents in place.  LLQ IVANIA draining SS output.   Incisions are clean dry and intact.   Skin:     General: Skin is warm.   Neurological:      General: No focal deficit present.      Mental Status: He is alert and oriented to person, place, and time.   Psychiatric:         Mood and Affect: Mood normal.           Significant Labs:    BMP:  Recent Labs   Lab 09/17/24  1515 09/17/24  1905 09/18/24  0209   * 132* 133*  133*   K 4.9 4.3 4.4  4.4    105 105  105   CO2 19* 19* 24  24   BUN 18 17 15  15   CREATININE 1.6* 1.6* 1.4  1.4   CALCIUM 8.9 8.8 8.7  8.7       CBC:   Recent Labs   Lab 09/16/24  1855 09/17/24  0504 09/18/24  0209   WBC 15.94* 13.18* 10.02   HGB 9.7* 8.6* 6.9*   HCT 32.1* 28.9* 22.1*    265 201       Blood Culture: No  "results for input(s): "LABBLOO" in the last 168 hours.  Urine Culture: No results for input(s): "LABURIN" in the last 168 hours.  Urine Studies: No results for input(s): "COLORU", "APPEARANCEUA", "PHUR", "SPECGRAV", "PROTEINUA", "GLUCUA", "KETONESU", "BILIRUBINUA", "OCCULTUA", "NITRITE", "UROBILINOGEN", "LEUKOCYTESUR", "RBCUA", "WBCUA", "BACTERIA", "SQUAMEPITHEL", "HYALINECASTS" in the last 168 hours.    Invalid input(s): "WRIGHTSUR"    Significant Imaging:  All pertinent imaging results/findings from the past 24 hours have been reviewed.                "

## 2024-09-18 NOTE — NURSING
[facility]  Wound Care    Patient Name:  Rhett Johnson   MRN:  [id number]  Date: 9/18/2024  Diagnosis: <principal problem not specified>    History:     Past Medical History:   Diagnosis Date    Anemia 09/09/2024    Asthma     Cancer     Carotid occlusion, left     100% blockage    COPD (chronic obstructive pulmonary disease)     Current tobacco use 08/20/2018    Deep vein thrombosis     Disorder of kidney and ureter     DVT (deep venous thrombosis)     Elevated WBC count 09/10/2024    Gross hematuria 07/30/2024    Hx of hepatitis     Hyperlipidemia     Hypertension     on medication monitoring    Hypertensive heart and chronic kidney disease with heart failure and stage 1 through stage 4 chronic kidney disease, or unspecified chronic kidney disease 08/29/2024    Documented on 01/03/2023      Peripheral artery disease     Prostate cancer 2001    Dr. Bai        Social History     Socioeconomic History    Marital status:      Spouse name: Tammie   Tobacco Use    Smoking status: Former     Current packs/day: 1.00     Average packs/day: 1 pack/day for 51.0 years (51.0 ttl pk-yrs)     Types: Cigarettes    Smokeless tobacco: Never    Tobacco comments:     Smoked about 50-55 years about 1 pack per day.  Quit October 20, 2020.     Patient aware of smoking cessation program and aware of health risk due to smoking.    Substance and Sexual Activity    Alcohol use: Not Currently    Drug use: Never    Sexual activity: Not Currently     Partners: Female   Social History Narrative    Lives in [place] with his wife. He is retired from [employer]. 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      Social Determinants of Health     Financial Resource Strain: Low Risk  (9/17/2024)    Overall Financial Resource Strain (CARDIA)     Difficulty of Paying Living Expenses: Not hard at all   Food Insecurity: No Food Insecurity (9/17/2024)    Hunger Vital Sign      Worried About Running Out of Food in the Last Year: Never true     Ran Out of Food in the Last Year: Never true   Transportation Needs: No Transportation Needs (9/17/2024)    TRANSPORTATION NEEDS     Transportation : No   Physical Activity: Inactive (9/17/2024)    Exercise Vital Sign     Days of Exercise per Week: 0 days     Minutes of Exercise per Session: 0 min   Stress: No Stress Concern Present (9/17/2024)    [employer] [place] [employer] - Occupational Stress Questionnaire     Feeling of Stress : Not at all   Housing Stability: Low Risk  (9/17/2024)    Housing Stability Vital Sign     Unable to Pay for Housing in the Last Year: No     Homeless in the Last Year: No       Precautions:     Allergies as of 08/22/2024 - Reviewed 08/22/2024   Allergen Reaction Noted    Pcn [penicillins] Anaphylaxis 03/07/2016    Valium [diazepam] Anaphylaxis 01/11/2024       WOC Assessment Details/Treatment     Attempted to see pt for continued urostomy care.  Chart reviewed for this encounter.    Pt found lying in bed w/ family at bedside. Offered to provide pt w/ urostomy pouch change lesson either on himself or on practice board. Pt politely refused at this time, states he wants to wait for his wife who will be primarily providing care for his urostomy at home.   Wife expected to come in tomorrow morning around 830/9 am, WC will tentatively plan to meet w/ pt and wife tomorrow AM for lesson.

## 2024-09-18 NOTE — PT/OT/SLP PROGRESS
Physical Therapy      Patient Name:  Rhett Johnson   MRN:  [id number]    Patient not seen today secondary to Pain, Patient fatigue, Blood transfusion (pt in pain and receiving blood in AM, just got back to bed in PM and is fatigued). Will follow-up 9/19.

## 2024-09-18 NOTE — ANESTHESIA POST-OP PAIN MANAGEMENT
Acute Pain Service Progress Note    Rhett Johnson is a 73 y.o., male, [id number].    Surgery:  Radical cystectomy with bilateral pelvic node dissection and ileal conduit.     Post Op Day #: 2    Catheter type: perineural  Bilateral CARLO catheters    Infusion type: Ropivacaine 0.2%  15cc/3hr basal    Problem List:    Active Hospital Problems    Diagnosis  POA    Malignant neoplasm of overlapping sites of bladder [C67.8]  Yes      Resolved Hospital Problems   No resolved problems to display.       Subjective:     General appearance of alert, oriented, no complaints   Pain with rest: 1    Faces   Pain with movement: 8    Faces   Side Effects    1. Pruritis No    2. Nausea No  3. Motor Blockade No, 0=Ability to raise lower extremities off bed    4. Sedation No, 1=awake and alert    Objective:     Catheter sites clean, dry, intact         Vitals   Vitals:    09/18/24 0933   BP:    Pulse:    Resp: 18   Temp:         Labs    No results displayed because visit has over 200 results.           Meds   Current Facility-Administered Medications   Medication Dose Route Frequency Provider Last Rate Last Admin    0.9%  NaCl infusion (for blood administration)   Intravenous Q24H PRN Rashmi Gomez MD        acetaminophen tablet 650 mg  650 mg Oral Q6H Elida Rojo MD   650 mg at 09/18/24 0527    alvimopan capsule 12 mg  12 mg Oral BID Elida Rojo MD   12 mg at 09/18/24 0916    atorvastatin tablet 80 mg  80 mg Oral Daily Elida Rojo MD   80 mg at 09/18/24 0917    bisacodyL suppository 10 mg  10 mg Rectal BID Rashmi Gomez MD        carvediloL tablet 12.5 mg  12.5 mg Oral Daily Elida Rojo MD   12.5 mg at 09/18/24 0916    cyclobenzaprine tablet 10 mg  10 mg Oral Q8H Nael Roach MD        dextrose 10% bolus 125 mL 125 mL  12.5 g Intravenous PRN Lianne Talley MD        dextrose 10% bolus 250 mL 250 mL  25 g Intravenous PRN Lianne Talley MD        diphenhydrAMINE injection  12.5 mg  12.5 mg Intravenous Q6H PRN Elida Rojo MD        famotidine (PF) injection 20 mg  20 mg Intravenous Daily Hari Salguero MD   20 mg at 09/18/24 0917    gabapentin capsule 300 mg  300 mg Oral Q8H Rodger Giraldo DO   300 mg at 09/18/24 0527    heparin (porcine) injection 5,000 Units  5,000 Units Subcutaneous Q8H Elida Rojo MD   5,000 Units at 09/18/24 0528    hydrALAZINE tablet 25 mg  25 mg Oral Q8H PRN Rashmi Gomez MD        lactated ringers infusion   Intravenous Continuous Elida Rojo  mL/hr at 09/18/24 0410 New Bag at 09/18/24 0410    ondansetron injection 4 mg  4 mg Intravenous Q8H PRN Elida Rojo MD   4 mg at 09/18/24 0942    oxyCODONE immediate release tablet 5 mg  5 mg Oral Q4H PRN Elida Rojo MD   5 mg at 09/18/24 0933    polyethylene glycol packet 17 g  17 g Oral Daily Elida Rojo MD   17 g at 09/18/24 0917    prochlorperazine injection Soln 5 mg  5 mg Intravenous Q6H PRN Elida Rojo MD        ropivacaine 0.2% Perineural Pump infusion 500 ML   Perineural Continuous Elida Rojo MD   New Bag at 09/16/24 1919    ropivacaine 0.2% Perineural Pump infusion 500 ML   Perineural Continuous Elida Rojo MD   New Bag at 09/16/24 1920    senna-docusate 8.6-50 mg per tablet 1 tablet  1 tablet Oral Daily PRN Cathy Rome MD        sodium chloride 0.9% flush 10 mL  10 mL Intravenous Q6H Hari Salguero MD   10 mL at 09/17/24 1739    And    sodium chloride 0.9% flush 10 mL  10 mL Intravenous PRN Hari Salguero MD            Anticoagulant dose Heparin 5,000 units every 8 hours.    Assessment:  Pain control adequate. Patient given prn flexeril 2x and oxycodone 5 mg once. 10 mL bolus given through PNCs during rounds. Patient says he has minimal pain when sitting still, but that his pain severely increases with movement. He also notes intermittent cramping pain in his abdomen.     Plan:    Modify present  therapy to improve control of pain by changing flexeril to scheduled (home med) and will trial an abdominal binder to reduce his pain with movement.    1.) Continue Bilateral CARLO PNC infusions of Ropivacaine 0.2% at 15cc/3hr.    2.) Multimodal Pain Regimen   - Tylenol 650 mg q6h    - Gabapentin 300 mg q8h    - Flexeril 10 mg q8h now scheduled     - Oxycodone 5 mg q4h PRN for moderate pain and severe pain    3.) Abdominal Binder ordered to add abdominal support and reduce his pain with movement      4.) Bowel Regimen  - Miralax daily and PRN senna in setting of opioids to avoid constipation     Case discussed with staff, Dr. Pugh; final recommendations per attestation above.     Thank you for your consult and allowing us to participate in the care of this patient. We will continue to follow along. Please call the Acute Pain Service at [phone] or Anesthesia at [phone] if you have any questions or concerns.     Nael Roach MD (PGY1)  Anesthesiology   [facility]

## 2024-09-18 NOTE — PLAN OF CARE
[facility] Plan of Care Note  Dx Final diagnoses:  [D49.4] Bladder tumor     Shift Events Patient received 2 U PRBC's, awaiting repeat CBC at this time.  Patient also seemed more alert and oriented during day shift compared to the previous night.  Electronic fall monitoring system discontinued at this time.  Delirium precautions in place.  Patient OOB to chair for most of the day.  Attempted to have a bowel movement in the bathroom, but refused rectal suppository.    Neuro: AAOx4    Vital Signs:   Vitals:    09/18/24 1611   BP: (!) 190/76   Pulse: 99   Resp: 18   Temp:     Medicine team consulted for management of HTN, PRN hydralazine increased from 25 to 50 mg    Respiratory: RA     Diet: CLEAR    Urine Output/Bowel Movement: SEE LDA           Problem: Adult Inpatient Plan of Care  Goal: Plan of Care Review  Outcome: Progressing  Goal: Patient-Specific Goal (Individualized)  Outcome: Progressing  Goal: Absence of Hospital-Acquired Illness or Injury  Outcome: Progressing  Goal: Optimal Comfort and Wellbeing  Outcome: Progressing  Goal: Readiness for Transition of Care  Outcome: Progressing     Problem: Infection  Goal: Absence of Infection Signs and Symptoms  Outcome: Progressing     Problem: Wound  Goal: Optimal Coping  Outcome: Progressing  Goal: Optimal Functional Ability  Outcome: Progressing  Goal: Absence of Infection Signs and Symptoms  Outcome: Progressing  Goal: Improved Oral Intake  Outcome: Progressing  Goal: Optimal Pain Control and Function  Outcome: Progressing  Goal: Skin Health and Integrity  Outcome: Progressing  Goal: Optimal Wound Healing  Outcome: Progressing     Problem: Fall Injury Risk  Goal: Absence of Fall and Fall-Related Injury  Outcome: Progressing     Problem: Skin Injury Risk Increased  Goal: Skin Health and Integrity  Outcome: Progressing     Problem: Pain Acute  Goal: Optimal Pain Control and Function  Outcome: Progressing

## 2024-09-19 PROBLEM — I10 WHITE COAT SYNDROME WITH DIAGNOSIS OF HYPERTENSION: Status: ACTIVE | Noted: 2024-09-19

## 2024-09-19 PROBLEM — I16.0 HYPERTENSIVE URGENCY: Status: ACTIVE | Noted: 2024-09-19

## 2024-09-19 PROBLEM — I49.3 PVCS (PREMATURE VENTRICULAR CONTRACTIONS): Status: ACTIVE | Noted: 2024-09-19

## 2024-09-19 LAB
ANION GAP SERPL CALC-SCNC: 6 MMOL/L (ref 8–16)
ANION GAP SERPL CALC-SCNC: 7 MMOL/L (ref 8–16)
ANION GAP SERPL CALC-SCNC: 8 MMOL/L (ref 8–16)
ANION GAP SERPL CALC-SCNC: 9 MMOL/L (ref 8–16)
BASOPHILS # BLD AUTO: 0.04 K/UL (ref 0–0.2)
BASOPHILS NFR BLD: 0.3 % (ref 0–1.9)
BUN SERPL-MCNC: 17 MG/DL (ref 8–23)
BUN SERPL-MCNC: 17 MG/DL (ref 8–23)
BUN SERPL-MCNC: 18 MG/DL (ref 8–23)
BUN SERPL-MCNC: 19 MG/DL (ref 8–23)
BUN SERPL-MCNC: 19 MG/DL (ref 8–23)
CALCIUM SERPL-MCNC: 8.5 MG/DL (ref 8.7–10.5)
CALCIUM SERPL-MCNC: 8.5 MG/DL (ref 8.7–10.5)
CALCIUM SERPL-MCNC: 8.7 MG/DL (ref 8.7–10.5)
CALCIUM SERPL-MCNC: 8.7 MG/DL (ref 8.7–10.5)
CALCIUM SERPL-MCNC: 9 MG/DL (ref 8.7–10.5)
CALCIUM SERPL-MCNC: 9 MG/DL (ref 8.7–10.5)
CALCIUM SERPL-MCNC: 9.1 MG/DL (ref 8.7–10.5)
CHLORIDE SERPL-SCNC: 102 MMOL/L (ref 95–110)
CHLORIDE SERPL-SCNC: 103 MMOL/L (ref 95–110)
CHLORIDE SERPL-SCNC: 104 MMOL/L (ref 95–110)
CHLORIDE SERPL-SCNC: 104 MMOL/L (ref 95–110)
CHLORIDE SERPL-SCNC: 105 MMOL/L (ref 95–110)
CO2 SERPL-SCNC: 22 MMOL/L (ref 23–29)
CO2 SERPL-SCNC: 23 MMOL/L (ref 23–29)
CO2 SERPL-SCNC: 24 MMOL/L (ref 23–29)
CREAT SERPL-MCNC: 1.2 MG/DL (ref 0.5–1.4)
CREAT SERPL-MCNC: 1.3 MG/DL (ref 0.5–1.4)
DIFFERENTIAL METHOD BLD: ABNORMAL
EOSINOPHIL # BLD AUTO: 0.1 K/UL (ref 0–0.5)
EOSINOPHIL NFR BLD: 1.1 % (ref 0–8)
ERYTHROCYTE [DISTWIDTH] IN BLOOD BY AUTOMATED COUNT: 13.8 % (ref 11.5–14.5)
EST. GFR  (NO RACE VARIABLE): 58.4 ML/MIN/1.73 M^2
EST. GFR  (NO RACE VARIABLE): >60 ML/MIN/1.73 M^2
GLUCOSE SERPL-MCNC: 108 MG/DL (ref 70–110)
GLUCOSE SERPL-MCNC: 109 MG/DL (ref 70–110)
GLUCOSE SERPL-MCNC: 113 MG/DL (ref 70–110)
GLUCOSE SERPL-MCNC: 113 MG/DL (ref 70–110)
GLUCOSE SERPL-MCNC: 124 MG/DL (ref 70–110)
GLUCOSE SERPL-MCNC: 138 MG/DL (ref 70–110)
GLUCOSE SERPL-MCNC: 162 MG/DL (ref 70–110)
HCT VFR BLD AUTO: 29.3 % (ref 40–54)
HGB BLD-MCNC: 9.5 G/DL (ref 14–18)
IMM GRANULOCYTES # BLD AUTO: 0.07 K/UL (ref 0–0.04)
IMM GRANULOCYTES NFR BLD AUTO: 0.6 % (ref 0–0.5)
LYMPHOCYTES # BLD AUTO: 1.5 K/UL (ref 1–4.8)
LYMPHOCYTES NFR BLD: 12.5 % (ref 18–48)
MAGNESIUM SERPL-MCNC: 1.5 MG/DL (ref 1.6–2.6)
MCH RBC QN AUTO: 29 PG (ref 27–31)
MCHC RBC AUTO-ENTMCNC: 32.4 G/DL (ref 32–36)
MCV RBC AUTO: 89 FL (ref 82–98)
MONOCYTES # BLD AUTO: 0.9 K/UL (ref 0.3–1)
MONOCYTES NFR BLD: 7.4 % (ref 4–15)
NEUTROPHILS # BLD AUTO: 9.4 K/UL (ref 1.8–7.7)
NEUTROPHILS NFR BLD: 78.1 % (ref 38–73)
NRBC BLD-RTO: 0 /100 WBC
PHOSPHATE SERPL-MCNC: 2 MG/DL (ref 2.7–4.5)
PLATELET # BLD AUTO: 195 K/UL (ref 150–450)
PMV BLD AUTO: 10.2 FL (ref 9.2–12.9)
POCT GLUCOSE: 131 MG/DL (ref 70–110)
POCT GLUCOSE: 133 MG/DL (ref 70–110)
POCT GLUCOSE: 136 MG/DL (ref 70–110)
POTASSIUM SERPL-SCNC: 4 MMOL/L (ref 3.5–5.1)
POTASSIUM SERPL-SCNC: 4.2 MMOL/L (ref 3.5–5.1)
POTASSIUM SERPL-SCNC: 4.3 MMOL/L (ref 3.5–5.1)
POTASSIUM SERPL-SCNC: 4.8 MMOL/L (ref 3.5–5.1)
RBC # BLD AUTO: 3.28 M/UL (ref 4.6–6.2)
SODIUM SERPL-SCNC: 134 MMOL/L (ref 136–145)
SODIUM SERPL-SCNC: 134 MMOL/L (ref 136–145)
SODIUM SERPL-SCNC: 135 MMOL/L (ref 136–145)
WBC # BLD AUTO: 12.05 K/UL (ref 3.9–12.7)

## 2024-09-19 PROCEDURE — 25000003 PHARM REV CODE 250

## 2024-09-19 PROCEDURE — 83735 ASSAY OF MAGNESIUM: CPT | Performed by: STUDENT IN AN ORGANIZED HEALTH CARE EDUCATION/TRAINING PROGRAM

## 2024-09-19 PROCEDURE — 85025 COMPLETE CBC W/AUTO DIFF WBC: CPT | Performed by: STUDENT IN AN ORGANIZED HEALTH CARE EDUCATION/TRAINING PROGRAM

## 2024-09-19 PROCEDURE — A4216 STERILE WATER/SALINE, 10 ML: HCPCS | Performed by: UROLOGY

## 2024-09-19 PROCEDURE — 63600175 PHARM REV CODE 636 W HCPCS: Performed by: STUDENT IN AN ORGANIZED HEALTH CARE EDUCATION/TRAINING PROGRAM

## 2024-09-19 PROCEDURE — 25000003 PHARM REV CODE 250: Performed by: UROLOGY

## 2024-09-19 PROCEDURE — 97530 THERAPEUTIC ACTIVITIES: CPT | Mod: CQ

## 2024-09-19 PROCEDURE — 36415 COLL VENOUS BLD VENIPUNCTURE: CPT | Mod: XB

## 2024-09-19 PROCEDURE — 63600175 PHARM REV CODE 636 W HCPCS

## 2024-09-19 PROCEDURE — 84100 ASSAY OF PHOSPHORUS: CPT | Performed by: STUDENT IN AN ORGANIZED HEALTH CARE EDUCATION/TRAINING PROGRAM

## 2024-09-19 PROCEDURE — 36415 COLL VENOUS BLD VENIPUNCTURE: CPT | Performed by: STUDENT IN AN ORGANIZED HEALTH CARE EDUCATION/TRAINING PROGRAM

## 2024-09-19 PROCEDURE — 99231 SBSQ HOSP IP/OBS SF/LOW 25: CPT | Mod: ,,, | Performed by: ANESTHESIOLOGY

## 2024-09-19 PROCEDURE — 25000003 PHARM REV CODE 250: Performed by: STUDENT IN AN ORGANIZED HEALTH CARE EDUCATION/TRAINING PROGRAM

## 2024-09-19 PROCEDURE — 80048 BASIC METABOLIC PNL TOTAL CA: CPT | Performed by: STUDENT IN AN ORGANIZED HEALTH CARE EDUCATION/TRAINING PROGRAM

## 2024-09-19 PROCEDURE — 97116 GAIT TRAINING THERAPY: CPT | Mod: CQ

## 2024-09-19 PROCEDURE — 20600001 HC STEP DOWN PRIVATE ROOM

## 2024-09-19 PROCEDURE — 80048 BASIC METABOLIC PNL TOTAL CA: CPT | Mod: 91

## 2024-09-19 RX ORDER — CARVEDILOL 25 MG/1
25 TABLET ORAL 2 TIMES DAILY
Status: DISCONTINUED | OUTPATIENT
Start: 2024-09-19 | End: 2024-09-20 | Stop reason: HOSPADM

## 2024-09-19 RX ORDER — CARVEDILOL 12.5 MG/1
12.5 TABLET ORAL ONCE
Status: DISCONTINUED | OUTPATIENT
Start: 2024-09-19 | End: 2024-09-19

## 2024-09-19 RX ORDER — MAGNESIUM SULFATE HEPTAHYDRATE 40 MG/ML
2 INJECTION, SOLUTION INTRAVENOUS ONCE
Status: COMPLETED | OUTPATIENT
Start: 2024-09-19 | End: 2024-09-19

## 2024-09-19 RX ORDER — ACETAMINOPHEN 500 MG
1000 TABLET ORAL EVERY 8 HOURS
Status: DISCONTINUED | OUTPATIENT
Start: 2024-09-19 | End: 2024-09-20 | Stop reason: HOSPADM

## 2024-09-19 RX ADMIN — SODIUM CHLORIDE, POTASSIUM CHLORIDE, SODIUM LACTATE AND CALCIUM CHLORIDE: 600; 310; 30; 20 INJECTION, SOLUTION INTRAVENOUS at 06:09

## 2024-09-19 RX ADMIN — GABAPENTIN 300 MG: 300 CAPSULE ORAL at 09:09

## 2024-09-19 RX ADMIN — ATORVASTATIN CALCIUM 80 MG: 40 TABLET, FILM COATED ORAL at 07:09

## 2024-09-19 RX ADMIN — Medication 10 ML: at 06:09

## 2024-09-19 RX ADMIN — HEPARIN SODIUM 5000 UNITS: 5000 INJECTION INTRAVENOUS; SUBCUTANEOUS at 06:09

## 2024-09-19 RX ADMIN — HEPARIN SODIUM 5000 UNITS: 5000 INJECTION INTRAVENOUS; SUBCUTANEOUS at 09:09

## 2024-09-19 RX ADMIN — HEPARIN SODIUM 5000 UNITS: 5000 INJECTION INTRAVENOUS; SUBCUTANEOUS at 02:09

## 2024-09-19 RX ADMIN — GABAPENTIN 300 MG: 300 CAPSULE ORAL at 06:09

## 2024-09-19 RX ADMIN — SODIUM PHOSPHATE, MONOBASIC, MONOHYDRATE AND SODIUM PHOSPHATE, DIBASIC, ANHYDROUS 20.01 MMOL: 142; 276 INJECTION, SOLUTION INTRAVENOUS at 09:09

## 2024-09-19 RX ADMIN — ACETAMINOPHEN 1000 MG: 500 TABLET ORAL at 09:09

## 2024-09-19 RX ADMIN — ACETAMINOPHEN 650 MG: 325 TABLET ORAL at 06:09

## 2024-09-19 RX ADMIN — CYCLOBENZAPRINE HYDROCHLORIDE 10 MG: 5 TABLET, FILM COATED ORAL at 09:09

## 2024-09-19 RX ADMIN — CARVEDILOL 25 MG: 25 TABLET, FILM COATED ORAL at 09:09

## 2024-09-19 RX ADMIN — HYDRALAZINE HYDROCHLORIDE 50 MG: 50 TABLET ORAL at 07:09

## 2024-09-19 RX ADMIN — Medication 10 ML: at 12:09

## 2024-09-19 RX ADMIN — CARVEDILOL 12.5 MG: 12.5 TABLET, FILM COATED ORAL at 07:09

## 2024-09-19 RX ADMIN — ACETAMINOPHEN 1000 MG: 500 TABLET ORAL at 02:09

## 2024-09-19 RX ADMIN — CYCLOBENZAPRINE HYDROCHLORIDE 10 MG: 5 TABLET, FILM COATED ORAL at 06:09

## 2024-09-19 RX ADMIN — FAMOTIDINE 20 MG: 10 INJECTION, SOLUTION INTRAVENOUS at 07:09

## 2024-09-19 RX ADMIN — MAGNESIUM SULFATE HEPTAHYDRATE 2 G: 40 INJECTION, SOLUTION INTRAVENOUS at 07:09

## 2024-09-19 RX ADMIN — HYDRALAZINE HYDROCHLORIDE 50 MG: 50 TABLET ORAL at 09:09

## 2024-09-19 RX ADMIN — GABAPENTIN 300 MG: 300 CAPSULE ORAL at 02:09

## 2024-09-19 RX ADMIN — CYCLOBENZAPRINE HYDROCHLORIDE 10 MG: 5 TABLET, FILM COATED ORAL at 02:09

## 2024-09-19 NOTE — ASSESSMENT & PLAN NOTE
Monty Johnson is a 73 year old who is s/p open radical cystectomy and ileal conduit creation.     - Hemoglobin stable at 9.5 s/p two units of pRBC yesterday morning   - BID suppositories   - Walker ordered   - Diet: Clear Liquid Diet   - Multimodal pain control   - Bowel prep: Miralax, Suppositories, Senna-docusate   - PT/OT consulted, he was not seen yesterday due to pain and was receiving blood at the time   - Wound care consulted   - Ambulation in the halls x3   - Encourage incentive spirometer usage   - Delirium precautions    Dispo - Continue inpatient care

## 2024-09-19 NOTE — PLAN OF CARE
[facility]  Discharge Reassessment    Primary Care Provider: Sue Campos MD    Expected Discharge Date: 9/23/2024    Reassessment (most recent)       Discharge Reassessment - 09/19/24 1531          Discharge Reassessment    Assessment Type Discharge Planning Reassessment     Did the patient's condition or plan change since previous assessment? No     Discharge Plan discussed with: Patient     Communicated PHOEBE with patient/caregiver Yes     Discharge Plan A Home with family     Discharge Plan B Home with family     Transition of Care Barriers None     Why the patient remains in the hospital Requires continued medical care        Post-Acute Status    Hospital Resources/Appts/Education Provided Provided patient/caregiver with written discharge plan information                   Possible d/c tomorrow.

## 2024-09-19 NOTE — SUBJECTIVE & OBJECTIVE
Past Medical History:   Diagnosis Date    Anemia 09/09/2024    Asthma     Cancer     Carotid occlusion, left     100% blockage    COPD (chronic obstructive pulmonary disease)     Current tobacco use 08/20/2018    Deep vein thrombosis     Disorder of kidney and ureter     DVT (deep venous thrombosis)     Elevated WBC count 09/10/2024    Gross hematuria 07/30/2024    Hx of hepatitis     Hyperlipidemia     Hypertension     on medication monitoring    Hypertensive heart and chronic kidney disease with heart failure and stage 1 through stage 4 chronic kidney disease, or unspecified chronic kidney disease 08/29/2024    Documented on 01/03/2023      Peripheral artery disease     Prostate cancer 2001    Dr. Bai        Past Surgical History:   Procedure Laterality Date    ADENOIDECTOMY      APPENDECTOMY      Arthroscopic left knee Left     BARBOTAGE OF BLADDER N/A 7/30/2024    Procedure: BARBOTAGE, BLADDER;  Surgeon: Fabian Bai MD;  Location: [facility];  Service: Urology;  Laterality: N/A;    BARBOTAGE OF URETER Bilateral 7/30/2024    Procedure: BARBOTAGE, URETER;  Surgeon: Fabian Bai MD;  Location: [facility];  Service: Urology;  Laterality: Bilateral;    CARDIAC CATHETERIZATION      no stents    COLONOSCOPY      CREATION, ILEAL CONDUIT N/A 9/16/2024    Procedure: CREATION, ILEAL CONDUIT;  Surgeon: Hari Salguero MD;  Location: [address];  Service: Urology;  Laterality: N/A;    CYSTOSCOPY W/ RETROGRADES Bilateral 7/30/2024    Procedure: CYSTOSCOPY, WITH RETROGRADE PYELOGRAM;  Surgeon: Fabian Bai MD;  Location: [facility] OR;  Service: Urology;  Laterality: Bilateral;    CYSTOSCOPY W/ URETERAL STENT PLACEMENT Left 7/30/2024    Procedure: CYSTOSCOPY, WITH URETERAL STENT INSERTION;  Surgeon: Fabian Bai MD;  Location: [facility] OR;  Service: Urology;  Laterality: Left;    DILATION OF URETER Left 7/30/2024    Procedure: DILATION, URETER;  Surgeon: Fabian Bai MD;  Location: [facility];  Service:  Urology;  Laterality: Left;    DILATION OF URETHRA N/A 7/30/2024    Procedure: DILATION, URETHRA;  Surgeon: Fabian Bai MD;  Location: [facility] OR;  Service: Urology;  Laterality: N/A;    INSERTION OF MULTIFOCAL INTRAOCULAR LENS Left 12/12/2018    Procedure: INSERTION, IOL, MULTIFOCAL;  Surgeon: Eleanor Seaman MD;  Location: [facility] OR;  Service: Ophthalmology;  Laterality: Left;    INSERTION OF MULTIFOCAL INTRAOCULAR LENS Right 4/3/2019    Procedure: INSERTION, IOL, MULTIFOCAL;  Surgeon: Eleanor Seaman MD;  Location: [facility] OR;  Service: Ophthalmology;  Laterality: Right;    NECK SURGERY  2002    PHACOEMULSIFICATION OF CATARACT Left 12/12/2018    Procedure: PHACOEMULSIFICATION, CATARACT;  Surgeon: Eleanor Seaman MD;  Location: [facility] OR;  Service: Ophthalmology;  Laterality: Left;    PHACOEMULSIFICATION OF CATARACT Right 4/3/2019    Procedure: PHACOEMULSIFICATION, CATARACT;  Surgeon: Eleanor Seaman MD;  Location: [facility] OR;  Service: Ophthalmology;  Laterality: Right;    PROSTATE SURGERY  2001    Radical prostectemy     RADICAL CYSTECTOMY N/A 9/16/2024    Procedure: CYSTECTOMY, RADICAL;  Surgeon: Hari Salguero MD;  Location: [address];  Service: Urology;  Laterality: N/A;    SPINE SURGERY  2002    Fusion of c4,c5,c6    TONSILLECTOMY      TURBT (TRANSURETHRAL RESECTION OF BLADDER TUMOR) N/A 7/30/2024    Procedure: TURBT (TRANSURETHRAL RESECTION OF BLADDER TUMOR);  Surgeon: Fabian Bai MD;  Location: [facility] OR;  Service: Urology;  Laterality: N/A;    URETEROSCOPY Bilateral 7/30/2024    Procedure: URETEROSCOPY;  Surgeon: Fabian Bai MD;  Location: [facility] OR;  Service: Urology;  Laterality: Bilateral;       Review of patient's allergies indicates:   Allergen Reactions    Pcn [penicillins] Anaphylaxis    Valium [diazepam] Anaphylaxis       No current facility-administered medications on file prior to encounter.     Current Outpatient Medications on File Prior to Encounter   Medication Sig    acetaminophen  (TYLENOL) 80 MG Chew Take by mouth.    aspirin (ECOTRIN) 81 MG EC tablet Take 81 mg by mouth once daily.    cyclobenzaprine (FLEXERIL) 10 MG tablet Take 10 mg by mouth 3 (three) times daily.    gabapentin (NEURONTIN) 300 MG capsule Take 300 mg by mouth 3 (three) times daily.    rosuvastatin (CRESTOR) 40 MG Tab Take 40 mg by mouth.    albuterol 90 mcg/actuation inhaler Inhale 2 puffs into the lungs every 6 (six) hours as needed for Wheezing. Rescue    ascorbic acid, vitamin C, (VITAMIN C) 100 MG tablet Take 100 mg by mouth.    cholecalciferol, vitamin D3, 10 mcg (400 unit) Cap capsule Take 1 tablet by mouth.    multivitamin (THERAGRAN) per tablet Take 1 tablet by mouth once daily.    umeclidinium (INCRUSE ELLIPTA) 62.5 mcg/actuation DsDv Inhale 1 Inhaler into the lungs once daily. Controller    zinc gluconate 50 mg tablet Take 50 mg by mouth. 2 tablets per day     Family History       Problem Relation (Age of Onset)    Alzheimer's disease Mother    COPD Brother    Cancer Sister    Cervical cancer Sister    Diabetes Father, Sister, Brother    Heart disease Brother    Hypertension Brother    No Known Problems Son, Daughter    Obesity Brother    Scoliosis Daughter          Tobacco Use    Smoking status: Former     Current packs/day: 1.00     Average packs/day: 1 pack/day for 51.0 years (51.0 ttl pk-yrs)     Types: Cigarettes    Smokeless tobacco: Never    Tobacco comments:     Smoked about 50-55 years about 1 pack per day.  Quit October 20, 2020.     Patient aware of smoking cessation program and aware of health risk due to smoking.    Substance and Sexual Activity    Alcohol use: Not Currently    Drug use: Never    Sexual activity: Not Currently     Partners: Female     Review of Systems   Constitutional:  Positive for activity change. Negative for chills, fatigue and fever.   HENT:  Negative for rhinorrhea and sore throat.    Respiratory:  Negative for cough, shortness of breath and wheezing.    Cardiovascular:   Negative for chest pain, palpitations and leg swelling.   Gastrointestinal:  Positive for abdominal pain. Negative for diarrhea and nausea.   Genitourinary:  Negative for dysuria and hematuria.   Musculoskeletal:  Negative for back pain.   Skin:  Negative for rash and wound.   Neurological:  Negative for tremors and headaches.   Psychiatric/Behavioral:  Negative for agitation and confusion.      Objective:     Vital Signs (Most Recent):  Temp: 98.5 °F (36.9 °C) (09/19/24 1123)  Pulse: 96 (09/19/24 1123)  Resp: 18 (09/19/24 1123)  BP: (!) 160/90 (09/19/24 1130)  SpO2: 97 % (09/19/24 1123) Vital Signs (24h Range):  Temp:  [96.9 °F (36.1 °C)-98.9 °F (37.2 °C)] 98.5 °F (36.9 °C)  Pulse:  [] 96  Resp:  [16-18] 18  SpO2:  [92 %-100 %] 97 %  BP: (160-203)/(72-90) 160/90     Weight: 77.1 kg (170 lb)  Body mass index is 24.39 kg/m².     Physical Exam  Constitutional:       General: He is not in acute distress.     Appearance: Normal appearance.   HENT:      Head: Normocephalic and atraumatic.      Right Ear: External ear normal.      Left Ear: External ear normal.      Nose: No congestion or rhinorrhea.      Mouth/Throat:      Mouth: Mucous membranes are moist.      Pharynx: Oropharynx is clear.   Eyes:      Extraocular Movements: Extraocular movements intact.      Conjunctiva/sclera: Conjunctivae normal.   Cardiovascular:      Rate and Rhythm: Normal rate and regular rhythm.      Pulses: Normal pulses.      Heart sounds: Normal heart sounds. No murmur heard.  Pulmonary:      Effort: Pulmonary effort is normal.      Breath sounds: Normal breath sounds. No wheezing or rales.   Abdominal:      General: Abdomen is flat. There is distension.      Palpations: Abdomen is soft.      Tenderness: There is abdominal tenderness (Mildly tender over incision site). There is no guarding.   Musculoskeletal:         General: No swelling.      Right lower leg: No edema.      Left lower leg: No edema.   Skin:     General: Skin is warm  and dry.      Findings: No rash.   Neurological:      Mental Status: He is alert and oriented to person, place, and time. Mental status is at baseline.      Motor: No weakness.   Psychiatric:         Mood and Affect: Mood normal.         Behavior: Behavior normal.       Significant Labs: All pertinent labs within the past 24 hours have been reviewed.  CBC:   Recent Labs   Lab 09/18/24  0209 09/18/24  1810 09/19/24  0211   WBC 10.02 16.38* 12.05   HGB 6.9* 10.9* 9.5*   HCT 22.1* 33.9* 29.3*    184 195     CMP:   Recent Labs   Lab 09/19/24  0211 09/19/24  0839 09/19/24  1119   *  135* 135* 135*   K 4.0  4.0 4.2 4.3     105 104 103   CO2 22*  22* 24 23   *  113* 108 124*   BUN 17  17 18 19   CREATININE 1.2  1.2 1.3 1.2   CALCIUM 8.5*  8.5* 8.7 9.0   ANIONGAP 8  8 7* 9       Significant Imaging: I have reviewed all pertinent imaging results/findings within the past 24 hours.

## 2024-09-19 NOTE — SUBJECTIVE & OBJECTIVE
Interval History: No acute events overnight. He became mildly hypertensive. The patient reports having a BM overnight along with copious flatus. After he states his abdomen feels significantly better. He was able to get out of bed to chair twice however did not walk in the hallway.     Review of Systems  Objective:     Temp:  [96.9 °F (36.1 °C)-99.2 °F (37.3 °C)] 98.4 °F (36.9 °C)  Pulse:  [] 89  Resp:  [16-18] 18  SpO2:  [92 %-100 %] 96 %  BP: (147-203)/(61-88) 162/72     Body mass index is 24.39 kg/m².           Drains       Drain  Duration                  Closed/Suction Drain 09/16/24 1751 Left Abdomen Bulb 19 Fr. <1 day         Ureteral Drain/Stent 09/16/24 1652 Left ureter 7.2 Fr. <1 day         Ureteral Drain/Stent 09/16/24 1652 Right ureter 7.2 Fr. <1 day         Urostomy 09/16/24 1751 ileal conduit RUQ <1 day                     Physical Exam  Constitutional:       Appearance: Normal appearance.   HENT:      Head: Normocephalic and atraumatic.   Abdominal:      General: Abdomen is flat. There is distension.      Palpations: Abdomen is soft.      Tenderness: There is no guarding or rebound.      Comments: Ostomy is healthy appearing covered in appliance.   Stents in place.  LLQ IVANIA draining SS output.   Incisions are clean dry and intact.   Skin:     General: Skin is warm.   Neurological:      General: No focal deficit present.      Mental Status: He is alert and oriented to person, place, and time.   Psychiatric:         Mood and Affect: Mood normal.           Significant Labs:    BMP:  Recent Labs   Lab 09/18/24 2018 09/18/24 2352 09/19/24  0211   * 135* 135*  135*   K 4.2 4.8 4.0  4.0    105 105  105   CO2 23 22* 22*  22*   BUN 16 18 17  17   CREATININE 1.3 1.3 1.2  1.2   CALCIUM 8.8 9.0 8.5*  8.5*       CBC:   Recent Labs   Lab 09/18/24  0209 09/18/24  1810 09/19/24  0211   WBC 10.02 16.38* 12.05   HGB 6.9* 10.9* 9.5*   HCT 22.1* 33.9* 29.3*    184 195       Blood  "Culture: No results for input(s): "LABBLOO" in the last 168 hours.  Urine Culture: No results for input(s): "LABURIN" in the last 168 hours.  Urine Studies: No results for input(s): "COLORU", "APPEARANCEUA", "PHUR", "SPECGRAV", "PROTEINUA", "GLUCUA", "KETONESU", "BILIRUBINUA", "OCCULTUA", "NITRITE", "UROBILINOGEN", "LEUKOCYTESUR", "RBCUA", "WBCUA", "BACTERIA", "SQUAMEPITHEL", "HYALINECASTS" in the last 168 hours.    Invalid input(s): "WRIGHTSUR"    Significant Imaging:  All pertinent imaging results/findings from the past 24 hours have been reviewed.                "

## 2024-09-19 NOTE — HPI
The patient is a 73 year old male with a history of HTN, COPD, HLD, prostate Ca, CVA, GERD, CAD, CKD III, neuropathy who is post-op day 2 following radical prostatectomy with bilateral pelvic node dissection and ileal conduit. Medicine consulted for blood pressure management recommendations.     The patient's home regimen includes coreg 12.5 mg BID (started due to history of PVC's noted on cardiac monitor). After addition of the beta-blocker his home BP was noted to be low on both the coreg and Nifedipine 60 mg daily that he had previously been on. The Nifedipine was stopped and he was continued on Coreg. The patient's wife provides the added information that at home his typical blood pressure is about 130-160 systolic and 70-80 diastolic.     At the time of evaluation the patient's BP is 203/88 and 190/76 on recheck. He reports abdominal pain from the surgery and received PRN oxycodone 5 mg immediately after BP check. Analgesic options are limited due to concern of developing ileus on abdominal X-ray.

## 2024-09-19 NOTE — PROGRESS NOTES
[facility]  Wound Care    Patient Name:  Rhett Johnson   MRN:  [id number]  Date: 9/19/2024  Diagnosis: <principal problem not specified>    History:     Past Medical History:   Diagnosis Date    Anemia 09/09/2024    Asthma     Cancer     Carotid occlusion, left     100% blockage    COPD (chronic obstructive pulmonary disease)     Current tobacco use 08/20/2018    Deep vein thrombosis     Disorder of kidney and ureter     DVT (deep venous thrombosis)     Elevated WBC count 09/10/2024    Gross hematuria 07/30/2024    Hx of hepatitis     Hyperlipidemia     Hypertension     on medication monitoring    Hypertensive heart and chronic kidney disease with heart failure and stage 1 through stage 4 chronic kidney disease, or unspecified chronic kidney disease 08/29/2024    Documented on 01/03/2023      Peripheral artery disease     Prostate cancer 2001    Dr. Bai        Social History     Socioeconomic History    Marital status:      Spouse name: Tammie   Tobacco Use    Smoking status: Former     Current packs/day: 1.00     Average packs/day: 1 pack/day for 51.0 years (51.0 ttl pk-yrs)     Types: Cigarettes    Smokeless tobacco: Never    Tobacco comments:     Smoked about 50-55 years about 1 pack per day.  Quit October 20, 2020.     Patient aware of smoking cessation program and aware of health risk due to smoking.    Substance and Sexual Activity    Alcohol use: Not Currently    Drug use: Never    Sexual activity: Not Currently     Partners: Female   Social History Narrative    Lives in [place] with his wife. He is retired from [employer]. 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      Social Determinants of Health     Financial Resource Strain: Low Risk  (9/17/2024)    Overall Financial Resource Strain (CARDIA)     Difficulty of Paying Living Expenses: Not hard at all   Food Insecurity: No Food Insecurity (9/17/2024)    Hunger Vital Sign      Worried About Running Out of Food in the Last Year: Never true     Ran Out of Food in the Last Year: Never true   Transportation Needs: No Transportation Needs (9/17/2024)    TRANSPORTATION NEEDS     Transportation : No   Physical Activity: Inactive (9/17/2024)    Exercise Vital Sign     Days of Exercise per Week: 0 days     Minutes of Exercise per Session: 0 min   Stress: No Stress Concern Present (9/17/2024)    [employer] - Occupational Stress Questionnaire     Feeling of Stress : Not at all   Housing Stability: Low Risk  (9/17/2024)    Housing Stability Vital Sign     Unable to Pay for Housing in the Last Year: No     Homeless in the Last Year: No       Precautions:     Allergies as of 08/22/2024 - Reviewed 08/22/2024   Allergen Reaction Noted    Pcn [penicillins] Anaphylaxis 03/07/2016    Valium [diazepam] Anaphylaxis 01/11/2024       WO Assessment Details/Treatment   Met with patient for ostomy training lesson #2.  Family at the bedside for lesson.  Removed patient's ostomy pouch. Discussed and performed stoma and nguyễn-stomal care. The peristomal skin is blistered, pink and moist at the perimeter of where the wafer lays. Demonstrated cutting pouch to fit stoma allowing, 1/8 inch clearance.  Pt's family returned demonstration and applied hard convex coloplast pouch to stoma site without difficulty. Reviewed the reading materials and answered all questions.  Pt and pt's family verbalized understanding of when to alert MD of issues with stoma.  Discussed follow up plan of care and provided patient with contact name and numbers.  Will follow up with patient tomorrow. Supplies at the bedside.      09/19/24 0955        Urostomy 09/16/24 1751 ileal conduit RUQ   Date of First Assessment/Time of First Assessment: 09/16/24 1751   Present Prior to Hospital Arrival?: No  Inserted by: MD  Urostomy Type: ileal conduit  Location: RUQ   Wound Image    Stoma Appearance round;red;moist;protruding  above skin level;stents   Stomal Appliance 1 piece;No Leakage;Changed;To drainage bag to dependent drainage   Accessories/Skin Care barrier substance over peristomal skin;cleansed w/ water   Stoma Function yellow urine   Peristomal Skin other (see comments)  (blistered)   Tolerance no signs/symptoms of discomfort   Treatment Bag change         09/19/2024

## 2024-09-19 NOTE — CONSULTS
[facility]  Consult Note    Patient Name: hRett Johnson  MRN: [id number]  Admission Date: 9/16/2024  Hospital Length of Stay: 3 days  Attending Physician: Hari Salguero MD   Primary Care Provider: Sue Campos MD       Patient information was obtained from patient and past medical records.     Subjective:     Principal Problem: Hypertension     HPI: The patient is a 73 year old male with a history of HTN, COPD, HLD, prostate Ca, CVA, GERD, CAD, CKD III, neuropathy who is post-op day 2 following radical prostatectomy with bilateral pelvic node dissection and ileal conduit. Medicine consulted for blood pressure management recommendations.     The patient's home regimen includes coreg 12.5 mg BID (started due to history of PVC's noted on cardiac monitor). After addition of the beta-blocker his home BP was noted to be low on both the coreg and Nifedipine 60 mg daily that he had previously been on. The Nifedipine was stopped and he was continued on Coreg. The patient's wife provides the added information that at home his typical blood pressure is about 130-160 systolic and 70-80 diastolic.     At the time of evaluation the patient's BP is 203/88 and 190/76 on recheck. He reports abdominal pain from the surgery and received PRN oxycodone 5 mg immediately after BP check. Analgesic options are limited due to concern of developing ileus on abdominal X-ray.    Past Medical History:   Diagnosis Date    Anemia 09/09/2024    Asthma     Cancer     Carotid occlusion, left     100% blockage    COPD (chronic obstructive pulmonary disease)     Current tobacco use 08/20/2018    Deep vein thrombosis     Disorder of kidney and ureter     DVT (deep venous thrombosis)     Elevated WBC count 09/10/2024    Gross hematuria 07/30/2024    Hx of hepatitis     Hyperlipidemia     Hypertension     on medication monitoring    Hypertensive heart and chronic kidney disease with heart failure and stage 1  through stage 4 chronic kidney disease, or unspecified chronic kidney disease 08/29/2024    Documented on 01/03/2023      Peripheral artery disease     Prostate cancer 2001    Dr. Bai        Past Surgical History:   Procedure Laterality Date    ADENOIDECTOMY      APPENDECTOMY      Arthroscopic left knee Left     BARBOTAGE OF BLADDER N/A 7/30/2024    Procedure: BARBOTAGE, BLADDER;  Surgeon: Fabian Bai MD;  Location: [facility];  Service: Urology;  Laterality: N/A;    BARBOTAGE OF URETER Bilateral 7/30/2024    Procedure: BARBOTAGE, URETER;  Surgeon: Fabian Bai MD;  Location: [facility] OR;  Service: Urology;  Laterality: Bilateral;    CARDIAC CATHETERIZATION      no stents    COLONOSCOPY      CREATION, ILEAL CONDUIT N/A 9/16/2024    Procedure: CREATION, ILEAL CONDUIT;  Surgeon: Hari Salguero MD;  Location: [address];  Service: Urology;  Laterality: N/A;    CYSTOSCOPY W/ RETROGRADES Bilateral 7/30/2024    Procedure: CYSTOSCOPY, WITH RETROGRADE PYELOGRAM;  Surgeon: Fabian Bai MD;  Location: [facility] OR;  Service: Urology;  Laterality: Bilateral;    CYSTOSCOPY W/ URETERAL STENT PLACEMENT Left 7/30/2024    Procedure: CYSTOSCOPY, WITH URETERAL STENT INSERTION;  Surgeon: Fabian Bai MD;  Location: [facility] OR;  Service: Urology;  Laterality: Left;    DILATION OF URETER Left 7/30/2024    Procedure: DILATION, URETER;  Surgeon: Fabian Bai MD;  Location: [facility] OR;  Service: Urology;  Laterality: Left;    DILATION OF URETHRA N/A 7/30/2024    Procedure: DILATION, URETHRA;  Surgeon: Fabian Bai MD;  Location: [facility] OR;  Service: Urology;  Laterality: N/A;    INSERTION OF MULTIFOCAL INTRAOCULAR LENS Left 12/12/2018    Procedure: INSERTION, IOL, MULTIFOCAL;  Surgeon: Eleanor Seaman MD;  Location: [facility] OR;  Service: Ophthalmology;  Laterality: Left;    INSERTION OF MULTIFOCAL INTRAOCULAR LENS Right 4/3/2019    Procedure: INSERTION, IOL, MULTIFOCAL;  Surgeon: Eleanor Seaman MD;  Location: [facility];   Service: Ophthalmology;  Laterality: Right;    NECK SURGERY  2002    PHACOEMULSIFICATION OF CATARACT Left 12/12/2018    Procedure: PHACOEMULSIFICATION, CATARACT;  Surgeon: Eleanor Seaman MD;  Location: [facility] OR;  Service: Ophthalmology;  Laterality: Left;    PHACOEMULSIFICATION OF CATARACT Right 4/3/2019    Procedure: PHACOEMULSIFICATION, CATARACT;  Surgeon: Eleanor Seaman MD;  Location: [facility] OR;  Service: Ophthalmology;  Laterality: Right;    PROSTATE SURGERY  2001    Radical prostectemy     RADICAL CYSTECTOMY N/A 9/16/2024    Procedure: CYSTECTOMY, RADICAL;  Surgeon: Hari Salguero MD;  Location: [address];  Service: Urology;  Laterality: N/A;    SPINE SURGERY  2002    Fusion of c4,c5,c6    TONSILLECTOMY      TURBT (TRANSURETHRAL RESECTION OF BLADDER TUMOR) N/A 7/30/2024    Procedure: TURBT (TRANSURETHRAL RESECTION OF BLADDER TUMOR);  Surgeon: Fabian Bai MD;  Location: [facility] OR;  Service: Urology;  Laterality: N/A;    URETEROSCOPY Bilateral 7/30/2024    Procedure: URETEROSCOPY;  Surgeon: Fabian Bai MD;  Location: [facility] OR;  Service: Urology;  Laterality: Bilateral;       Review of patient's allergies indicates:   Allergen Reactions    Pcn [penicillins] Anaphylaxis    Valium [diazepam] Anaphylaxis       No current facility-administered medications on file prior to encounter.     Current Outpatient Medications on File Prior to Encounter   Medication Sig    acetaminophen (TYLENOL) 80 MG Chew Take by mouth.    aspirin (ECOTRIN) 81 MG EC tablet Take 81 mg by mouth once daily.    cyclobenzaprine (FLEXERIL) 10 MG tablet Take 10 mg by mouth 3 (three) times daily.    gabapentin (NEURONTIN) 300 MG capsule Take 300 mg by mouth 3 (three) times daily.    rosuvastatin (CRESTOR) 40 MG Tab Take 40 mg by mouth.    albuterol 90 mcg/actuation inhaler Inhale 2 puffs into the lungs every 6 (six) hours as needed for Wheezing. Rescue    ascorbic acid, vitamin C, (VITAMIN C) 100 MG tablet Take 100 mg by mouth.     cholecalciferol, vitamin D3, 10 mcg (400 unit) Cap capsule Take 1 tablet by mouth.    multivitamin (THERAGRAN) per tablet Take 1 tablet by mouth once daily.    umeclidinium (INCRUSE ELLIPTA) 62.5 mcg/actuation DsDv Inhale 1 Inhaler into the lungs once daily. Controller    zinc gluconate 50 mg tablet Take 50 mg by mouth. 2 tablets per day     Family History       Problem Relation (Age of Onset)    Alzheimer's disease Mother    COPD Brother    Cancer Sister    Cervical cancer Sister    Diabetes Father, Sister, Brother    Heart disease Brother    Hypertension Brother    No Known Problems Son, Daughter    Obesity Brother    Scoliosis Daughter          Tobacco Use    Smoking status: Former     Current packs/day: 1.00     Average packs/day: 1 pack/day for 51.0 years (51.0 ttl pk-yrs)     Types: Cigarettes    Smokeless tobacco: Never    Tobacco comments:     Smoked about 50-55 years about 1 pack per day.  Quit October 20, 2020.     Patient aware of smoking cessation program and aware of health risk due to smoking.    Substance and Sexual Activity    Alcohol use: Not Currently    Drug use: Never    Sexual activity: Not Currently     Partners: Female     Review of Systems   Constitutional:  Positive for activity change. Negative for chills, fatigue and fever.   HENT:  Negative for rhinorrhea and sore throat.    Respiratory:  Negative for cough, shortness of breath and wheezing.    Cardiovascular:  Negative for chest pain, palpitations and leg swelling.   Gastrointestinal:  Positive for abdominal pain. Negative for diarrhea and nausea.   Genitourinary:  Negative for dysuria and hematuria.   Musculoskeletal:  Negative for back pain.   Skin:  Negative for rash and wound.   Neurological:  Negative for tremors and headaches.   Psychiatric/Behavioral:  Negative for agitation and confusion.      Objective:     Vital Signs (Most Recent):  Temp: 98.5 °F (36.9 °C) (09/19/24 1123)  Pulse: 96 (09/19/24 1123)  Resp: 18 (09/19/24  1123)  BP: (!) 160/90 (09/19/24 1130)  SpO2: 97 % (09/19/24 1123) Vital Signs (24h Range):  Temp:  [96.9 °F (36.1 °C)-98.9 °F (37.2 °C)] 98.5 °F (36.9 °C)  Pulse:  [] 96  Resp:  [16-18] 18  SpO2:  [92 %-100 %] 97 %  BP: (160-203)/(72-90) 160/90     Weight: 77.1 kg (170 lb)  Body mass index is 24.39 kg/m².     Physical Exam  Constitutional:       General: He is not in acute distress.     Appearance: Normal appearance.   HENT:      Head: Normocephalic and atraumatic.      Right Ear: External ear normal.      Left Ear: External ear normal.      Nose: No congestion or rhinorrhea.      Mouth/Throat:      Mouth: Mucous membranes are moist.      Pharynx: Oropharynx is clear.   Eyes:      Extraocular Movements: Extraocular movements intact.      Conjunctiva/sclera: Conjunctivae normal.   Cardiovascular:      Rate and Rhythm: Normal rate and regular rhythm.      Pulses: Normal pulses.      Heart sounds: Normal heart sounds. No murmur heard.  Pulmonary:      Effort: Pulmonary effort is normal.      Breath sounds: Normal breath sounds. No wheezing or rales.   Abdominal:      General: Abdomen is flat. There is distension.      Palpations: Abdomen is soft.      Tenderness: There is abdominal tenderness (Mildly tender over incision site). There is no guarding.   Musculoskeletal:         General: No swelling.      Right lower leg: No edema.      Left lower leg: No edema.   Skin:     General: Skin is warm and dry.      Findings: No rash.   Neurological:      Mental Status: He is alert and oriented to person, place, and time. Mental status is at baseline.      Motor: No weakness.   Psychiatric:         Mood and Affect: Mood normal.         Behavior: Behavior normal.       Significant Labs: All pertinent labs within the past 24 hours have been reviewed.  CBC:   Recent Labs   Lab 09/18/24  0209 09/18/24  1810 09/19/24  0211   WBC 10.02 16.38* 12.05   HGB 6.9* 10.9* 9.5*   HCT 22.1* 33.9* 29.3*    184 195     CMP:   Recent  Labs   Lab 09/19/24  0211 09/19/24  0839 09/19/24  1119   *  135* 135* 135*   K 4.0  4.0 4.2 4.3     105 104 103   CO2 22*  22* 24 23   *  113* 108 124*   BUN 17  17 18 19   CREATININE 1.2  1.2 1.3 1.2   CALCIUM 8.5*  8.5* 8.7 9.0   ANIONGAP 8  8 7* 9       Significant Imaging: I have reviewed all pertinent imaging results/findings within the past 24 hours.  Assessment/Plan:     White coat syndrome with diagnosis of hypertension  Patient and family note a history of white-coat hypertension. Suspect he will likely be able to return to his previous home antihypertensive regimen on discharge. Encouraged patient to continue home BP monitoring after discharge.    Essential hypertension  Chronic, controlled. Latest blood pressure and vitals reviewed-     Temp:  [96.9 °F (36.1 °C)-98.9 °F (37.2 °C)]   Pulse:  []   Resp:  [16-18]   BP: (160-203)/(72-90)   SpO2:  [92 %-100 %] .     Home regimen is currently just coreg 12.5 mg BID. He had stopped home nifedipine 60 mg daily due to hypotension after initiation of the beta-blocker.    Suspect that once pain is improved that he will not need long-term escalated doses of his antihypertensive regimen. Will plan for short-acting PO hydralazine in the interim with small increases in home regimen if needed for additional BP control.    9/19: Patient's BP measured in the AM found to be low 200's systolic despite the patient reporting minimal pain. Additionally, he had received his AM dose of coreg 12.5 mg as well as 50 mg PO hydralazine three hours prior. Will slightly increase coreg dose.    -- Continue coreg 25 mg BID while inpatient (suspect he will be able to revert back to 12.5 mg BID on discharge)  -- Continue PRN hydralazine 50 mg TID for SBP > 180  -- Ongoing aggressive pain control   -- Tylenol changed from 650 mg QID to 1,000 mg TID      VTE Risk Mitigation (From admission, onward)           Ordered     heparin (porcine) injection 5,000  Units  Every 8 hours         09/16/24 1818     IP VTE HIGH RISK PATIENT  Once         09/16/24 1818     Place sequential compression device  Until discontinued         09/16/24 1818                    Thank you for your consult. I will follow-up with patient. Please contact us if you have any additional questions.    Jeison Dorman MD  Department of Hospital Medicine   [facility]

## 2024-09-19 NOTE — ADDENDUM NOTE
Addendum  created 09/19/24 1317 by Josselyn Pugh MD    Charge Capture section accepted, Cosign clinical note with attestation

## 2024-09-19 NOTE — PT/OT/SLP PROGRESS
Physical Therapy Treatment    Patient Name:  Rhett Johnson   MRN:  [id number]    Recommendations:     Discharge Recommendations: Low Intensity Therapy  Discharge Equipment Recommendations: none  Barriers to discharge: None    Assessment:     Rhett Johnson is a 73 y.o. male admitted with a medical diagnosis of <principal problem not specified>.  He presents with the following impairments/functional limitations: weakness, impaired endurance, impaired self care skills, impaired functional mobility, gait instability, impaired balance, pain .  Progressing with PT Intervention. Pt Progressing with improving gait distance. Pt would continue to benefit from skilled PT to address overall function al mobility, goals , and to return to functional baseline.  Goals remain appropriate.   Rehab Prognosis: Good; patient would benefit from acute skilled PT services to address these deficits and reach maximum level of function.    Recent Surgery: Procedure(s) (LRB):  CREATION, ILEAL CONDUIT (N/A)  CYSTECTOMY, RADICAL (N/A) 3 Days Post-Op    Plan:     During this hospitalization, patient to be seen 4 x/week to address the identified rehab impairments via gait training, therapeutic activities, therapeutic exercises and progress toward the following goals:    Plan of Care Expires:  10/17/24    Subjective     Chief Complaint: pain with mobility   Patient/Family Comments/goals: they told me I had to walk 5 hallways today  Pain/Comfort:  Pain Rating 1: 0/10  Location - Orientation 1: generalized  Location 1: abdomen  Pain Addressed 1: Pre-medicate for activity, Reposition, Distraction, Cessation of Activity  Pain Rating Post-Intervention 1: 3/10      Objective:     Communicated with RN prior to session.  Patient found HOB elevated with  (perineural catheter; peripheral IV; IVANIA drain; telemetry; SCD) upon PT entry to room.     General Precautions: Standard, fall  Orthopedic Precautions: N/A  Braces: N/A  Respiratory Status: Room air      Functional Mobility:  Bed Mobility:     Rolling Right: minimum assistance  Scooting: minimum assistance  Supine to Sit: minimum assistance  Transfers:     Sit to Stand:  minimum assistance with rolling walker  Gait: 400 with RW and CGA for balance/safety. Pt. amb. with decreased step length/sandee and slightly unsteady gait. Vcs for upright posture and placement of AD      AM-PAC 6 CLICK MOBILITY  Turning over in bed (including adjusting bedclothes, sheets and blankets)?: 3  Sitting down on and standing up from a chair with arms (e.g., wheelchair, bedside commode, etc.): 3  Moving from lying on back to sitting on the side of the bed?: 3  Moving to and from a bed to a chair (including a wheelchair)?: 3  Need to walk in hospital room?: 3  Climbing 3-5 steps with a railing?: 3  Basic Mobility Total Score: 18       Treatment & Education:  Therapist provided instruction and educated of patient on progress, safety, d/c, PT POC,   proper body mechanics, energy conservation, and fall prevention strategies during tasks listed above, on the effects of prolonged immobility and the importance of performing OOB activity and exercises to promote healing and reduce recovery time     Updated white board with appropriate PT mobility information for medical team notification   Donned an extra gown   Pt encouraged to ambulate in hallways 3x/day with nursing or family assistance to improve endurance.   Pt safe to ambulate in hallway with RN or PCT assistance  Call nursing/pct to transfer to chair/use bathroom. Pt stated understanding  Bedside table in front of patient and area set up for function, convenience, and safety. RN aware of patient's mobility needs and status. Questions/concerns addressed within PTA scope of practice, patient with no further questions. Time was provided for active listening, discussion of health disposition, and discussion of safe discharge. Pt?verbalized?agreement .    Patient left up in chair with all  lines intact, call button in reach, and nsg notified..    GOALS:   Multidisciplinary Problems       Physical Therapy Goals          Problem: Physical Therapy    Goal Priority Disciplines Outcome Goal Variances Interventions   Physical Therapy Goal     PT, PT/OT Progressing     Description: Goals to be met by: 10/1/2024     Patient will increase functional independence with mobility by performin. Supine to sit with Set-up [place]  2. Sit to supine with Set-up [place]  3. Sit to stand transfer with Supervision  4. Bed to chair transfer with Supervision using LRAD  5. Gait  x 200 feet with Supervision using LRAD.   6. Lower extremity exercise program x15 reps per handout, with supervision                         Time Tracking:     PT Received On: 24  PT Start Time: 1125     PT Stop Time: 1154  PT Total Time (min): 29 min     Billable Minutes: Gait Training 15 and Therapeutic Activity 9    Treatment Type: Treatment  PT/PTA: PTA     Number of PTA visits since last PT visit: 2024

## 2024-09-19 NOTE — PLAN OF CARE
Acute Pain Service Plan of Care     Paused PNC this morning, patient endorsed adequate pain control on interview. Will discontinue pending afternoon evaluation of pain.     Cathy Rome MD PGY3  Acute Pain Service  [facility]

## 2024-09-19 NOTE — PLAN OF CARE
Acute Pain Service Plan of Care     Removed PNC this afternoon. Mr. Johnson is doing well, tolerated removal. Removed with blue tip intact.     Cathy Rome MD PGY3  [facility]

## 2024-09-19 NOTE — ANESTHESIA POST-OP PAIN MANAGEMENT
Acute Pain Service Progress Note    Rhett Johnson is a 73 y.o., male, [id number].    Surgery:  Radical cystectomy with bilateral pelvic node dissection and ileal conduit.     Post Op Day #: 3    Catheter type: perineural  Bilateral CARLO catheters    Infusion type: Ropivacaine 0.2%  15cc/3hr basal    Problem List:    Active Hospital Problems    Diagnosis  POA    Malignant neoplasm of overlapping sites of bladder [C67.8]  Yes      Resolved Hospital Problems   No resolved problems to display.       Subjective:     General appearance of alert, oriented, no complaints   Pain with rest: 1    Numbers   Pain with movement: 8    Numbers   Side Effects    1. Pruritis No    2. Nausea No  3. Motor Blockade No, 0=Ability to raise lower extremities off bed    4. Sedation No, 1=awake and alert    Objective:     Catheter sites clean, dry, intact         Vitals   Vitals:    09/19/24 0748   BP: (!) 193/80   Pulse: 97   Resp:    Temp:         Labs    No results displayed because visit has over 200 results.           Meds   Current Facility-Administered Medications   Medication Dose Route Frequency Provider Last Rate Last Admin    0.9%  NaCl infusion (for blood administration)   Intravenous Q24H PRN Rashmi Gomez MD        acetaminophen tablet 1,000 mg  1,000 mg Oral Q8H Jeison Dorman MD        atorvastatin tablet 80 mg  80 mg Oral Daily Elida Rojo MD   80 mg at 09/19/24 0748    bisacodyL suppository 10 mg  10 mg Rectal BID Rashmi Gomez MD        carvediloL tablet 12.5 mg  12.5 mg Oral Daily Elida Rojo MD   12.5 mg at 09/19/24 0748    carvediloL tablet 25 mg  25 mg Oral BID Jeison Dorman MD        cyclobenzaprine tablet 10 mg  10 mg Oral Q8H Nael Roach MD   10 mg at 09/19/24 0609    dextrose 10% bolus 125 mL 125 mL  12.5 g Intravenous PRN Lianne Talley MD        dextrose 10% bolus 250 mL 250 mL  25 g Intravenous PRN Lianne Talley MD        diphenhydrAMINE injection 12.5 mg  12.5  mg Intravenous Q6H PRN Elida Rojo MD        famotidine (PF) injection 20 mg  20 mg Intravenous Daily Hari Salguero MD   20 mg at 09/19/24 0749    gabapentin capsule 300 mg  300 mg Oral Q8H Rodger Giraldo DO   300 mg at 09/19/24 0608    heparin (porcine) injection 5,000 Units  5,000 Units Subcutaneous Q8H Elida Rojo MD   5,000 Units at 09/19/24 0609    hydrALAZINE tablet 50 mg  50 mg Oral Q8H PRN Jeison Dorman MD   50 mg at 09/19/24 0746    lactated ringers infusion   Intravenous Continuous Rashmi Gomez MD 75 mL/hr at 09/19/24 0747 Rate Change at 09/19/24 0747    ondansetron injection 4 mg  4 mg Intravenous Q8H PRN Elida Rojo MD   4 mg at 09/18/24 0942    oxyCODONE immediate release tablet 5 mg  5 mg Oral Q4H PRN Elida Rojo MD   5 mg at 09/18/24 1604    polyethylene glycol packet 17 g  17 g Oral Daily Elida Rojo MD   17 g at 09/18/24 0917    prochlorperazine injection Soln 5 mg  5 mg Intravenous Q6H PRN Elida Rojo MD        senna-docusate 8.6-50 mg per tablet 1 tablet  1 tablet Oral Daily PRN Cathy Rome MD        sodium chloride 0.9% flush 10 mL  10 mL Intravenous Q6H Hari Salguero MD   10 mL at 09/19/24 0600    And    sodium chloride 0.9% flush 10 mL  10 mL Intravenous PRN Hari Salguero MD        sodium phosphate 20.01 mmol in D5W 250 mL IVPB  20.01 mmol Intravenous Once Rashmi Gomez MD 62.5 mL/hr at 09/19/24 0916 20.01 mmol at 09/19/24 0916        Anticoagulant dose Heparin 5,000 units every 8 hours.    Assessment:  Rhett Johnson is a 73 year old male post-op day 3 s/p cystectomy with bilateral CARLO catheters. Pain control is adequate. Patient given prn oxycodone 5 mg 2x yesterday. Patient says he has minimal pain when sitting still, but that his pain severely increases with movement. Overall, pain seems to be improving and appropriately controlled.     Plan:    Pain adequately controlled on post-op day 3. PNC infusion  paused with plan to discontinue PNC today. Will continue multimodal pain regimen and adjust as needed.     1.) Discontinue Bilateral CARLO PNC infusions of Ropivacaine 0.2% at 15cc/3hr.  - Paused infusion this morning, will reassess this afternoon and remove catheter if pain control remains adequate     2.) Continue Multimodal Pain Regimen   - Tylenol 1000 mg q8h    - Gabapentin 300 mg q8h    - Flexeril 10 mg q8h     - Oxycodone 5 mg q4h PRN for moderate pain and severe pain (with Zofran to prevent Nausea/Vomiting)    3.) Abdominal Binder to add abdominal support and reduce his pain with movement      4.) Bowel Regimen  - Miralax daily and PRN senna in setting of opioids to avoid constipation     Case discussed with staff, Dr. Pugh; final recommendations per attestation above.     Thank you for your consult and allowing us to participate in the care of this patient. We will sign off. Please call the Acute Pain Service at [phone] or Anesthesia at [phone] if you have any questions or concerns.     Nael Roach MD (PGY1)  Anesthesiology   [facility]

## 2024-09-19 NOTE — PLAN OF CARE
Problem: Adult Inpatient Plan of Care  Goal: Plan of Care Review  Outcome: Progressing  Goal: Patient-Specific Goal (Individualized)  Outcome: Progressing  Goal: Absence of Hospital-Acquired Illness or Injury  Outcome: Progressing  Goal: Optimal Comfort and Wellbeing  Outcome: Progressing  Goal: Readiness for Transition of Care  Outcome: Progressing     Problem: Infection  Goal: Absence of Infection Signs and Symptoms  Outcome: Progressing     Problem: Wound  Goal: Optimal Coping  Outcome: Progressing  Goal: Optimal Functional Ability  Outcome: Progressing  Goal: Absence of Infection Signs and Symptoms  Outcome: Progressing  Goal: Improved Oral Intake  Outcome: Progressing  Goal: Optimal Pain Control and Function  Outcome: Progressing  Goal: Skin Health and Integrity  Outcome: Progressing  Goal: Optimal Wound Healing  Outcome: Progressing     Problem: Fall Injury Risk  Goal: Absence of Fall and Fall-Related Injury  Outcome: Progressing     Problem: Skin Injury Risk Increased  Goal: Skin Health and Integrity  Outcome: Progressing   PNC paused then pulled several hours after pain management confirmed. Pt ambulating.

## 2024-09-19 NOTE — ASSESSMENT & PLAN NOTE
Patient and family note a history of white-coat hypertension. Suspect he will likely be able to return to his previous home antihypertensive regimen on discharge. Encouraged patient to continue home BP monitoring after discharge.

## 2024-09-19 NOTE — ASSESSMENT & PLAN NOTE
Chronic, controlled. Latest blood pressure and vitals reviewed-     Temp:  [96.9 °F (36.1 °C)-98.9 °F (37.2 °C)]   Pulse:  []   Resp:  [16-18]   BP: (160-203)/(72-90)   SpO2:  [92 %-100 %] .     Home regimen is currently just coreg 12.5 mg BID. He had stopped home nifedipine 60 mg daily due to hypotension after initiation of the beta-blocker.    Suspect that once pain is improved that he will not need long-term escalated doses of his antihypertensive regimen. Will plan for short-acting PO hydralazine in the interim with small increases in home regimen if needed for additional BP control.    9/19: Patient's BP measured in the AM found to be low 200's systolic despite the patient reporting minimal pain. Additionally, he had received his AM dose of coreg 12.5 mg as well as 50 mg PO hydralazine three hours prior. Will slightly increase coreg dose.    -- Continue coreg 25 mg BID while inpatient (suspect he will be able to revert back to 12.5 mg BID on discharge)  -- Continue PRN hydralazine 50 mg TID for SBP > 180  -- Ongoing aggressive pain control   -- Tylenol changed from 650 mg QID to 1,000 mg TID

## 2024-09-19 NOTE — PROGRESS NOTES
[facility]  Urology  Progress Note    Patient Name: Rhett Johnson  MRN: [id number]  Admission Date: 9/16/2024  Hospital Length of Stay: 3 days  Code Status: Full Code   Attending Provider: Hari Salguero MD   Primary Care Physician: Sue Campos MD    Subjective:     HPI:  Rhett Johnosn is a 73 y.o. male  With [id number] urothelial cell carcinoma of the bladder.       The patient initially presented with prostate cancer and urinary incontinence s/p radical retropubic prostatectomy in 2001. In 07/2024 he presented to the ED with gross hematuria. Subsequent work up revealed asymmetric bladder wall thickening and urachal remnant. Underwent TURBT with bilateral retrograde pyelogram and left ureteral stent placement on 7/30/24. Path returned as HGMIBC.      Upper tract imaging (modality bilateral retrograde pyelogram, 07/30/24) - There was left hydroureteronephrosis down to the level of the bladder.  This was grade 3 hydronephrosis.  The distal ureter was evaluated with the semi rigid 6.8 Occitan self-dilating Olympus ureteroscope.  There was no evidence of tumor involvement of the ureteral mucosa.      TURBT (07/30/24) - Urinary bladder, transurethral resection of bladder tumor (TURBT):   - High-grade papillary urothelial carcinoma   - Muscularis propria is present and is involved by carcinoma   - Lymphovascular and perineural invasion are identified   - See synoptic report below   - Mismatch repair protein testing is pending and results will be reported in a supplemental report      CT C/A/P-8/6/2024- no nodes or metastasis.  Bone scan-8/14/2024- no osseous metastasis.  Creatinine-1.9 (eGFR 37), albumin 2.7.     Due to renal function not felt to be a candidate for platinum based chemotherapy.    Interval History: No acute events overnight. He became mildly hypertensive. The patient reports having a BM overnight along with copious flatus. After he states his abdomen feels significantly better. He  "was able to get out of bed to chair twice however did not walk in the hallway.     Review of Systems  Objective:     Temp:  [96.9 °F (36.1 °C)-99.2 °F (37.3 °C)] 98.4 °F (36.9 °C)  Pulse:  [] 89  Resp:  [16-18] 18  SpO2:  [92 %-100 %] 96 %  BP: (147-203)/(61-88) 162/72     Body mass index is 24.39 kg/m².           Drains       Drain  Duration                  Closed/Suction Drain 09/16/24 1751 Left Abdomen Bulb 19 Fr. <1 day         Ureteral Drain/Stent 09/16/24 1652 Left ureter 7.2 Fr. <1 day         Ureteral Drain/Stent 09/16/24 1652 Right ureter 7.2 Fr. <1 day         Urostomy 09/16/24 1751 ileal conduit RUQ <1 day                     Physical Exam  Constitutional:       Appearance: Normal appearance.   HENT:      Head: Normocephalic and atraumatic.   Abdominal:      General: Abdomen is flat. There is distension.      Palpations: Abdomen is soft.      Tenderness: There is no guarding or rebound.      Comments: Ostomy is healthy appearing covered in appliance.   Stents in place.  LLQ IVANIA draining SS output.   Incisions are clean dry and intact.   Skin:     General: Skin is warm.   Neurological:      General: No focal deficit present.      Mental Status: He is alert and oriented to person, place, and time.   Psychiatric:         Mood and Affect: Mood normal.           Significant Labs:    BMP:  Recent Labs   Lab 09/18/24  2018 09/18/24  2352 09/19/24  0211   * 135* 135*  135*   K 4.2 4.8 4.0  4.0    105 105  105   CO2 23 22* 22*  22*   BUN 16 18 17  17   CREATININE 1.3 1.3 1.2  1.2   CALCIUM 8.8 9.0 8.5*  8.5*       CBC:   Recent Labs   Lab 09/18/24  0209 09/18/24  1810 09/19/24  0211   WBC 10.02 16.38* 12.05   HGB 6.9* 10.9* 9.5*   HCT 22.1* 33.9* 29.3*    184 195       Blood Culture: No results for input(s): "LABBLOO" in the last 168 hours.  Urine Culture: No results for input(s): "LABURIN" in the last 168 hours.  Urine Studies: No results for input(s): "COLORU", "APPEARANCEUA", " ""PHUR", "SPECGRAV", "PROTEINUA", "GLUCUA", "KETONESU", "BILIRUBINUA", "OCCULTUA", "NITRITE", "UROBILINOGEN", "LEUKOCYTESUR", "RBCUA", "WBCUA", "BACTERIA", "SQUAMEPITHEL", "HYALINECASTS" in the last 168 hours.    Invalid input(s): "WRIGHTSUR"    Significant Imaging:  All pertinent imaging results/findings from the past 24 hours have been reviewed.                  Assessment/Plan:     Malignant neoplasm of overlapping sites of bladder  Monty Johnson is a 73 year old who is s/p open radical cystectomy and ileal conduit creation.     - Hemoglobin stable at 9.5 s/p two units of pRBC yesterday morning   - BID suppositories   - Walker ordered   - Diet: Clear Liquid Diet   - Multimodal pain control   - Bowel prep: Miralax, Suppositories, Senna-docusate   - PT/OT consulted, he was not seen yesterday due to pain and was receiving blood at the time   - Wound care consulted   - Ambulation in the halls x3   - Encourage incentive spirometer usage   - Delirium precautions    Dispo - Continue inpatient care         VTE Risk Mitigation (From admission, onward)           Ordered     heparin (porcine) injection 5,000 Units  Every 8 hours         09/16/24 1818     IP VTE HIGH RISK PATIENT  Once         09/16/24 1818     Place sequential compression device  Until discontinued         09/16/24 1818                    Rashmi Gomez MD  Urology  [facility]  "

## 2024-09-20 VITALS
HEART RATE: 91 BPM | RESPIRATION RATE: 20 BRPM | DIASTOLIC BLOOD PRESSURE: 67 MMHG | SYSTOLIC BLOOD PRESSURE: 131 MMHG | WEIGHT: 170 LBS | BODY MASS INDEX: 24.34 KG/M2 | OXYGEN SATURATION: 93 % | HEIGHT: 70 IN | TEMPERATURE: 98 F

## 2024-09-20 LAB
ANION GAP SERPL CALC-SCNC: 8 MMOL/L (ref 8–16)
BASOPHILS # BLD AUTO: 0.02 K/UL (ref 0–0.2)
BASOPHILS NFR BLD: 0.2 % (ref 0–1.9)
BUN SERPL-MCNC: 14 MG/DL (ref 8–23)
BUN SERPL-MCNC: 14 MG/DL (ref 8–23)
BUN SERPL-MCNC: 16 MG/DL (ref 8–23)
CALCIUM SERPL-MCNC: 8.5 MG/DL (ref 8.7–10.5)
CALCIUM SERPL-MCNC: 8.5 MG/DL (ref 8.7–10.5)
CALCIUM SERPL-MCNC: 8.6 MG/DL (ref 8.7–10.5)
CHLORIDE SERPL-SCNC: 105 MMOL/L (ref 95–110)
CHLORIDE SERPL-SCNC: 105 MMOL/L (ref 95–110)
CHLORIDE SERPL-SCNC: 106 MMOL/L (ref 95–110)
CO2 SERPL-SCNC: 23 MMOL/L (ref 23–29)
CREAT SERPL-MCNC: 1.2 MG/DL (ref 0.5–1.4)
DIFFERENTIAL METHOD BLD: ABNORMAL
EOSINOPHIL # BLD AUTO: 0.4 K/UL (ref 0–0.5)
EOSINOPHIL NFR BLD: 4 % (ref 0–8)
ERYTHROCYTE [DISTWIDTH] IN BLOOD BY AUTOMATED COUNT: 13.8 % (ref 11.5–14.5)
EST. GFR  (NO RACE VARIABLE): >60 ML/MIN/1.73 M^2
GLUCOSE SERPL-MCNC: 100 MG/DL (ref 70–110)
GLUCOSE SERPL-MCNC: 100 MG/DL (ref 70–110)
GLUCOSE SERPL-MCNC: 105 MG/DL (ref 70–110)
HCT VFR BLD AUTO: 26.6 % (ref 40–54)
HGB BLD-MCNC: 8.6 G/DL (ref 14–18)
IMM GRANULOCYTES # BLD AUTO: 0.04 K/UL (ref 0–0.04)
IMM GRANULOCYTES NFR BLD AUTO: 0.4 % (ref 0–0.5)
LYMPHOCYTES # BLD AUTO: 1.4 K/UL (ref 1–4.8)
LYMPHOCYTES NFR BLD: 13.8 % (ref 18–48)
MAGNESIUM SERPL-MCNC: 1.8 MG/DL (ref 1.6–2.6)
MCH RBC QN AUTO: 29.2 PG (ref 27–31)
MCHC RBC AUTO-ENTMCNC: 32.3 G/DL (ref 32–36)
MCV RBC AUTO: 90 FL (ref 82–98)
MONOCYTES # BLD AUTO: 0.8 K/UL (ref 0.3–1)
MONOCYTES NFR BLD: 7.9 % (ref 4–15)
NEUTROPHILS # BLD AUTO: 7.7 K/UL (ref 1.8–7.7)
NEUTROPHILS NFR BLD: 73.7 % (ref 38–73)
NRBC BLD-RTO: 0 /100 WBC
PHOSPHATE SERPL-MCNC: 2.7 MG/DL (ref 2.7–4.5)
PLATELET # BLD AUTO: 204 K/UL (ref 150–450)
PMV BLD AUTO: 10.2 FL (ref 9.2–12.9)
POCT GLUCOSE: 112 MG/DL (ref 70–110)
POTASSIUM SERPL-SCNC: 3.4 MMOL/L (ref 3.5–5.1)
POTASSIUM SERPL-SCNC: 3.4 MMOL/L (ref 3.5–5.1)
POTASSIUM SERPL-SCNC: 3.6 MMOL/L (ref 3.5–5.1)
RBC # BLD AUTO: 2.95 M/UL (ref 4.6–6.2)
SODIUM SERPL-SCNC: 136 MMOL/L (ref 136–145)
SODIUM SERPL-SCNC: 136 MMOL/L (ref 136–145)
SODIUM SERPL-SCNC: 137 MMOL/L (ref 136–145)
WBC # BLD AUTO: 10.37 K/UL (ref 3.9–12.7)

## 2024-09-20 PROCEDURE — 97116 GAIT TRAINING THERAPY: CPT | Mod: CQ

## 2024-09-20 PROCEDURE — 80048 BASIC METABOLIC PNL TOTAL CA: CPT | Performed by: STUDENT IN AN ORGANIZED HEALTH CARE EDUCATION/TRAINING PROGRAM

## 2024-09-20 PROCEDURE — 25000003 PHARM REV CODE 250: Performed by: UROLOGY

## 2024-09-20 PROCEDURE — 36415 COLL VENOUS BLD VENIPUNCTURE: CPT | Performed by: STUDENT IN AN ORGANIZED HEALTH CARE EDUCATION/TRAINING PROGRAM

## 2024-09-20 PROCEDURE — 25000003 PHARM REV CODE 250: Performed by: STUDENT IN AN ORGANIZED HEALTH CARE EDUCATION/TRAINING PROGRAM

## 2024-09-20 PROCEDURE — 84100 ASSAY OF PHOSPHORUS: CPT | Performed by: STUDENT IN AN ORGANIZED HEALTH CARE EDUCATION/TRAINING PROGRAM

## 2024-09-20 PROCEDURE — 63600175 PHARM REV CODE 636 W HCPCS: Performed by: STUDENT IN AN ORGANIZED HEALTH CARE EDUCATION/TRAINING PROGRAM

## 2024-09-20 PROCEDURE — 25000003 PHARM REV CODE 250

## 2024-09-20 PROCEDURE — 25000003 PHARM REV CODE 250: Mod: JZ,JG

## 2024-09-20 PROCEDURE — 83735 ASSAY OF MAGNESIUM: CPT | Performed by: STUDENT IN AN ORGANIZED HEALTH CARE EDUCATION/TRAINING PROGRAM

## 2024-09-20 PROCEDURE — 85025 COMPLETE CBC W/AUTO DIFF WBC: CPT | Performed by: STUDENT IN AN ORGANIZED HEALTH CARE EDUCATION/TRAINING PROGRAM

## 2024-09-20 PROCEDURE — A4216 STERILE WATER/SALINE, 10 ML: HCPCS | Performed by: UROLOGY

## 2024-09-20 PROCEDURE — 97535 SELF CARE MNGMENT TRAINING: CPT | Mod: CO

## 2024-09-20 PROCEDURE — 97530 THERAPEUTIC ACTIVITIES: CPT | Mod: CQ

## 2024-09-20 RX ORDER — CALCIUM GLUCONATE 20 MG/ML
1 INJECTION, SOLUTION INTRAVENOUS ONCE
Status: COMPLETED | OUTPATIENT
Start: 2024-09-20 | End: 2024-09-20

## 2024-09-20 RX ORDER — OXYCODONE HYDROCHLORIDE 5 MG/1
5 TABLET ORAL EVERY 4 HOURS PRN
Qty: 10 TABLET | Refills: 0 | Status: SHIPPED | OUTPATIENT
Start: 2024-09-20

## 2024-09-20 RX ORDER — SODIUM,POTASSIUM PHOSPHATES 280-250MG
1 POWDER IN PACKET (EA) ORAL ONCE
Status: DISCONTINUED | OUTPATIENT
Start: 2024-09-20 | End: 2024-09-20 | Stop reason: HOSPADM

## 2024-09-20 RX ORDER — ACETAMINOPHEN 500 MG
500 TABLET ORAL EVERY 6 HOURS PRN
Qty: 56 TABLET | Refills: 0 | Status: SHIPPED | OUTPATIENT
Start: 2024-09-20 | End: 2024-10-04

## 2024-09-20 RX ORDER — POLYETHYLENE GLYCOL 3350 17 G/17G
17 POWDER, FOR SOLUTION ORAL DAILY
Qty: 510 G | Refills: 0 | Status: SHIPPED | OUTPATIENT
Start: 2024-09-20

## 2024-09-20 RX ORDER — IBUPROFEN 800 MG/1
800 TABLET ORAL EVERY 6 HOURS PRN
Qty: 56 TABLET | Refills: 0 | Status: SHIPPED | OUTPATIENT
Start: 2024-09-20 | End: 2024-10-04

## 2024-09-20 RX ORDER — ENOXAPARIN SODIUM 100 MG/ML
40 INJECTION SUBCUTANEOUS DAILY
Qty: 9.6 ML | Refills: 0 | Status: SHIPPED | OUTPATIENT
Start: 2024-09-20

## 2024-09-20 RX ADMIN — CYCLOBENZAPRINE HYDROCHLORIDE 10 MG: 5 TABLET, FILM COATED ORAL at 05:09

## 2024-09-20 RX ADMIN — Medication 10 ML: at 05:09

## 2024-09-20 RX ADMIN — HEPARIN SODIUM 5000 UNITS: 5000 INJECTION INTRAVENOUS; SUBCUTANEOUS at 05:09

## 2024-09-20 RX ADMIN — CALCIUM GLUCONATE 1 G: 20 INJECTION, SOLUTION INTRAVENOUS at 10:09

## 2024-09-20 RX ADMIN — FAMOTIDINE 20 MG: 10 INJECTION, SOLUTION INTRAVENOUS at 09:09

## 2024-09-20 RX ADMIN — GABAPENTIN 300 MG: 300 CAPSULE ORAL at 05:09

## 2024-09-20 RX ADMIN — ACETAMINOPHEN 1000 MG: 500 TABLET ORAL at 05:09

## 2024-09-20 RX ADMIN — Medication 10 ML: at 12:09

## 2024-09-20 RX ADMIN — ATORVASTATIN CALCIUM 80 MG: 40 TABLET, FILM COATED ORAL at 09:09

## 2024-09-20 NOTE — DISCHARGE INSTRUCTIONS
What to expect with your Cystectomy with urinary Diversion.  [facility]    After surgery  You will have a drain that is shaped like a grenade and put to suction. It will be removed over the next couple of days.  You will have stents that are in your ureters draining your kidneys.  If you have a neobladder or [place] pouch, these will come out on a separate part of your skin and they will eventually be removed through your skin.  If you have an ileal conduit, they will come out through the conduit stoma  You may have a red rubber catheter which is used to drain the pouch or conduit  If you have a an [place] pouch it will be coming out of your catheterizable stoma and this will be removed over a few days  If you have a conduit, it will be coming out of your conduit and will be removed in a few days.  You may also possibly have another drain or catheter from your pouch or neobladder.     NO ONE IS TO REMOVE THIS CATHETER OR CHANGE THE CATHETER OTHER THAN SOMEONE FROM [facility].  If you have to go to the ER because your catheter is not draining, come to the [facility] ER if possible and they will call us if they are not able to irrigate your catheter.  If you live out of town and have to go to a local ER, then DO NOT let that doctor remove your catheter. If they are unable to irrigate the catheter or are having troubles with it, have them page the [facility] resident on call immediately at [phone] to help answer any questions.  You will have a midline incision with staples after surgery where the bladder was removed and the surgery was performed.   Over the next couple days after surgery we expect and hope that you will:  Walk - walking helps get the bowels moving. Also after your surgery, you are at a risk for a deep venous thrombosis (which is a clot in the legs that can form by remaining inactive or still for extended periods of time) and this can travel to your lungs and  make you feel short of breath. This is a very serious condition. Walking helps prevent a DVT from occurring.  Eat - we will advance you slowly and you will have only sips until you start having active bowel sounds and passing gas. It is important to have good nutrition for healing and we will actively encourage you to take in meals with high protein.  Use your incentive spirometer - this is the breathing apparatus that helps you expand your lungs. If and when you have pain you will not want to take deep breaths. But if you dont take deep breaths, you are at risk for pneumonia. The incentive spirometer will help prevent this from occurring by expanding your lungs.  Symptoms you may experience immediately post-op:  Bloating and/or shoulder pain with laparoscopic procedures - when we do this operation, we fill up your abdominal cavity with gas to better help us visualize the organs and allow our instruments to fit. After the surgery, not all the air can be removed and your body will eventually absorb this small amount of air. However this can make you feel bloated. In addition, when you sit up, the air can sit right under a muscle (the diaphragm) which has connecting nerves to the shoulders, which could explain why you have shoulder pain.  Do not expect necessarily to have gas or to have a bowel movement - this goes along with the bloating, you may feel like you want to pass gas or have a bowel movement but you cant. This is normal and you will feel like this for a couple days. There are no pills to help with this. Small walks throughout the day should help with this.  Pain - Immediately post op we will give you a button for your pain control. When you start tolerating a regular diet, we will change you over to medicines by mouth. Your pain should be able to be controlled with medicines by mouth that we prescribe. It is important for you to tell us if you are on any pain medications at home before the surgery as you  may need stronger pain meds while in the hospital.  You can go home when:  Pain is controlled with medicines by mouth  You are able to walk without difficulty or pain  You are tolerating a regulating diet  Your catheters are draining freely  You have learned how to change your pouch (if you have one)  You have learned how to irrigate your pouch (if you have one)  You are having gas and bowel movements  When you go home:  Catheter care  Blood in your urine (hematuria) is normal. However if you are having clots or your catheter stops draining and you are experiencing abdominal pain, go to the ER.  Activity  Continue to walk - small walks throughout the day are better than one long walk.   Do not lift anything greater than 8 pounds for 6 weeks - we want your abdominal wall muscles to heal.  Bowel Movements - Do not strain to have a bowel movement - the pain medicines will make you constipated. That is why we also ask you to take colace 2-3 x per day to help keep your bowels regular. If you are still having trouble, then you can also add Miralax once a day. Do not take any stool softeners if you are having diarrhea.  Drain - If you have a drain (not your catheter, but a separate drain) then record the output and bring it with you to your next appointment  Smoking - If you smoke, we encourage you to STOP. Smoking interferes with the healing process and your prolong your healing with continued smoking.  Driving - Do not drive while you are on pain meds or with your catheter in place.  Bathing - If you do not have a drain, you can shower 48 hours after your surgery. If you do have a drain, sponge bathe only until the drain is out.  Dressing - you can remove the dressings if there is no drainage or change them as needed if there is. The little sterile band-aid strips will fall off on their own in 10-14 days. If they have not fallen off then you can remove them yourself.  Restarting medicines -especially blood thinners  (Aspirin,Plavix, Coumadin), Fish Oil, discuss this with your physician prior to discharge  When to return to the ER  Fever - If you have a fever >101.5, this could be due to a number of reasons such as infection of the urine or incision. If your catheter has been removed, you could possibly have a leak. It would be best to come to the ER so they can better evaluate you.  Severe pain - pain is expected, but severe or new onset of pain is not normal.   Inability to tolerate food or liquid with nausea and vomiting - it would be best to go to the ER for them to better evaluate you.   Weakness

## 2024-09-20 NOTE — SUBJECTIVE & OBJECTIVE
Interval History: No acute overnight events. BP well controlled this morning at 131/67. Will plan for patient to return to home dose of Coreg on discharge. Discussed ongoing BP monitoring at home. Patient plans to record his blood pressure and follow-up with his cardiologist for ongoing antihypertensive titration after discharge.    Review of Systems   Constitutional:  Positive for activity change. Negative for chills, fatigue and fever.   HENT:  Negative for rhinorrhea and sore throat.    Respiratory:  Negative for cough, shortness of breath and wheezing.    Cardiovascular:  Negative for chest pain, palpitations and leg swelling.   Gastrointestinal:  Negative for abdominal pain, diarrhea and nausea.   Genitourinary:  Negative for dysuria and hematuria.   Musculoskeletal:  Negative for back pain.   Skin:  Negative for rash and wound.   Neurological:  Negative for tremors and headaches.   Psychiatric/Behavioral:  Negative for agitation and confusion.      Objective:     Vital Signs (Most Recent):  Temp: 98.3 °F (36.8 °C) (09/20/24 0728)  Pulse: 91 (09/20/24 0728)  Resp: 20 (09/20/24 0728)  BP: 131/67 (09/20/24 0728)  SpO2: (!) 93 % (09/20/24 0728) Vital Signs (24h Range):  Temp:  [98.2 °F (36.8 °C)-98.7 °F (37.1 °C)] 98.3 °F (36.8 °C)  Pulse:  [] 91  Resp:  [18-20] 20  SpO2:  [92 %-98 %] 93 %  BP: (131-204)/(67-90) 131/67     Weight: 77.1 kg (170 lb)  Body mass index is 24.39 kg/m².    Intake/Output Summary (Last 24 hours) at 9/20/2024 1039  Last data filed at 9/19/2024 2131  Gross per 24 hour   Intake 650 ml   Output 900 ml   Net -250 ml         Physical Exam  Constitutional:       General: He is not in acute distress.     Appearance: Normal appearance.   HENT:      Head: Normocephalic and atraumatic.      Right Ear: External ear normal.      Left Ear: External ear normal.      Nose: No congestion or rhinorrhea.      Mouth/Throat:      Mouth: Mucous membranes are moist.      Pharynx: Oropharynx is clear.    Eyes:      Extraocular Movements: Extraocular movements intact.      Conjunctiva/sclera: Conjunctivae normal.   Cardiovascular:      Rate and Rhythm: Normal rate and regular rhythm.      Pulses: Normal pulses.      Heart sounds: Normal heart sounds. No murmur heard.  Pulmonary:      Effort: Pulmonary effort is normal.      Breath sounds: Normal breath sounds. No wheezing or rales.   Abdominal:      General: Abdomen is flat. There is distension.      Palpations: Abdomen is soft.      Tenderness: There is abdominal tenderness (Mildly tender over incision site). There is no guarding.   Musculoskeletal:         General: No swelling.      Right lower leg: No edema.      Left lower leg: No edema.   Skin:     General: Skin is warm and dry.      Findings: No rash.   Neurological:      Mental Status: He is alert and oriented to person, place, and time. Mental status is at baseline.      Motor: No weakness.   Psychiatric:         Mood and Affect: Mood normal.         Behavior: Behavior normal.       Significant Labs: All pertinent labs within the past 24 hours have been reviewed.  CBC:   Recent Labs   Lab 09/18/24  1810 09/19/24  0211 09/20/24  0224   WBC 16.38* 12.05 10.37   HGB 10.9* 9.5* 8.6*   HCT 33.9* 29.3* 26.6*    195 204     CMP:   Recent Labs   Lab 09/19/24  1927 09/19/24  2354 09/20/24  0224   * 137 136  136   K 4.0 3.6 3.4*  3.4*    106 105  105   CO2 24 23 23  23   * 105 100  100   BUN 18 16 14  14   CREATININE 1.3 1.2 1.2  1.2   CALCIUM 9.1 8.6* 8.5*  8.5*   ANIONGAP 6* 8 8  8       Significant Imaging: I have reviewed all pertinent imaging results/findings within the past 24 hours.

## 2024-09-20 NOTE — PROGRESS NOTES
[facility] Medicine  Progress Note    Patient Name: Rhett Johnson  MRN: [id number]  Patient Class: IP- Inpatient   Admission Date: 9/16/2024  Length of Stay: 4 days  Attending Physician: Hari Salguero MD  Primary Care Provider: Sue Campos MD      Subjective:     Principal Problem:Malignant neoplasm of overlapping sites of bladder      HPI:  The patient is a 73 year old male with a history of HTN, COPD, HLD, prostate Ca, CVA, GERD, CAD, CKD III, neuropathy who is post-op day 2 following radical prostatectomy with bilateral pelvic node dissection and ileal conduit. Medicine consulted for blood pressure management recommendations.     The patient's home regimen includes coreg 12.5 mg BID (started due to history of PVC's noted on cardiac monitor). After addition of the beta-blocker his home BP was noted to be low on both the coreg and Nifedipine 60 mg daily that he had previously been on. The Nifedipine was stopped and he was continued on Coreg. The patient's wife provides the added information that at home his typical blood pressure is about 130-160 systolic and 70-80 diastolic.     At the time of evaluation the patient's BP is 203/88 and 190/76 on recheck. He reports abdominal pain from the surgery and received PRN oxycodone 5 mg immediately after BP check. Analgesic options are limited due to concern of developing ileus on abdominal X-ray.    Overview/Hospital Course:  No notes on file    Interval History: No acute overnight events. BP well controlled this morning at 131/67. Will plan for patient to return to home dose of Coreg on discharge. Discussed ongoing BP monitoring at home. Patient plans to record his blood pressure and follow-up with his cardiologist for ongoing antihypertensive titration after discharge.    Review of Systems   Constitutional:  Positive for activity change. Negative for chills, fatigue and fever.   HENT:  Negative for rhinorrhea and sore throat.     Respiratory:  Negative for cough, shortness of breath and wheezing.    Cardiovascular:  Negative for chest pain, palpitations and leg swelling.   Gastrointestinal:  Negative for abdominal pain, diarrhea and nausea.   Genitourinary:  Negative for dysuria and hematuria.   Musculoskeletal:  Negative for back pain.   Skin:  Negative for rash and wound.   Neurological:  Negative for tremors and headaches.   Psychiatric/Behavioral:  Negative for agitation and confusion.      Objective:     Vital Signs (Most Recent):  Temp: 98.3 °F (36.8 °C) (09/20/24 0728)  Pulse: 91 (09/20/24 0728)  Resp: 20 (09/20/24 0728)  BP: 131/67 (09/20/24 0728)  SpO2: (!) 93 % (09/20/24 0728) Vital Signs (24h Range):  Temp:  [98.2 °F (36.8 °C)-98.7 °F (37.1 °C)] 98.3 °F (36.8 °C)  Pulse:  [] 91  Resp:  [18-20] 20  SpO2:  [92 %-98 %] 93 %  BP: (131-204)/(67-90) 131/67     Weight: 77.1 kg (170 lb)  Body mass index is 24.39 kg/m².    Intake/Output Summary (Last 24 hours) at 9/20/2024 1039  Last data filed at 9/19/2024 2131  Gross per 24 hour   Intake 650 ml   Output 900 ml   Net -250 ml         Physical Exam  Constitutional:       General: He is not in acute distress.     Appearance: Normal appearance.   HENT:      Head: Normocephalic and atraumatic.      Right Ear: External ear normal.      Left Ear: External ear normal.      Nose: No congestion or rhinorrhea.      Mouth/Throat:      Mouth: Mucous membranes are moist.      Pharynx: Oropharynx is clear.   Eyes:      Extraocular Movements: Extraocular movements intact.      Conjunctiva/sclera: Conjunctivae normal.   Cardiovascular:      Rate and Rhythm: Normal rate and regular rhythm.      Pulses: Normal pulses.      Heart sounds: Normal heart sounds. No murmur heard.  Pulmonary:      Effort: Pulmonary effort is normal.      Breath sounds: Normal breath sounds. No wheezing or rales.   Abdominal:      General: Abdomen is flat. There is distension.      Palpations: Abdomen is soft.       Tenderness: There is abdominal tenderness (Mildly tender over incision site). There is no guarding.   Musculoskeletal:         General: No swelling.      Right lower leg: No edema.      Left lower leg: No edema.   Skin:     General: Skin is warm and dry.      Findings: No rash.   Neurological:      Mental Status: He is alert and oriented to person, place, and time. Mental status is at baseline.      Motor: No weakness.   Psychiatric:         Mood and Affect: Mood normal.         Behavior: Behavior normal.       Significant Labs: All pertinent labs within the past 24 hours have been reviewed.  CBC:   Recent Labs   Lab 09/18/24  1810 09/19/24  0211 09/20/24  0224   WBC 16.38* 12.05 10.37   HGB 10.9* 9.5* 8.6*   HCT 33.9* 29.3* 26.6*    195 204     CMP:   Recent Labs   Lab 09/19/24  1927 09/19/24  2354 09/20/24  0224   * 137 136  136   K 4.0 3.6 3.4*  3.4*    106 105  105   CO2 24 23 23  23   * 105 100  100   BUN 18 16 14  14   CREATININE 1.3 1.2 1.2  1.2   CALCIUM 9.1 8.6* 8.5*  8.5*   ANIONGAP 6* 8 8  8       Significant Imaging: I have reviewed all pertinent imaging results/findings within the past 24 hours.    Assessment/Plan:      White coat syndrome with diagnosis of hypertension  Patient and family note a history of white-coat hypertension. Suspect he will likely be able to return to his previous home antihypertensive regimen on discharge. Encouraged patient to continue home BP monitoring after discharge.    Essential hypertension  Chronic, controlled. Latest blood pressure and vitals reviewed-     Temp:  [98.2 °F (36.8 °C)-98.7 °F (37.1 °C)]   Pulse:  []   Resp:  [18-20]   BP: (131-204)/(67-90)   SpO2:  [92 %-98 %] .     Home regimen is currently just coreg 12.5 mg BID. He had stopped home nifedipine 60 mg daily due to hypotension after initiation of the beta-blocker.    Suspect that once pain is improved that he will not need long-term escalated doses of his  antihypertensive regimen. Will plan for short-acting PO hydralazine in the interim with small increases in home regimen if needed for additional BP control.    9/20: BP well controlled this AM. He received Coreg 25 mg last night as well as hydralazine 50 mg PO PRN at 9 PM yesterday due to systolics ~ 200. Recommend continuing Coreg 25 mg BID while inpatient, on discharge recommend decreasing back to home regimen of Coreg 12.5 mg BID. Instructed patient in detail to continue home BP checks and to maintain a log of blood pressures daily. Patient to follow-up with outpatient cardiologist for ongoing antihypertensive management after discharge. Patient in agreement with the plan.    -- Continue coreg 25 mg BID while inpatient; On discharge decrease back to coreg 12.5 mg BID  -- Continue PRN hydralazine 50 mg TID for SBP > 180 while inpatient  -- Ongoing aggressive pain control per primary      VTE Risk Mitigation (From admission, onward)           Ordered     heparin (porcine) injection 5,000 Units  Every 8 hours         09/16/24 1818     IP VTE HIGH RISK PATIENT  Once         09/16/24 1818     Place sequential compression device  Until discontinued         09/16/24 1818                    Discharge Planning   PHOEBE: 9/20/2024     Code Status: Full Code   Is the patient medically ready for discharge?:     Reason for patient still in hospital (select all that apply): Patient trending condition  Discharge Plan A: Home with family          Jeison Dorman MD  Department of Hospital Medicine   [facility]

## 2024-09-20 NOTE — SUBJECTIVE & OBJECTIVE
"Interval History: No acute events overnight. The patient reports that he ambulated in the hallways many times yesterday and overnight. Continues to have bowel movements and has been passing a copious amount of flatus. His abdominal pain has since resolved.     Review of Systems  Objective:     Temp:  [98.2 °F (36.8 °C)-98.7 °F (37.1 °C)] 98.7 °F (37.1 °C)  Pulse:  [] 98  Resp:  [18-20] 18  SpO2:  [92 %-99 %] 93 %  BP: (151-204)/(69-90) 165/71     Body mass index is 24.39 kg/m².           Drains       Drain  Duration                  Closed/Suction Drain 09/16/24 1751 Left Abdomen Bulb 19 Fr. <1 day         Ureteral Drain/Stent 09/16/24 1652 Left ureter 7.2 Fr. <1 day         Ureteral Drain/Stent 09/16/24 1652 Right ureter 7.2 Fr. <1 day         Urostomy 09/16/24 1751 ileal conduit RUQ <1 day                     Physical Exam  Constitutional:       Appearance: Normal appearance.   HENT:      Head: Normocephalic and atraumatic.   Abdominal:      General: Abdomen is flat.      Palpations: Abdomen is soft.      Tenderness: There is no guarding or rebound.      Comments: Ostomy is healthy appearing covered in appliance.   Stents in place.  Incisions are clean dry and intact.   Skin:     General: Skin is warm.   Neurological:      General: No focal deficit present.      Mental Status: He is alert and oriented to person, place, and time.   Psychiatric:         Mood and Affect: Mood normal.           Significant Labs:    BMP:  Recent Labs   Lab 09/19/24  1927 09/19/24  2354 09/20/24  0224   * 137 136  136   K 4.0 3.6 3.4*  3.4*    106 105  105   CO2 24 23 23  23   BUN 18 16 14  14   CREATININE 1.3 1.2 1.2  1.2   CALCIUM 9.1 8.6* 8.5*  8.5*       CBC:   Recent Labs   Lab 09/18/24  1810 09/19/24  0211 09/20/24  0224   WBC 16.38* 12.05 10.37   HGB 10.9* 9.5* 8.6*   HCT 33.9* 29.3* 26.6*    195 204       Blood Culture: No results for input(s): "LABBLOO" in the last 168 hours.  Urine Culture: No " "results for input(s): "LABURIN" in the last 168 hours.  Urine Studies: No results for input(s): "COLORU", "APPEARANCEUA", "PHUR", "SPECGRAV", "PROTEINUA", "GLUCUA", "KETONESU", "BILIRUBINUA", "OCCULTUA", "NITRITE", "UROBILINOGEN", "LEUKOCYTESUR", "RBCUA", "WBCUA", "BACTERIA", "SQUAMEPITHEL", "HYALINECASTS" in the last 168 hours.    Invalid input(s): "WRIGHTSUR"    Significant Imaging:  All pertinent imaging results/findings from the past 24 hours have been reviewed.                "

## 2024-09-20 NOTE — PT/OT/SLP PROGRESS
Occupational Therapy   Treatment    Name: Rhett Johnson  MRN: [id number]  Admitting Diagnosis:  Malignant neoplasm of overlapping sites of bladder  4 Days Post-Op    Recommendations:     Discharge Recommendations: Low Intensity Therapy  Discharge Equipment Recommendations:  none  Barriers to discharge:       Assessment:     Rhett Johnson is a 73 y.o. male with a medical diagnosis of Malignant neoplasm of overlapping sites of bladder.  He presents with the following performance deficits affecting function: weakness, impaired endurance, impaired functional mobility, impaired self care skills.     Rehab Prognosis:  Good; patient would benefit from acute skilled OT services to address these deficits and reach maximum level of function.       Plan:     Patient to be seen 3 x/week to address the above listed problems via self-care/home management, therapeutic activities, therapeutic exercises  Plan of Care Expires: 10/17/24  Plan of Care Reviewed with: patient    Subjective     Patient/Family Comments/goals: to improve function  Pain/Comfort:  Pain Rating 1: 0/10  Pain Rating Post-Intervention 1: 0/10    Objective:     Communicated with: RN prior to session.  Patient found  on toilet   with merlos catheter upon OT entry to room.    General Precautions: Standard, fall    Orthopedic Precautions:N/A  Braces: N/A  Respiratory Status: Room air     Occupational Performance:     Bed Mobility:    Up in chair     Functional Mobility/Transfers:  Patient completed Sit <> Stand Transfer with stand by assistance  with  no assistive device   Patient completed Toilet Transfer Step Transfer technique with stand by assistance with  no AD  Functional Mobility: pt ambulating ~10ft with SBA using no AD. No LOB or SOB noted.     Activities of Daily Living:  Lower Body Dressing: supervision to don and doff socks      [facility] 6 Click ADL: 21    Treatment & Education:  Pt educated on OT POC and frequency during hospital stay.   Pt educated on  proper hand placement and techniques for transfers to improve safety awareness.   Pt educated on importance of OOB activity to improve function and activity tolerance.  Addressed all patient questions/concerns within [facility] scope of practice.     Patient left up in chair with all lines intact, call button in reach, and RN notified    GOALS:   Multidisciplinary Problems       Occupational Therapy Goals          Problem: Occupational Therapy    Goal Priority Disciplines Outcome Interventions   Occupational Therapy Goal     OT, PT/OT Progressing    Description: Goals to be met by: 10/17/2024     Patient will increase functional independence with ADLs by performing:    UE Dressing with Modified [place].  LE Dressing with Modified [place].  Grooming while standing at sink with Set-up Assistance.  Toileting from toilet with Modified [place] for hygiene and clothing management.   Supine to sit with Modified [place].  Step transfer with Modified [place]  Toilet transfer to toilet with Modified [place].                         Time Tracking:     OT Date of Treatment: 09/20/24  OT Start Time: 0916  OT Stop Time: 0932  OT Total Time (min): 16 min    Billable Minutes:Self Care/Home Management 16    OT/CARITO: CARITO     Number of CARITO visits since last OT visit: 1    9/20/2024

## 2024-09-20 NOTE — PROGRESS NOTES
[facility]  Wound Care    Patient Name:  Rhett Johnson   MRN:  [id number]  Date: 9/20/2024  Diagnosis: Malignant neoplasm of overlapping sites of bladder    History:     Past Medical History:   Diagnosis Date    Anemia 09/09/2024    Asthma     Cancer     Carotid occlusion, left     100% blockage    COPD (chronic obstructive pulmonary disease)     Current tobacco use 08/20/2018    Deep vein thrombosis     Disorder of kidney and ureter     DVT (deep venous thrombosis)     Elevated WBC count 09/10/2024    Gross hematuria 07/30/2024    Hx of hepatitis     Hyperlipidemia     Hypertension     on medication monitoring    Hypertensive heart and chronic kidney disease with heart failure and stage 1 through stage 4 chronic kidney disease, or unspecified chronic kidney disease 08/29/2024    Documented on 01/03/2023      Peripheral artery disease     Prostate cancer 2001    Dr. Bai     PVCs (premature ventricular contractions) 09/19/2024    Previously symptomatic, now resolved on coreg         Social History     Socioeconomic History    Marital status:      Spouse name: Tammie   Tobacco Use    Smoking status: Former     Current packs/day: 1.00     Average packs/day: 1 pack/day for 51.0 years (51.0 ttl pk-yrs)     Types: Cigarettes    Smokeless tobacco: Never    Tobacco comments:     Smoked about 50-55 years about 1 pack per day.  Quit October 20, 2020.     Patient aware of smoking cessation program and aware of health risk due to smoking.    Substance and Sexual Activity    Alcohol use: Not Currently    Drug use: Never    Sexual activity: Not Currently     Partners: Female   Social History Narrative    Lives in [place] with his wife. He is retired from [email or web address]. 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      Social Determinants of Health     Financial Resource Strain: Low Risk  (9/17/2024)    Overall Financial Resource Strain (CARDIA)      Difficulty of Paying Living Expenses: Not hard at all   Food Insecurity: No Food Insecurity (9/17/2024)    Hunger Vital Sign     Worried About Running Out of Food in the Last Year: Never true     Ran Out of Food in the Last Year: Never true   Transportation Needs: No Transportation Needs (9/17/2024)    TRANSPORTATION NEEDS     Transportation : No   Physical Activity: Inactive (9/17/2024)    Exercise Vital Sign     Days of Exercise per Week: 0 days     Minutes of Exercise per Session: 0 min   Stress: No Stress Concern Present (9/17/2024)    [employer] [place] [employer] - Occupational Stress Questionnaire     Feeling of Stress : Not at all   Housing Stability: Low Risk  (9/17/2024)    Housing Stability Vital Sign     Unable to Pay for Housing in the Last Year: No     Homeless in the Last Year: No       Precautions:     Allergies as of 08/22/2024 - Reviewed 08/22/2024   Allergen Reaction Noted    Pcn [penicillins] Anaphylaxis 03/07/2016    Valium [diazepam] Anaphylaxis 01/11/2024       WOC Assessment Details/Treatment   Met with patient for ostomy training lesson.  Family at the bedside for lesson.  Pt gently removed the ostomy pouch. He performed stoma and nguyễn-stomal care. Pt measured stoma and cut  pouch to fit stoma, allowing, 1/8 inch clearance. He applied a hard convex coloplast pouch to stoma site without difficulty. Pouch connected to a drainage bag. Reviewed the reading materials and answered all questions.  Pt and pt's family verbalized understanding of when to alert MD of issues with stoma.  Discussed follow up plan of care and provided patient with contact name and numbers. Supplies for discharge at the bedside. Will follow-up with the patient on Monday.        09/20/24 1100        Urostomy 09/16/24 1751 ileal conduit RUQ   Date of First Assessment/Time of First Assessment: 09/16/24 1751   Present Prior to Hospital Arrival?: No  Inserted by: MD  Urostomy Type: ileal conduit  Location: RUQ    Stoma Appearance round;red;protruding above skin level;moist   Stoma Size (in) 33mm   Stomal Appliance 1 piece;No Leakage;Changed   Accessories/Skin Care cleansed w/ water;barrier substance over peristomal skin   Stoma Function yellow urine   Peristomal Skin other (see comments)  (blistered)   Tolerance no signs/symptoms of discomfort;requires minimal reinforcement   Treatment Bag change         09/20/2024

## 2024-09-20 NOTE — ASSESSMENT & PLAN NOTE
Monty Johnson is a 73 year old who is s/p open radical cystectomy and ileal conduit creation.     - Hemoglobin responded to 2 units of pRBC.  - Hemoglobin 8.6 from 9.5   - BID suppositories   - Walker ordered   - Diet: Regular Diet, low sodium. Impact AR shake with every meal.   - Multimodal pain control   - Bowel prep: Miralax, Suppositories, Senna-docusate   - PT/OT consulted, he was not seen yesterday due to pain and was receiving blood at the time   - Wound care consulted   - Ambulation in the halls x3   - Encourage incentive spirometer usage   - Delirium precautions    Dispo - Continue inpatient care

## 2024-09-20 NOTE — DISCHARGE SUMMARY
[facility] - [facility]  Urology  Discharge Summary      Patient Name: Rhett Johnson  MRN: [id number]  Admission Date: 9/16/2024  Hospital Length of Stay: 4 days  Discharge Date and Time:  09/20/2024 3:41 PM  Attending Physician: Ana att. providers found   Discharging Provider: Florencio Carrillo MD  Primary Care Physician: Sue Campos MD    HPI:   Rhett Johnson is a 73 y.o. male  With [id number] urothelial cell carcinoma of the bladder.       The patient initially presented with prostate cancer and urinary incontinence s/p radical retropubic prostatectomy in 2001. In 07/2024 he presented to the ED with gross hematuria. Subsequent work up revealed asymmetric bladder wall thickening and urachal remnant. Underwent TURBT with bilateral retrograde pyelogram and left ureteral stent placement on 7/30/24. Path returned as HGMIBC.      Upper tract imaging (modality bilateral retrograde pyelogram, 07/30/24) - There was left hydroureteronephrosis down to the level of the bladder.  This was grade 3 hydronephrosis.  The distal ureter was evaluated with the semi rigid 6.8 [place] self-dilating Olympus ureteroscope.  There was no evidence of tumor involvement of the ureteral mucosa.      TURBT (07/30/24) - Urinary bladder, transurethral resection of bladder tumor (TURBT):   - High-grade papillary urothelial carcinoma   - Muscularis propria is present and is involved by carcinoma   - Lymphovascular and perineural invasion are identified   - See synoptic report below   - Mismatch repair protein testing is pending and results will be reported in a supplemental report      CT C/A/P-8/6/2024- no nodes or metastasis.  Bone scan-8/14/2024- no osseous metastasis.  Creatinine-1.9 (eGFR 37), albumin 2.7.     Due to renal function not felt to be a candidate for platinum based chemotherapy.       Procedure(s) (LRB):  CREATION, ILEAL CONDUIT (N/A)  CYSTECTOMY, RADICAL (N/A)     Indwelling Lines/Drains at time of discharge:    Lines/Drains/Airways       Drain  Duration                  Ureteral Drain/Stent 09/16/24 1652 Left ureter 7.2 Fr. 3 days         Ureteral Drain/Stent 09/16/24 1652 Right ureter 7.2 Fr. 3 days         Urostomy 09/16/24 1751 ileal conduit RUQ 3 days                    Hospital Course (synopsis of major diagnoses, care, treatment, and services provided during the course of the hospital stay):   Patient was brought to the hospital for the above procedure.  The patient tolerated it well.  Post op, the patient's diet was advanced, their pain was controlled, and the patient was ambulating without problem.  Their drain was removed.  The patient will follow up in 2 weeks with Dr. Ross.       Goals of Care Treatment Preferences:  Code Status: Full Code    Living Will: Yes              Consults:   Consults (From admission, onward)          Status Ordering Provider     Inpatient consult to [facility] Medicine-General  Once        Provider:  (Not yet assigned)    Completed VICTOR MANUEL GRUBBS     Inpatient consult to Midline team  Once        Provider:  (Not yet assigned)    Completed RAYNA ROSS     IP consult to case management/social work  Once        Provider:  (Not yet assigned)    Acknowledged BERNA WILKES            Significant Diagnostic Studies:     Pending Diagnostic Studies:       Procedure Component Value Units Date/Time    Basic metabolic panel [[id number]]     Order Status: Sent Lab Status: No result     Specimen: Blood     Basic metabolic panel [[id number]]     Order Status: Sent Lab Status: No result     Specimen: Blood     Basic metabolic panel [[id number]] Collected: 09/18/24 0210    Order Status: Sent Lab Status: No result     Specimen: Blood     Basic metabolic panel [[id number]] Collected: 09/17/24 0014    Order Status: Sent Lab Status: No result     Specimen: Blood     Specimen to Pathology, Surgery Urology [[id number]] Collected: 09/16/24 1729    Order Status: Sent Lab Status: In process  Updated: 09/17/24 1010    Specimen: Tissue             Final Active Diagnoses:    Diagnosis Date Noted POA    PRINCIPAL PROBLEM:  Malignant neoplasm of overlapping sites of bladder [C67.8] 08/09/2024 Yes    White coat syndrome with diagnosis of hypertension [I10] 09/19/2024 Yes    Hypertensive urgency [I16.0] 09/19/2024 No    PVCs (premature ventricular contractions) [I49.3] 09/19/2024 Unknown    Chronic kidney disease, stage 3a [N18.31] 09/09/2024 Yes    Hyperlipidemia [E78.5] 12/06/2017 Yes    Essential hypertension [I10] 03/07/2016 Yes      Problems Resolved During this Admission:         Discharged Condition: good    Disposition: Home or Self Care    Follow Up:   Follow-up Information       Hari Salguero MD Follow up on 10/1/2024.    Specialty: Urology  Contact information:  [address]  [address]  [place] [address]  [phone]                           Patient Instructions:      Type & Screen   Standing Status: Future Number of Occurrences: 1 Standing Exp. Date: 10/21/25     Medications:  Reconciled Home Medications:      Medication List        START taking these medications      enoxaparin 40 mg/0.4 mL Syrg  Commonly known as: LOVENOX  Inject 0.4 mLs (40 mg total) into the skin once daily.     GAVILAX 17 gram/dose powder  Generic drug: polyethylene glycol  Use cap to measure 17 grams.  Dissolve as directed and take by mouth once daily.     ibuprofen 800 MG tablet  Commonly known as: ADVIL,MOTRIN  Take 1 tablet (800 mg total) by mouth every 6 (six) hours as needed for Pain.     oxyCODONE 5 MG immediate release tablet  Commonly known as: ROXICODONE  Take 1 tablet (5 mg total) by mouth every 4 (four) hours as needed for Pain.            CHANGE how you take these medications      * acetaminophen 80 MG Chew  Commonly known as: TYLENOL  Take by mouth.  What changed: Another medication with the same name was added. Make sure you understand how and when to take each.     * acetaminophen 500 MG  tablet  Commonly known as: TYLENOL  Take 1 tablet (500 mg total) by mouth every 6 (six) hours as needed for Pain.  What changed: You were already taking a medication with the same name, and this prescription was added. Make sure you understand how and when to take each.           * This list has 2 medication(s) that are the same as other medications prescribed for you. Read the directions carefully, and ask your doctor or other care provider to review them with you.                CONTINUE taking these medications      albuterol 90 mcg/actuation inhaler  Commonly known as: PROVENTIL/VENTOLIN HFA  Inhale 2 puffs into the lungs every 6 (six) hours as needed for Wheezing. Rescue     ascorbic acid (vitamin C) 100 MG tablet  Commonly known as: VITAMIN C  Take 100 mg by mouth.     aspirin 81 MG EC tablet  Commonly known as: ECOTRIN  Take 81 mg by mouth once daily.     carvediloL 12.5 MG tablet  Commonly known as: COREG     cholecalciferol (vitamin D3) 10 mcg (400 unit) Cap capsule  Take 1 tablet by mouth.     cyclobenzaprine 10 MG tablet  Commonly known as: FLEXERIL  Take 10 mg by mouth 3 (three) times daily.     furosemide 40 MG tablet  Commonly known as: LASIX  daily as needed.     gabapentin 300 MG capsule  Commonly known as: NEURONTIN  Take 300 mg by mouth 3 (three) times daily.     multivitamin per tablet  Commonly known as: THERAGRAN  Take 1 tablet by mouth once daily.     rosuvastatin 40 MG Tab  Commonly known as: CRESTOR  Take 40 mg by mouth.     umeclidinium 62.5 mcg/actuation inhalation capsule  Commonly known as: INCRUSE ELLIPTA  Inhale 1 Inhaler into the lungs once daily. Controller     zinc gluconate 50 mg tablet  Take 50 mg by mouth. 2 tablets per day            STOP taking these medications      sulfamethoxazole-trimethoprim 800-160mg 800-160 mg Tab  Commonly known as: BACTRIM DS              Time spent on the discharge of patient: 15 minutes    Florencio Carrillo MD  Urology  [facility]

## 2024-09-20 NOTE — PT/OT/SLP PROGRESS
Physical Therapy Treatment    Patient Name:  Rhett Johnson   MRN:  [id number]    Recommendations:     Discharge Recommendations: Low Intensity Therapy  Discharge Equipment Recommendations: none  Barriers to discharge: None    Assessment:     Rhett Johnson is a 73 y.o. male admitted with a medical diagnosis of Malignant neoplasm of overlapping sites of bladder.  He presents with the following impairments/functional limitations: weakness, impaired endurance, impaired self care skills, impaired functional mobility, gait instability, impaired balance, pain .Pt presents with improved overall functional mobility requiring decreased assistance and increased activity tolerance to perform functional mobility. Pt  would continue to benefit from skilled PT to address overall functional mobility, to return to functional baseline and goals. Goals remain appropriate.      Rehab Prognosis: Good; patient would benefit from acute skilled PT services to address these deficits and reach maximum level of function.    Recent Surgery: Procedure(s) (LRB):  CREATION, ILEAL CONDUIT (N/A)  CYSTECTOMY, RADICAL (N/A) 4 Days Post-Op    Plan:     During this hospitalization, patient to be seen 4 x/week to address the identified rehab impairments via gait training, therapeutic activities, therapeutic exercises and progress toward the following goals:    Plan of Care Expires:  10/17/24    Subjective     Patient/Family Comments/goals: I might go home today  Pain/Comfort:  Pain Rating 1: 0/10  Location 1: abdomen  Pain Addressed 1: Distraction, Cessation of Activity  Pain Rating Post-Intervention 1: 3/10      Objective:     Communicated with RN prior to session.  Patient found HOB elevated with  (peripheral IV; telemetry; SCD) upon PT entry to room.     General Precautions: Standard, fall  Orthopedic Precautions: N/A  Braces: N/A  Respiratory Status: Room air     Functional Mobility:  Bed Mobility:     Rolling Right: SBA  Scooting: SBA  Supine  to Sit: minimum assistance  Transfers:     Sit to Stand:  CGAwith rolling walker  Gait: 500 with RW and SBA for balance/safety. Pt. amb. with decreased step length/sandee and slightly unsteady gait. Vcs for upright posture and placement of AD      AM-PAC 6 CLICK MOBILITY  Turning over in bed (including adjusting bedclothes, sheets and blankets)?: 3  Sitting down on and standing up from a chair with arms (e.g., wheelchair, bedside commode, etc.): 3  Moving from lying on back to sitting on the side of the bed?: 3  Moving to and from a bed to a chair (including a wheelchair)?: 3  Need to walk in hospital room?: 3  Climbing 3-5 steps with a railing?: 3  Basic Mobility Total Score: 18       Treatment & Education:  Therapist provided instruction and educated of patient on progress, safety, d/c, PT POC,   proper body mechanics, energy conservation, and fall prevention strategies during tasks listed above, on the effects of prolonged immobility and the importance of performing OOB activity and exercises to promote healing and reduce recovery time     Updated white board with appropriate PT mobility information for medical team notification   Donned an extra gown   Pt encouraged to ambulate in hallways 3x/day with nursing or family assistance to improve endurance.   Pt safe to ambulate in hallway with RN or PCT assistance  Call nursing/pct to transfer to chair/use bathroom. Pt stated understanding  Bedside table in front of patient and area set up for function, convenience, and safety. RN aware of patient's mobility needs and status. Questions/concerns addressed within PTA scope of practice, patient with no further questions. Time was provided for active listening, discussion of health disposition, and discussion of safe discharge. Pt?verbalized?agreement .     Patient left up in chair with all lines intact, call button in reach, and nsg notified..  GOALS:   Multidisciplinary Problems       Physical Therapy Goals           Problem: Physical Therapy    Goal Priority Disciplines Outcome Goal Variances Interventions   Physical Therapy Goal     PT, PT/OT Progressing     Description: Goals to be met by: 10/1/2024     Patient will increase functional independence with mobility by performin. Supine to sit with Set-up [place]  2. Sit to supine with Set-up [place]  3. Sit to stand transfer with Supervision  4. Bed to chair transfer with Supervision using LRAD  5. Gait  x 200 feet with Supervision using LRAD.   6. Lower extremity exercise program x15 reps per handout, with supervision                         Time Tracking:     PT Received On: 24  PT Start Time: 825     PT Stop Time: 848  PT Total Time (min): 23 min     Billable Minutes: Gait Training 15 and Therapeutic Activity 8    Treatment Type: Treatment  PT/PTA: PTA     Number of PTA visits since last PT visit: 2     2024

## 2024-09-20 NOTE — ASSESSMENT & PLAN NOTE
Chronic, controlled. Latest blood pressure and vitals reviewed-     Temp:  [98.2 °F (36.8 °C)-98.7 °F (37.1 °C)]   Pulse:  []   Resp:  [18-20]   BP: (131-204)/(67-90)   SpO2:  [92 %-98 %] .     Home regimen is currently just coreg 12.5 mg BID. He had stopped home nifedipine 60 mg daily due to hypotension after initiation of the beta-blocker.    Suspect that once pain is improved that he will not need long-term escalated doses of his antihypertensive regimen. Will plan for short-acting PO hydralazine in the interim with small increases in home regimen if needed for additional BP control.    9/20: BP well controlled this AM. He received Coreg 25 mg last night as well as hydralazine 50 mg PO PRN at 9 PM yesterday due to systolics ~ 200. Recommend continuing Coreg 25 mg BID while inpatient, on discharge recommend decreasing back to home regimen of Coreg 12.5 mg BID. Instructed patient in detail to continue home BP checks and to maintain a log of blood pressures daily. Patient to follow-up with outpatient cardiologist for ongoing antihypertensive management after discharge. Patient in agreement with the plan.    -- Continue coreg 25 mg BID while inpatient; On discharge decrease back to coreg 12.5 mg BID  -- Continue PRN hydralazine 50 mg TID for SBP > 180 while inpatient  -- Ongoing aggressive pain control per primary

## 2024-09-20 NOTE — PROGRESS NOTES
[facility] - [facility]  Urology  Progress Note    Patient Name: Rhett Johnson  MRN: [id number]  Admission Date: 9/16/2024  Hospital Length of Stay: 4 days  Code Status: Full Code   Attending Provider: Hari Salguero MD   Primary Care Physician: Sue Campos MD    Subjective:     HPI:  Rhett Johnson is a 73 y.o. male  With [id number] urothelial cell carcinoma of the bladder.       The patient initially presented with prostate cancer and urinary incontinence s/p radical retropubic prostatectomy in 2001. In 07/2024 he presented to the ED with gross hematuria. Subsequent work up revealed asymmetric bladder wall thickening and urachal remnant. Underwent TURBT with bilateral retrograde pyelogram and left ureteral stent placement on 7/30/24. Path returned as HGMIBC.      Upper tract imaging (modality bilateral retrograde pyelogram, 07/30/24) - There was left hydroureteronephrosis down to the level of the bladder.  This was grade 3 hydronephrosis.  The distal ureter was evaluated with the semi rigid 6.8 [place] self-dilating Olympus ureteroscope.  There was no evidence of tumor involvement of the ureteral mucosa.      TURBT (07/30/24) - Urinary bladder, transurethral resection of bladder tumor (TURBT):   - High-grade papillary urothelial carcinoma   - Muscularis propria is present and is involved by carcinoma   - Lymphovascular and perineural invasion are identified   - See synoptic report below   - Mismatch repair protein testing is pending and results will be reported in a supplemental report      CT C/A/P-8/6/2024- no nodes or metastasis.  Bone scan-8/14/2024- no osseous metastasis.  Creatinine-1.9 (eGFR 37), albumin 2.7.     Due to renal function not felt to be a candidate for platinum based chemotherapy.    Interval History: No acute events overnight. The patient reports that he ambulated in the hallways many times yesterday and overnight. Continues to have bowel movements and has been passing a copious  "amount of flatus. His abdominal pain has since resolved.     Review of Systems  Objective:     Temp:  [98.2 °F (36.8 °C)-98.7 °F (37.1 °C)] 98.7 °F (37.1 °C)  Pulse:  [] 98  Resp:  [18-20] 18  SpO2:  [92 %-99 %] 93 %  BP: (151-204)/(69-90) 165/71     Body mass index is 24.39 kg/m².           Drains       Drain  Duration                  Closed/Suction Drain 09/16/24 1751 Left Abdomen Bulb 19 Fr. <1 day         Ureteral Drain/Stent 09/16/24 1652 Left ureter 7.2 Fr. <1 day         Ureteral Drain/Stent 09/16/24 1652 Right ureter 7.2 Fr. <1 day         Urostomy 09/16/24 1751 ileal conduit RUQ <1 day                     Physical Exam  Constitutional:       Appearance: Normal appearance.   HENT:      Head: Normocephalic and atraumatic.   Abdominal:      General: Abdomen is flat.      Palpations: Abdomen is soft.      Tenderness: There is no guarding or rebound.      Comments: Ostomy is healthy appearing covered in appliance.   Stents in place.  Incisions are clean dry and intact.   Skin:     General: Skin is warm.   Neurological:      General: No focal deficit present.      Mental Status: He is alert and oriented to person, place, and time.   Psychiatric:         Mood and Affect: Mood normal.           Significant Labs:    BMP:  Recent Labs   Lab 09/19/24  1927 09/19/24  2354 09/20/24  0224   * 137 136  136   K 4.0 3.6 3.4*  3.4*    106 105  105   CO2 24 23 23  23   BUN 18 16 14  14   CREATININE 1.3 1.2 1.2  1.2   CALCIUM 9.1 8.6* 8.5*  8.5*       CBC:   Recent Labs   Lab 09/18/24  1810 09/19/24  0211 09/20/24  0224   WBC 16.38* 12.05 10.37   HGB 10.9* 9.5* 8.6*   HCT 33.9* 29.3* 26.6*    195 204       Blood Culture: No results for input(s): "LABBLOO" in the last 168 hours.  Urine Culture: No results for input(s): "LABURIN" in the last 168 hours.  Urine Studies: No results for input(s): "COLORU", "APPEARANCEUA", "PHUR", "SPECGRAV", "PROTEINUA", "GLUCUA", "KETONESU", "BILIRUBINUA", " ""OCCULTUA", "NITRITE", "UROBILINOGEN", "LEUKOCYTESUR", "RBCUA", "WBCUA", "BACTERIA", "SQUAMEPITHEL", "HYALINECASTS" in the last 168 hours.    Invalid input(s): "GIOVANI"    Significant Imaging:  All pertinent imaging results/findings from the past 24 hours have been reviewed.                  Assessment/Plan:     Malignant neoplasm of overlapping sites of bladder  Monty Johnson is a 73 year old who is s/p open radical cystectomy and ileal conduit creation.     - Hemoglobin responded to 2 units of pRBC.  - Hemoglobin 8.6 from 9.5   - BID suppositories   - Walker ordered   - Diet: Regular Diet, low sodium. Impact AR shake with every meal.   - Multimodal pain control   - Bowel prep: Miralax, Suppositories, Senna-docusate   - PT/OT consulted, he was not seen yesterday due to pain and was receiving blood at the time   - Wound care consulted   - Ambulation in the halls x3   - Encourage incentive spirometer usage   - Delirium precautions    Dispo - Likely home today          VTE Risk Mitigation (From admission, onward)           Ordered     heparin (porcine) injection 5,000 Units  Every 8 hours         09/16/24 1818     IP VTE HIGH RISK PATIENT  Once         09/16/24 1818     Place sequential compression device  Until discontinued         09/16/24 1818                    Rashmi Gomez MD  Urology  [facility] - [facility]  "

## 2024-09-20 NOTE — PLAN OF CARE
[facility] - [facility]      HOME HEALTH ORDERS  FACE TO FACE ENCOUNTER    Patient Name: Rhett Johnson  YOB: 1950    PCP: Sue Campos MD   PCP Address: [address] [address]  PCP Phone Number: [phone]  PCP Fax: [phone]    Encounter Date: 8/22/24    Admit to Home Health    Diagnoses:  Active Hospital Problems    Diagnosis  POA    *Malignant neoplasm of overlapping sites of bladder [C67.8]  Yes    White coat syndrome with diagnosis of hypertension [I10]  Yes    Hypertensive urgency [I16.0]  No    PVCs (premature ventricular contractions) [I49.3]  Unknown     Previously symptomatic, now resolved on coreg      Chronic kidney disease, stage 3a [N18.31]  Yes     Documented on 12/15/2023      Hyperlipidemia [E78.5]  Yes    Essential hypertension [I10]  Yes      Resolved Hospital Problems   No resolved problems to display.       Follow Up Appointments:  Future Appointments   Date Time Provider Department Center   10/1/2024  1:00 PM Lacey Berman NP [facility] COLON [facility]   10/1/2024  1:30 PM Hari Salguero MD [facility] UROLOG Gwyn Villarreal       Allergies:  Review of patient's allergies indicates:   Allergen Reactions    Pcn [penicillins] Anaphylaxis    Valium [diazepam] Anaphylaxis       Medications: Review discharge medications with patient and family and provide education.    Current Facility-Administered Medications   Medication Dose Route Frequency Provider Last Rate Last Admin    0.9%  NaCl infusion (for blood administration)   Intravenous Q24H PRN Rashmi Gomez MD        acetaminophen tablet 1,000 mg  1,000 mg Oral Q8H Jeison Dorman MD   1,000 mg at 09/20/24 0556    atorvastatin tablet 80 mg  80 mg Oral Daily Elida Rojo MD   80 mg at 09/20/24 0957    bisacodyL suppository 10 mg  10 mg Rectal BID Rashmi Gomez MD        calcium gluconate 1 g in NS IVPB (premixed)  1 g Intravenous Once Rashmi Gomez MD 50 mL/hr at 09/20/24 1004 1 g at 09/20/24  1004    carvediloL tablet 12.5 mg  12.5 mg Oral Daily Elida Rojo MD   12.5 mg at 09/19/24 0748    carvediloL tablet 25 mg  25 mg Oral BID Jeison Dorman MD   25 mg at 09/19/24 2119    cyclobenzaprine tablet 10 mg  10 mg Oral Q8H Nael Roach MD   10 mg at 09/20/24 0556    dextrose 10% bolus 125 mL 125 mL  12.5 g Intravenous PRN Lianne Talley MD        dextrose 10% bolus 250 mL 250 mL  25 g Intravenous PRN Lianne Talley MD        diphenhydrAMINE injection 12.5 mg  12.5 mg Intravenous Q6H PRN Elida Rojo MD        famotidine (PF) injection 20 mg  20 mg Intravenous Daily Hari Salguero MD   20 mg at 09/20/24 0957    gabapentin capsule 300 mg  300 mg Oral Q8H Rodger Girlado DO   300 mg at 09/20/24 0556    heparin (porcine) injection 5,000 Units  5,000 Units Subcutaneous Q8H Elida Rojo MD   5,000 Units at 09/20/24 0556    hydrALAZINE tablet 50 mg  50 mg Oral Q8H PRN Jeison Dorman MD   50 mg at 09/19/24 2143    lactated ringers infusion   Intravenous Continuous Rashmi Gomez MD 75 mL/hr at 09/19/24 0747 Rate Change at 09/19/24 0747    ondansetron injection 4 mg  4 mg Intravenous Q8H PRN Elida Rojo MD   4 mg at 09/18/24 0942    oxyCODONE immediate release tablet 5 mg  5 mg Oral Q4H PRN Elida Rojo MD   5 mg at 09/18/24 1604    polyethylene glycol packet 17 g  17 g Oral Daily Elida Rojo MD   17 g at 09/18/24 0917    potassium, sodium phosphates 280-160-250 mg packet 1 packet  1 packet Oral Once Rashmi Gomez MD        prochlorperazine injection Soln 5 mg  5 mg Intravenous Q6H PRN Elida Rojo MD        senna-docusate 8.6-50 mg per tablet 1 tablet  1 tablet Oral Daily PRN Cathy Rome MD        sodium chloride 0.9% flush 10 mL  10 mL Intravenous Q6H Hari Salguero MD   10 mL at 09/20/24 0557    And    sodium chloride 0.9% flush 10 mL  10 mL Intravenous PRN Hari Salguero MD            Medication List         START taking these medications      enoxaparin 40 mg/0.4 mL Syrg  Commonly known as: LOVENOX  Inject 0.4 mLs (40 mg total) into the skin once daily.     ibuprofen 800 MG tablet  Commonly known as: ADVIL,MOTRIN  Take 1 tablet (800 mg total) by mouth every 6 (six) hours as needed for Pain.     oxyCODONE 5 MG immediate release tablet  Commonly known as: ROXICODONE  Take 1 tablet (5 mg total) by mouth every 4 (four) hours as needed for Pain.     polyethylene glycol 17 gram/dose powder  Commonly known as: GLYCOLAX  Take 17 g by mouth once daily.            CHANGE how you take these medications      * acetaminophen 80 MG Chew  Commonly known as: TYLENOL  Take by mouth.  What changed: Another medication with the same name was added. Make sure you understand how and when to take each.     * acetaminophen 500 MG tablet  Commonly known as: TYLENOL  Take 1 tablet (500 mg total) by mouth every 6 (six) hours as needed for Pain.  What changed: You were already taking a medication with the same name, and this prescription was added. Make sure you understand how and when to take each.           * This list has 2 medication(s) that are the same as other medications prescribed for you. Read the directions carefully, and ask your doctor or other care provider to review them with you.                CONTINUE taking these medications      albuterol 90 mcg/actuation inhaler  Commonly known as: PROVENTIL/VENTOLIN HFA  Inhale 2 puffs into the lungs every 6 (six) hours as needed for Wheezing. Rescue     ascorbic acid (vitamin C) 100 MG tablet  Commonly known as: VITAMIN C  Take 100 mg by mouth.     aspirin 81 MG EC tablet  Commonly known as: ECOTRIN  Take 81 mg by mouth once daily.     carvediloL 12.5 MG tablet  Commonly known as: COREG     cholecalciferol (vitamin D3) 10 mcg (400 unit) Cap capsule  Take 1 tablet by mouth.     cyclobenzaprine 10 MG tablet  Commonly known as: FLEXERIL  Take 10 mg by mouth 3 (three) times daily.      furosemide 40 MG tablet  Commonly known as: LASIX  daily as needed.     gabapentin 300 MG capsule  Commonly known as: NEURONTIN  Take 300 mg by mouth 3 (three) times daily.     multivitamin per tablet  Commonly known as: THERAGRAN  Take 1 tablet by mouth once daily.     rosuvastatin 40 MG Tab  Commonly known as: CRESTOR  Take 40 mg by mouth.     umeclidinium 62.5 mcg/actuation inhalation capsule  Commonly known as: INCRUSE ELLIPTA  Inhale 1 Inhaler into the lungs once daily. Controller     zinc gluconate 50 mg tablet  Take 50 mg by mouth. 2 tablets per day            STOP taking these medications      sulfamethoxazole-trimethoprim 800-160mg 800-160 mg Tab  Commonly known as: BACTRIM DS                I have seen and examined this patient within the last 30 days. My clinical findings that support the need for the home health skilled services and home bound status are the following:no   Weakness/numbness causing balance and gait disturbance due to Surgery making it taxing to leave home.     Diet:   regular diet    Labs:  None    Referrals/ Consults  Physical Therapy to evaluate and treat. Evaluate for home safety and equipment needs; Establish/upgrade home exercise program. Perform / instruct on therapeutic exercises, gait training, transfer training, and Range of Motion.  Occupational Therapy to evaluate and treat. Evaluate home environment for safety and equipment needs. Perform/Instruct on transfers, ADL training, ROM, and therapeutic exercises.    Activities:   activity as tolerated and no strenuous exercise for 6 weeks    Nursing:   Agency to admit patient within 24 hours of hospital discharge unless specified on physician order or at patient request    SN to complete comprehensive assessment including routine vital signs. Instruct on disease process and s/s of complications to report to MD. Review/verify medication list sent home with the patient at time of discharge  and instruct patient/caregiver as needed.  Frequency may be adjusted depending on start of care date.     Skilled nurse to perform up to 3 visits PRN for symptoms related to diagnosis    Notify MD if SBP > 160 or < 90; DBP > 90 or < 50; HR > 120 or < 50; Temp > 101; O2 < 88%;     Ok to schedule additional visits based on staff availability and patient request on consecutive days within the home health episode.    When multiple disciplines ordered:    Start of Care occurs on Sunday - Wednesday schedule remaining discipline evaluations as ordered on separate consecutive days following the start of care.    Thursday SOC -schedule subsequent evaluations Friday and Monday the following week.     Friday - Saturday SOC - schedule subsequent discipline evaluations on consecutive days starting Monday of the following week.    For all post-discharge communication and subsequent orders please contact patient's primary care physician. If unable to reach primary care physician or do not receive response within 30 minutes, please contact [facility] for clinical staff order clarification    Miscellaneous   Urostomy Care:  Instruct patient/caregiver to empty bag when full and PRN. and all additional indicated care    Home Health Aide:  Nursing Three times weekly    Wound Care Orders  NA    I certify that this patient is confined to his home and needs intermittent skilled nursing care, physical therapy, and speech therapy.

## 2024-09-21 NOTE — PLAN OF CARE
[facility]  Discharge Final Note    Primary Care Provider: Sue Campos MD  Expected Discharge Date: 9/20/2024    Patient medically ready for discharge to home with [facility] home health.     Transportation by family.     Is family/patient aware of discharge: yes     Hospital follow up scheduled: yes       Final Discharge Note (most recent)       Final Note - 09/21/24 0936          Final Note    Assessment Type Final Discharge Note     Anticipated Discharge Disposition Home-Health Care [facility] [place] [place]     Hospital Resources/Appts/Education Provided Provided patient/caregiver with written discharge plan information                     Important Message from Medicare         Referral Info (most recent)       Referral Info    No documentation.                 Contact Info       Hari Salguero MD   Specialty: Urology    [address]  [address]th [address]  [place] [address]   Phone: [phone]       Next Steps: Follow up on 10/1/2024          Future Appointments   Date Time Provider Department Center   10/1/2024  1:00 PM Lacey Berman NP [facility] COLON [facility]   10/1/2024  1:30 PM Hari Salguero MD [facility] UROLOG Gwyn Yan RN  Case Management  Ext: [phone]  09/21/2024

## 2024-09-22 PROCEDURE — G0180 MD CERTIFICATION HHA PATIENT: HCPCS | Mod: ,,, | Performed by: UROLOGY

## 2024-09-24 ENCOUNTER — PATIENT MESSAGE (OUTPATIENT)
Dept: FAMILY MEDICINE | Facility: CLINIC | Age: 74
End: 2024-09-24
Payer: MEDICARE

## 2024-09-24 ENCOUNTER — TELEPHONE (OUTPATIENT)
Dept: UROLOGY | Facility: CLINIC | Age: 74
End: 2024-09-24
Payer: MEDICARE

## 2024-09-24 NOTE — TELEPHONE ENCOUNTER
Spoke with patient who was able to provide acceptable patient identifiers prior to start of conversation.   Call received from home health nurse re: concerns about incision.  Patient with localized redness, not new onset.  Patient with some serous drainage from wound edges.  Discussed with Dr Salguero.  Msg sent to LIBORIO Berman re: f/u appt.   nurse following.

## 2024-09-24 NOTE — PHYSICIAN QUERY
Please clarify the nature / etiology of the patient's hematological values:  Acute blood loss anemia

## 2024-09-25 ENCOUNTER — TELEPHONE (OUTPATIENT)
Dept: UROLOGY | Facility: CLINIC | Age: 74
End: 2024-09-25
Payer: MEDICARE

## 2024-09-25 NOTE — TELEPHONE ENCOUNTER
Spoke with patient's wife who was able to provide acceptable patient identifiers prior to start of conversation.   Discussed appt with stoma nurse.  Msg sent to provider office.

## 2024-09-26 ENCOUNTER — OFFICE VISIT (OUTPATIENT)
Dept: UROLOGY | Facility: CLINIC | Age: 74
End: 2024-09-26
Payer: MEDICARE

## 2024-09-26 ENCOUNTER — OFFICE VISIT (OUTPATIENT)
Dept: SURGERY | Facility: CLINIC | Age: 74
End: 2024-09-26
Payer: MEDICARE

## 2024-09-26 VITALS
SYSTOLIC BLOOD PRESSURE: 153 MMHG | HEIGHT: 70 IN | HEIGHT: 70 IN | SYSTOLIC BLOOD PRESSURE: 153 MMHG | BODY MASS INDEX: 23.7 KG/M2 | BODY MASS INDEX: 23.71 KG/M2 | WEIGHT: 165.56 LBS | HEART RATE: 89 BPM | HEART RATE: 89 BPM | WEIGHT: 165.63 LBS | DIASTOLIC BLOOD PRESSURE: 77 MMHG | DIASTOLIC BLOOD PRESSURE: 77 MMHG | RESPIRATION RATE: 18 BRPM

## 2024-09-26 DIAGNOSIS — Z43.6 ATTENTION TO UROSTOMY: Primary | ICD-10-CM

## 2024-09-26 DIAGNOSIS — C67.8 MALIGNANT NEOPLASM OF OVERLAPPING SITES OF BLADDER: Primary | ICD-10-CM

## 2024-09-26 LAB
COMMENT: NORMAL
FINAL PATHOLOGIC DIAGNOSIS: NORMAL
FROZEN SECTION DIAGNOSIS: NORMAL
FROZEN SECTION FOOTNOTE: NORMAL
GROSS: NORMAL
Lab: NORMAL

## 2024-09-26 PROCEDURE — 3288F FALL RISK ASSESSMENT DOCD: CPT | Mod: CPTII,S$GLB,, | Performed by: NURSE PRACTITIONER

## 2024-09-26 PROCEDURE — 99999 PR PBB SHADOW E&M-EST. PATIENT-LVL III: CPT | Mod: PBBFAC,,, | Performed by: UROLOGY

## 2024-09-26 PROCEDURE — 99499 UNLISTED E&M SERVICE: CPT | Mod: S$GLB,,, | Performed by: UROLOGY

## 2024-09-26 PROCEDURE — 1125F AMNT PAIN NOTED PAIN PRSNT: CPT | Mod: CPTII,S$GLB,, | Performed by: NURSE PRACTITIONER

## 2024-09-26 PROCEDURE — 99999 PR PBB SHADOW E&M-EST. PATIENT-LVL IV: CPT | Mod: PBBFAC,,, | Performed by: NURSE PRACTITIONER

## 2024-09-26 PROCEDURE — 3008F BODY MASS INDEX DOCD: CPT | Mod: CPTII,S$GLB,, | Performed by: NURSE PRACTITIONER

## 2024-09-26 PROCEDURE — 3078F DIAST BP <80 MM HG: CPT | Mod: CPTII,S$GLB,, | Performed by: NURSE PRACTITIONER

## 2024-09-26 PROCEDURE — 1157F ADVNC CARE PLAN IN RCRD: CPT | Mod: CPTII,S$GLB,, | Performed by: NURSE PRACTITIONER

## 2024-09-26 PROCEDURE — 1160F RVW MEDS BY RX/DR IN RCRD: CPT | Mod: CPTII,S$GLB,, | Performed by: NURSE PRACTITIONER

## 2024-09-26 PROCEDURE — 3077F SYST BP >= 140 MM HG: CPT | Mod: CPTII,S$GLB,, | Performed by: NURSE PRACTITIONER

## 2024-09-26 PROCEDURE — 1111F DSCHRG MED/CURRENT MED MERGE: CPT | Mod: CPTII,S$GLB,, | Performed by: NURSE PRACTITIONER

## 2024-09-26 PROCEDURE — 99203 OFFICE O/P NEW LOW 30 MIN: CPT | Mod: S$GLB,,, | Performed by: NURSE PRACTITIONER

## 2024-09-26 PROCEDURE — 1159F MED LIST DOCD IN RCRD: CPT | Mod: CPTII,S$GLB,, | Performed by: NURSE PRACTITIONER

## 2024-09-26 PROCEDURE — 1101F PT FALLS ASSESS-DOCD LE1/YR: CPT | Mod: CPTII,S$GLB,, | Performed by: NURSE PRACTITIONER

## 2024-09-26 NOTE — PROGRESS NOTES
CRS Office Visit History and Physical    Referring Md:   No referring provider defined for this encounter.    SUBJECTIVE:     Chief Complaint: urostomy    History of Present Illness:  The patient is new patient to this practice.   Course is as follows:  Patient is a 73 y.o. male presents with urostomy.  Sx by Dr. Salguero 9/16/24.   Has had some troubles with new urostomy.  Occ leaks.  Wearing 1 pc flat coloplast bag.  Still with HH     Also reports increased pain/redness/drainage from midline incision     Review of patient's allergies indicates:   Allergen Reactions    Pcn [penicillins] Anaphylaxis    Valium [diazepam] Anaphylaxis       Past Medical History:   Diagnosis Date    Anemia 09/09/2024    Asthma     Cancer     Carotid occlusion, left     100% blockage    COPD (chronic obstructive pulmonary disease)     Current tobacco use 08/20/2018    Deep vein thrombosis     Disorder of kidney and ureter     DVT (deep venous thrombosis)     Elevated WBC count 09/10/2024    Gross hematuria 07/30/2024    Hx of hepatitis     Hyperlipidemia     Hypertension     on medication monitoring    Hypertensive heart and chronic kidney disease with heart failure and stage 1 through stage 4 chronic kidney disease, or unspecified chronic kidney disease 08/29/2024    Documented on 01/03/2023      Peripheral artery disease     Prostate cancer 2001    Dr. Bai     PVCs (premature ventricular contractions) 09/19/2024    Previously symptomatic, now resolved on coreg       Past Surgical History:   Procedure Laterality Date    ADENOIDECTOMY      APPENDECTOMY      Arthroscopic left knee Left     BARBOTAGE OF BLADDER N/A 7/30/2024    Procedure: BARBOTAGE, BLADDER;  Surgeon: Fabian Bai MD;  Location: [facility] OR;  Service: Urology;  Laterality: N/A;    BARBOTAGE OF URETER Bilateral 7/30/2024    Procedure: BARBOTAGE, URETER;  Surgeon: Fabian Bai MD;  Location: [facility] OR;  Service: Urology;  Laterality: Bilateral;    CARDIAC  CATHETERIZATION      no stents    COLONOSCOPY      CREATION, ILEAL CONDUIT N/A 9/16/2024    Procedure: CREATION, ILEAL CONDUIT;  Surgeon: Hari Salguero MD;  Location: [facility] OR 2ND FLR;  Service: Urology;  Laterality: N/A;    CYSTOSCOPY W/ RETROGRADES Bilateral 7/30/2024    Procedure: CYSTOSCOPY, WITH RETROGRADE PYELOGRAM;  Surgeon: Fabian Bai MD;  Location: [facility] OR;  Service: Urology;  Laterality: Bilateral;    CYSTOSCOPY W/ URETERAL STENT PLACEMENT Left 7/30/2024    Procedure: CYSTOSCOPY, WITH URETERAL STENT INSERTION;  Surgeon: Fabian Bai MD;  Location: [facility] OR;  Service: Urology;  Laterality: Left;    DILATION OF URETER Left 7/30/2024    Procedure: DILATION, URETER;  Surgeon: Fabian Bai MD;  Location: [facility] OR;  Service: Urology;  Laterality: Left;    DILATION OF URETHRA N/A 7/30/2024    Procedure: DILATION, URETHRA;  Surgeon: Fabian Bai MD;  Location: [facility] OR;  Service: Urology;  Laterality: N/A;    INSERTION OF MULTIFOCAL INTRAOCULAR LENS Left 12/12/2018    Procedure: INSERTION, IOL, MULTIFOCAL;  Surgeon: Eleanor Seaman MD;  Location: [facility] OR;  Service: Ophthalmology;  Laterality: Left;    INSERTION OF MULTIFOCAL INTRAOCULAR LENS Right 4/3/2019    Procedure: INSERTION, IOL, MULTIFOCAL;  Surgeon: Eleanor Seaman MD;  Location: [facility] OR;  Service: Ophthalmology;  Laterality: Right;    NECK SURGERY  2002    PHACOEMULSIFICATION OF CATARACT Left 12/12/2018    Procedure: PHACOEMULSIFICATION, CATARACT;  Surgeon: Eleanor Seaman MD;  Location: [facility] OR;  Service: Ophthalmology;  Laterality: Left;    PHACOEMULSIFICATION OF CATARACT Right 4/3/2019    Procedure: PHACOEMULSIFICATION, CATARACT;  Surgeon: Eleanor Seaman MD;  Location: [facility] OR;  Service: Ophthalmology;  Laterality: Right;    PROSTATE SURGERY  2001    Radical prostectemy     RADICAL CYSTECTOMY N/A 9/16/2024    Procedure: CYSTECTOMY, RADICAL;  Surgeon: Hari Salguero MD;  Location: [facility] OR 2ND FLR;  Service: Urology;  Laterality:  "N/A;    SPINE SURGERY  2002    Fusion of c4,c5,c6    TONSILLECTOMY      TURBT (TRANSURETHRAL RESECTION OF BLADDER TUMOR) N/A 7/30/2024    Procedure: TURBT (TRANSURETHRAL RESECTION OF BLADDER TUMOR);  Surgeon: Fabian Bai MD;  Location: [facility] OR;  Service: Urology;  Laterality: N/A;    URETEROSCOPY Bilateral 7/30/2024    Procedure: URETEROSCOPY;  Surgeon: Fabian Bai MD;  Location: [facility] OR;  Service: Urology;  Laterality: Bilateral;     Family History   Problem Relation Name Age of Onset    Alzheimer's disease Mother      Diabetes Father      Diabetes Sister      Cervical cancer Sister      Cancer Sister      Diabetes Brother      Hypertension Brother      Heart disease Brother          CABG    Obesity Brother      COPD Brother      Scoliosis Daughter Sue     No Known Problems Son Fish     No Known Problems Daughter Stephanie      Social History     Tobacco Use    Smoking status: Former     Current packs/day: 1.00     Average packs/day: 1 pack/day for 51.0 years (51.0 total pack years)     Types: Cigarettes    Smokeless tobacco: Never    Tobacco comments:     Smoked about 50-55 years about 1 pack per day.  Quit October 20, 2020.     Patient aware of smoking cessation program and aware of health risk due to smoking.    Substance Use Topics    Alcohol use: Not Currently    Drug use: Never        Review of Systems:  Review of Systems   Genitourinary:         Urostomy       OBJECTIVE:     Vital Signs (Most Recent)  Blood Pressure (Abnormal) 153/77 (BP Location: Right arm, Patient Position: Sitting, BP Method: Large (Automatic))   Pulse 89   Respiration 18   Height 5' 10" (1.778 m)   Weight 75.1 kg (165 lb 9.8 oz)   Body Mass Index 23.76 kg/m²     Physical Exam:  General: White male in no distress   Neuro: Alert and oriented to person, place, and time.  Moves all extremities.     HEENT: No icterus.  Trachea midline  Respiratory: Respirations are even and unlabored, no cough or audible wheezing  Abdomen: "       9/26/24    Labs reviewed today:  Lab Results   Component Value Date    WBC 10.37 09/20/2024    HGB 8.6 (L) 09/20/2024    HCT 26.6 (L) 09/20/2024     09/20/2024    CHOL 201 (H) 02/16/2017    TRIG 54 02/16/2017    HDL 82 (H) 02/16/2017    ALT 17 08/22/2024    AST 19 08/22/2024     09/20/2024     09/20/2024    K 3.4 (L) 09/20/2024    K 3.4 (L) 09/20/2024     09/20/2024     09/20/2024    CREATININE 1.2 09/20/2024    CREATININE 1.2 09/20/2024    BUN 14 09/20/2024    BUN 14 09/20/2024    CO2 23 09/20/2024    CO2 23 09/20/2024    TSH 1.282 06/12/2024    INR 1.0 05/22/2024       Imaging reviewed today:  8/6/24 ct chest abdomen pelvis  1. Persistent posterior left lateral bladder wall thickening possibly slightly more prominent but likely related to bladder nondistention.  Left ureteral stent now present.  There is a small amount of air within the anterior bladder with tear felt to extend outside the bladder within the previously noted urachal remanent.  Air felt to be related to recent instrumentation.  Correlate clinically.  2. Extensive emphysematous findings with bilateral scarring.  Multiple small lingular pulmonary nodules present which may be stable and comparison with outside old chest CTs recommended.  Small irregular nodules within the inferior right upper lobe may represent nodular scarring.  Again comparison with older studies likely beneficial.  Attention on follow-up exams recommended to exclude malignancy/metastatic disease.  Partially calcified irregular opacity within the medial left upper lobe may be sequela of prior infectious or inflammatory etiology.  3. Small layering right pleural effusion.  4. Circumferential thickening of distal esophagus which may relate to esophagitis.  Correlate with EGD.  5. Diffuse atherosclerotic plaquing including prominent LAD coronary calcification.        ASSESSMENT/PLAN:     Diagnoses and all orders for this visit:    Attention to  urostomy    Pt later  seen by Dr. Salguero who removed stents with large mucus removed with it.         Patient instructed to do the following routine for pouching:  Cleanse skin with water, dry well.  Apply skin barrier film. Allow to dry  Apply soft/light convex. Placed in [employer] 2 pc today with tape border given proximity to naval.  Apply pouch sized appropriately for stoma.  Pt counseled on DME suppliers for  ostomy pouches. Rx to [facility] requesting OH[facility] and [facility] for once done with HH.  Pt also counseled on skin care, nutrition, hydration and ADL.  30 minute visit in face to face counseling.  Return clinic visit recommended in 2 weeks.        TIANA Hemphill-JET  Colon and Rectal Surgery

## 2024-09-26 NOTE — PROGRESS NOTES
Clinic Note  9/26/2024      Subjective:         Chief Complaint:   HPI  Rhett Johnson is a 73 y.o. 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 History of prostate cancer , s/p radical retropubic prostatectomy in 2001. PSA <0.01 in 2023. Here with his wife Rhina.  He is retired from [employer].  Consult from Dr. Melchor.  Co-morbidities- CKD 3b, COPD, PAD (7 stents), malnutrition, DVT. 7 year history of syncopal episodes with falls.  Due to renal function not felt to be a candidate for platinum based chemotherapy.  TURBT (transurethral resection of bladder tumor)-7/30/2024- HGMIUC (high grade muscle invasive urothelial carcinoma), left JJ stent placed. Complete resection.  CT C/A/P-8/6/2024- no nodes or metastasis.  Bone scan-8/14/2024- no osseous metastasis.Creatinine-1.9 (eGFR 37), albumin 2.7.     9/26/2024-cystectomy 9/16. Path [id number].    Past Medical History:   Diagnosis Date    Anemia 09/09/2024    Asthma     Cancer     Carotid occlusion, left     100% blockage    COPD (chronic obstructive pulmonary disease)     Current tobacco use 08/20/2018    Deep vein thrombosis     Disorder of kidney and ureter     DVT (deep venous thrombosis)     Elevated WBC count 09/10/2024    Gross hematuria 07/30/2024    Hx of hepatitis     Hyperlipidemia     Hypertension     on medication monitoring    Hypertensive heart and chronic kidney disease with heart failure and stage 1 through stage 4 chronic kidney disease, or unspecified chronic kidney disease 08/29/2024    Documented on 01/03/2023      Peripheral artery disease     Prostate cancer 2001    Dr. Bai     PVCs (premature ventricular contractions) 09/19/2024    Previously symptomatic, now resolved on coreg       Family History   Problem Relation Name Age of Onset    Alzheimer's disease Mother      Diabetes Father      Diabetes Sister      Cervical cancer Sister      Cancer Sister      Diabetes Brother      Hypertension  Brother      Heart disease Brother          CABG    Obesity Brother      COPD Brother      Scoliosis Daughter Sue     No Known Problems Son Fish     No Known Problems Daughter Stephanie      Social History     Socioeconomic History    Marital status:      Spouse name: Tammie   Tobacco Use    Smoking status: Former     Current packs/day: 1.00     Average packs/day: 1 pack/day for 51.0 years (51.0 ttl pk-yrs)     Types: Cigarettes    Smokeless tobacco: Never    Tobacco comments:     Smoked about 50-55 years about 1 pack per day.  Quit October 20, 2020.     Patient aware of smoking cessation program and aware of health risk due to smoking.    Substance and Sexual Activity    Alcohol use: Not Currently    Drug use: Never    Sexual activity: Not Currently     Partners: Female   Social History Narrative    Lives in [place] with his wife. He is retired from [employer]. 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      Social Determinants of Health     Financial Resource Strain: Low Risk  (9/17/2024)    Overall Financial Resource Strain (CARDIA)     Difficulty of Paying Living Expenses: Not hard at all   Food Insecurity: No Food Insecurity (9/17/2024)    Hunger Vital Sign     Worried About Running Out of Food in the Last Year: Never true     Ran Out of Food in the Last Year: Never true   Transportation Needs: No Transportation Needs (9/17/2024)    TRANSPORTATION NEEDS     Transportation : No   Physical Activity: Inactive (9/17/2024)    Exercise Vital Sign     Days of Exercise per Week: 0 days     Minutes of Exercise per Session: 0 min   Stress: No Stress Concern Present (9/17/2024)    [employer] - Occupational Stress Questionnaire     Feeling of Stress : Not at all   Housing Stability: Low Risk  (9/17/2024)    Housing Stability Vital Sign     Unable to Pay for Housing in the Last Year: No     Homeless in the Last Year: No      Past Surgical History:   Procedure Laterality Date    ADENOIDECTOMY      APPENDECTOMY      Arthroscopic left knee Left     BARBOTAGE OF BLADDER N/A 7/30/2024    Procedure: BARBOTAGE, BLADDER;  Surgeon: Fabian Bai MD;  Location: [facility];  Service: Urology;  Laterality: N/A;    BARBOTAGE OF URETER Bilateral 7/30/2024    Procedure: BARBOTAGE, URETER;  Surgeon: Fabian Bai MD;  Location: [facility];  Service: Urology;  Laterality: Bilateral;    CARDIAC CATHETERIZATION      no stents    COLONOSCOPY      CREATION, ILEAL CONDUIT N/A 9/16/2024    Procedure: CREATION, ILEAL CONDUIT;  Surgeon: Hari Salguero MD;  Location: [address];  Service: Urology;  Laterality: N/A;    CYSTOSCOPY W/ RETROGRADES Bilateral 7/30/2024    Procedure: CYSTOSCOPY, WITH RETROGRADE PYELOGRAM;  Surgeon: Fabian Bai MD;  Location: [facility];  Service: Urology;  Laterality: Bilateral;    CYSTOSCOPY W/ URETERAL STENT PLACEMENT Left 7/30/2024    Procedure: CYSTOSCOPY, WITH URETERAL STENT INSERTION;  Surgeon: Fabian Bai MD;  Location: [facility];  Service: Urology;  Laterality: Left;    DILATION OF URETER Left 7/30/2024    Procedure: DILATION, URETER;  Surgeon: Fabian Bai MD;  Location: [facility];  Service: Urology;  Laterality: Left;    DILATION OF URETHRA N/A 7/30/2024    Procedure: DILATION, URETHRA;  Surgeon: Fabian Bai MD;  Location: [facility];  Service: Urology;  Laterality: N/A;    INSERTION OF MULTIFOCAL INTRAOCULAR LENS Left 12/12/2018    Procedure: INSERTION, IOL, MULTIFOCAL;  Surgeon: Eleanor Seaman MD;  Location: [facility];  Service: Ophthalmology;  Laterality: Left;    INSERTION OF MULTIFOCAL INTRAOCULAR LENS Right 4/3/2019    Procedure: INSERTION, IOL, MULTIFOCAL;  Surgeon: Eleanor Seaman MD;  Location: [facility];  Service: Ophthalmology;  Laterality: Right;    NECK SURGERY  2002    PHACOEMULSIFICATION OF CATARACT Left 12/12/2018    Procedure: PHACOEMULSIFICATION, CATARACT;  Surgeon: Eleanor Seaman MD;   Location: [facility] OR;  Service: Ophthalmology;  Laterality: Left;    PHACOEMULSIFICATION OF CATARACT Right 4/3/2019    Procedure: PHACOEMULSIFICATION, CATARACT;  Surgeon: Eleanor Seaman MD;  Location: [facility];  Service: Ophthalmology;  Laterality: Right;    PROSTATE SURGERY  2001    Radical prostectemy     RADICAL CYSTECTOMY N/A 9/16/2024    Procedure: CYSTECTOMY, RADICAL;  Surgeon: Hari Salguero MD;  Location: [address];  Service: Urology;  Laterality: N/A;    SPINE SURGERY  2002    Fusion of c4,c5,c6    TONSILLECTOMY      TURBT (TRANSURETHRAL RESECTION OF BLADDER TUMOR) N/A 7/30/2024    Procedure: TURBT (TRANSURETHRAL RESECTION OF BLADDER TUMOR);  Surgeon: Fabian Bai MD;  Location: [facility] OR;  Service: Urology;  Laterality: N/A;    URETEROSCOPY Bilateral 7/30/2024    Procedure: URETEROSCOPY;  Surgeon: Fabian Bai MD;  Location: [facility];  Service: Urology;  Laterality: Bilateral;     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    Daily urinary incontinence    ETOH abuse    Fusion of spine, site unspecified    Gastroesophageal reflux disease    History of carotid artery disease    Intermittent claudication of both lower extremities due to atherosclerosis    Malignant neoplasm of prostate    Occlusion of left internal carotid artery    Pulmonary nodule, left    Right bundle branch block    Spinal stenosis, lumbar region, with neurogenic claudication    Atherosclerosis of both carotid arteries    Chronic kidney disease, stage 3a    History of asbestos exposure    Neuropathy     "Postlaminectomy syndrome of lumbar region    Vitamin D deficiency    Peripheral artery disease    Anemia    Hypoalbuminemia    H/O echocardiogram    H/O cardiovascular stress test    White coat syndrome with diagnosis of hypertension    Hypertensive urgency    PVCs (premature ventricular contractions)     Review of Systems      Objective:      There were no vitals taken for this visit.  Estimated body mass index is 24.39 kg/m² as calculated from the following:    Height as of 9/16/24: 5' 10" (1.778 m).    Weight as of 9/17/24: 77.1 kg (170 lb).  Physical Exam      Assessment and Plan:   Reviewed path. Catheters removed. Incision healing.         Problem List Items Addressed This Visit       Malignant neoplasm of overlapping sites of bladder - Primary       Follow up:   1 month with KEATON.    Hari Salguero        "

## 2024-10-09 ENCOUNTER — OFFICE VISIT (OUTPATIENT)
Dept: WOUND CARE | Facility: CLINIC | Age: 74
End: 2024-10-09
Payer: MEDICARE

## 2024-10-09 DIAGNOSIS — Z43.6 ATTENTION TO UROSTOMY: Primary | ICD-10-CM

## 2024-10-09 DIAGNOSIS — Z71.89 ENCOUNTER FOR OSTOMY CARE EDUCATION: ICD-10-CM

## 2024-10-09 PROCEDURE — 1111F DSCHRG MED/CURRENT MED MERGE: CPT | Mod: CPTII,S$GLB,, | Performed by: CLINICAL NURSE SPECIALIST

## 2024-10-09 PROCEDURE — 1160F RVW MEDS BY RX/DR IN RCRD: CPT | Mod: CPTII,S$GLB,, | Performed by: CLINICAL NURSE SPECIALIST

## 2024-10-09 PROCEDURE — 99215 OFFICE O/P EST HI 40 MIN: CPT | Mod: S$GLB,,, | Performed by: CLINICAL NURSE SPECIALIST

## 2024-10-09 PROCEDURE — 1157F ADVNC CARE PLAN IN RCRD: CPT | Mod: CPTII,S$GLB,, | Performed by: CLINICAL NURSE SPECIALIST

## 2024-10-09 PROCEDURE — 1159F MED LIST DOCD IN RCRD: CPT | Mod: CPTII,S$GLB,, | Performed by: CLINICAL NURSE SPECIALIST

## 2024-10-09 PROCEDURE — 99999 PR PBB SHADOW E&M-EST. PATIENT-LVL III: CPT | Mod: PBBFAC,,, | Performed by: CLINICAL NURSE SPECIALIST

## 2024-10-09 NOTE — PROGRESS NOTES
Subjective     Patient ID: Rhett Johnson is a 73 y.o. male.    Chief Complaint: Urostomy    This is second post op but my first seeing him after urostomy done 9/16 by Dr tubbs .   He is doing well, wife with him , still has HHN coming but does nothing for him related to ostomy, wife concerned with pouch leaks and wound nearby       Review of Systems   Constitutional:  Positive for activity change. Negative for fever.   Gastrointestinal:         A bit more bowel urgency then he is used to    Genitourinary:  Negative for hematuria.        Excess mucous noted in urostomy pouch     Integumentary:  Positive for rash and wound.          Objective     Physical Exam  Constitutional:       Appearance: Normal appearance.   Pulmonary:      Effort: Pulmonary effort is normal. No respiratory distress.   Abdominal:      General: Abdomen is flat.      Palpations: Abdomen is soft.   Skin:     Findings: Rash present.   Neurological:      Mental Status: He is alert.       Stoma is budded 22mm today   rash noted and recs as follows  Rub in small amt of antifungal powder and seal with CALAMINE Lotion   LET dry well and apply pouch ,   Can fill umbilicus with piece of stoma ring and then it will stick better  Try using belt   WOUND   Clean and apply HYDROFERA BLUE READY and secure with paper tape  Change every 2 days and prn    Discussed mucous and sampling for urine cultures at length with pt and wife   Handout given          Assessment and Plan     1. Attention to urostomy    2. Encounter for ostomy care education        Send RX to [facility]   They will stop HHN this week  FU as needed and send progress of wound via portal   I spent a total of 40 minutes on the day of the visit.  This includes face to face time and non-face to face time preparing to see the patient (eg, review of tests), obtaining and/or reviewing separately obtained history, documenting clinical information in the electronic or other health record, independently  interpreting results and communicating results to the patient/family/caregiver, or care coordinator.           No follow-ups on file.

## 2024-10-31 ENCOUNTER — OFFICE VISIT (OUTPATIENT)
Dept: UROLOGY | Facility: CLINIC | Age: 74
End: 2024-10-31
Payer: MEDICARE

## 2024-10-31 VITALS
WEIGHT: 158.06 LBS | SYSTOLIC BLOOD PRESSURE: 181 MMHG | HEART RATE: 95 BPM | HEIGHT: 70 IN | BODY MASS INDEX: 22.63 KG/M2 | DIASTOLIC BLOOD PRESSURE: 90 MMHG

## 2024-10-31 DIAGNOSIS — C61 MALIGNANT NEOPLASM OF PROSTATE: Primary | ICD-10-CM

## 2024-10-31 PROCEDURE — 99499 UNLISTED E&M SERVICE: CPT | Mod: S$GLB,,, | Performed by: UROLOGY

## 2024-10-31 PROCEDURE — 99999 PR PBB SHADOW E&M-EST. PATIENT-LVL III: CPT | Mod: PBBFAC,,, | Performed by: UROLOGY

## 2024-11-11 ENCOUNTER — EXTERNAL HOME HEALTH (OUTPATIENT)
Dept: HOME HEALTH SERVICES | Facility: HOSPITAL | Age: 74
End: 2024-11-11
Payer: MEDICARE

## 2025-01-13 ENCOUNTER — TELEPHONE (OUTPATIENT)
Dept: UROLOGY | Facility: CLINIC | Age: 75
End: 2025-01-13
Payer: MEDICARE

## 2025-01-13 NOTE — TELEPHONE ENCOUNTER
----- Message from Renee sent at 1/13/2025 11:32 AM CST -----  Type:  Sooner Appointment Request    Caller is requesting a sooner appointment.  Caller declined first available appointment listed below.  Caller will not accept being placed on the waitlist and is requesting a message be sent to doctor.  Name of Caller: Pt's wife   When is the first available appointment? 04/30  Symptoms: 3 month F/U   Would the patient rather a call back or a response via [facility]? Call back   Best Call Back Number: [phone]

## 2025-01-30 ENCOUNTER — PATIENT MESSAGE (OUTPATIENT)
Dept: UROLOGY | Facility: CLINIC | Age: 75
End: 2025-01-30
Payer: MEDICARE

## 2025-01-30 ENCOUNTER — TELEPHONE (OUTPATIENT)
Dept: UROLOGY | Facility: CLINIC | Age: 75
End: 2025-01-30
Payer: MEDICARE

## 2025-02-07 ENCOUNTER — OFFICE VISIT (OUTPATIENT)
Dept: WOUND CARE | Facility: CLINIC | Age: 75
End: 2025-02-07
Payer: MEDICARE

## 2025-02-07 DIAGNOSIS — T81.89XD NON-HEALING SURGICAL WOUND, SUBSEQUENT ENCOUNTER: ICD-10-CM

## 2025-02-07 DIAGNOSIS — Z43.6 ATTENTION TO UROSTOMY: Primary | ICD-10-CM

## 2025-02-07 PROCEDURE — 1160F RVW MEDS BY RX/DR IN RCRD: CPT | Mod: CPTII,S$GLB,, | Performed by: CLINICAL NURSE SPECIALIST

## 2025-02-07 PROCEDURE — 99215 OFFICE O/P EST HI 40 MIN: CPT | Mod: S$GLB,,, | Performed by: CLINICAL NURSE SPECIALIST

## 2025-02-07 PROCEDURE — 99999 PR PBB SHADOW E&M-EST. PATIENT-LVL III: CPT | Mod: PBBFAC,,, | Performed by: CLINICAL NURSE SPECIALIST

## 2025-02-07 PROCEDURE — 1159F MED LIST DOCD IN RCRD: CPT | Mod: CPTII,S$GLB,, | Performed by: CLINICAL NURSE SPECIALIST

## 2025-02-07 PROCEDURE — 1157F ADVNC CARE PLAN IN RCRD: CPT | Mod: CPTII,S$GLB,, | Performed by: CLINICAL NURSE SPECIALIST

## 2025-02-07 NOTE — PROGRESS NOTES
"Subjective     Patient ID: hRett Johnson is a 74 y.o. male.    Chief Complaint: Wound Check and Urostomy    This is visit for wound check , this is pt is post urostomy done 9/16/24 by Dr tubbs .   He had a part of wound that did not heal apparently post op . Apparently it is still not healed, has proud flesh and he needs back surgery so has to get wound healed before ortho will operate.       Review of Systems   Constitutional:  Positive for activity change. Negative for fever.        Has back issues and pain when walking.    Gastrointestinal:         A bit more bowel urgency then he is used to    Genitourinary:  Negative for hematuria.        Excess mucous noted in urostomy pouch     Integumentary:  Positive for wound. Negative for rash.          Objective     Physical Exam  Constitutional:       Appearance: Normal appearance.   Pulmonary:      Effort: Pulmonary effort is normal. No respiratory distress.   Abdominal:      General: Abdomen is flat.      Palpations: Abdomen is soft.   Skin:     Findings: No rash.   Neurological:      Mental Status: He is alert.          The bottom of wound has proud flesh which I used silver nitrate to cauterize,    PROCEDURE:  CHEMICAL CAUTERY: Performed for hypergranulation tissue/granuloma(s) of abdominal wound . The tissue was anesthetized topically with application of Lidocaine gel 2% and 5 minute wait time. The area noted was cauterized with AGNO3. The patient stated pain was 0 on 1-10 scale with this procedure.    Instructed to pack daily with IODOSORB and small bit of guaze or strip packing and clean with Vashe.   Wife shown how   Ostomy  I also changed his urostomy pouch to a one piece Patrick convex  7/8" and see if this adheres better as he expressed coloplast does not stick well to his skin        Assessment and Plan     1. Attention to urostomy    2. Non-healing surgical wound, subsequent encounter        Chemical cautery to wound and treat with IODOSORB daily "   Changed to Patrick 1 pc to try and gave 3   Send me pic next week on wound progress  Get  WOUNDVITE and take  I spent a total of 40 minutes on the day of the visit.  This includes face to face time and non-face to face time preparing to see the patient (eg, review of tests), obtaining and/or reviewing separately obtained history, documenting clinical information in the electronic or other health record, independently interpreting results and communicating results to the patient/family/caregiver, or care coordinator.             No follow-ups on file.

## 2025-02-12 ENCOUNTER — PATIENT MESSAGE (OUTPATIENT)
Dept: WOUND CARE | Facility: CLINIC | Age: 75
End: 2025-02-12
Payer: MEDICARE

## 2025-02-18 ENCOUNTER — PATIENT MESSAGE (OUTPATIENT)
Dept: WOUND CARE | Facility: CLINIC | Age: 75
End: 2025-02-18
Payer: MEDICARE

## 2025-02-28 ENCOUNTER — PATIENT MESSAGE (OUTPATIENT)
Dept: WOUND CARE | Facility: CLINIC | Age: 75
End: 2025-02-28
Payer: MEDICARE

## 2025-03-05 ENCOUNTER — OFFICE VISIT (OUTPATIENT)
Dept: WOUND CARE | Facility: CLINIC | Age: 75
End: 2025-03-05
Payer: MEDICARE

## 2025-03-05 DIAGNOSIS — Z43.6 ATTENTION TO UROSTOMY: ICD-10-CM

## 2025-03-05 DIAGNOSIS — T81.89XD NON-HEALING SURGICAL WOUND, SUBSEQUENT ENCOUNTER: Primary | ICD-10-CM

## 2025-03-05 PROCEDURE — 99999 PR PBB SHADOW E&M-EST. PATIENT-LVL III: CPT | Mod: PBBFAC,,, | Performed by: CLINICAL NURSE SPECIALIST

## 2025-03-05 PROCEDURE — 1159F MED LIST DOCD IN RCRD: CPT | Mod: CPTII,S$GLB,, | Performed by: CLINICAL NURSE SPECIALIST

## 2025-03-05 PROCEDURE — 1157F ADVNC CARE PLAN IN RCRD: CPT | Mod: CPTII,S$GLB,, | Performed by: CLINICAL NURSE SPECIALIST

## 2025-03-05 PROCEDURE — 1160F RVW MEDS BY RX/DR IN RCRD: CPT | Mod: CPTII,S$GLB,, | Performed by: CLINICAL NURSE SPECIALIST

## 2025-03-05 PROCEDURE — 99213 OFFICE O/P EST LOW 20 MIN: CPT | Mod: S$GLB,,, | Performed by: CLINICAL NURSE SPECIALIST

## 2025-03-05 NOTE — PROGRESS NOTES
Subjective     Patient ID: Rhett Johnson is a 74 y.o. male.    Chief Complaint: Wound Check and Urostomy    This is a fu visit for wound check , this is pt is post urostomy done 9/16/24 by Dr tubbs .   He has this small part of wound that did not heal apparently post op . His wife has been diligently caring for the wound as he must be healed in order to get the clearance for his back surgery he is in great need of.  Pt sites pain in back worsening and walking getting harder       Review of Systems   Constitutional:  Positive for activity change. Negative for fever.        Has back issues and pain when walking.    Gastrointestinal:         A bit more bowel urgency then he is used to    Genitourinary:  Negative for hematuria.        Excess mucous noted in urostomy pouch     Integumentary:  Positive for wound.          Objective     Physical Exam  Constitutional:       Appearance: Normal appearance.   Pulmonary:      Effort: Pulmonary effort is normal. No respiratory distress.   Abdominal:      Tenderness: There is no abdominal tenderness.   Neurological:      Mental Status: He is alert.           3/5/25 This wound has filled in but still has a 2 mm x 2 mm x 2 mm ( divet ) that is actually HYPERGRANULATION TISSUE .  This can be very stubborn to mange but I have advised wife she will need to touch the wound daily with silver nitrate to help it complete its healing and epithelization .   Talked about vitamin and wife forgot, she seems overwhelmed, she had not done silver nitrate as I had hoped          The bottom of wound has proud flesh which I used silver nitrate to cauterize,    PROCEDURE:  CHEMICAL CAUTERY: Performed for hypergranulation tissue/granuloma(s) of abdominal wound . The tissue was anesthetized topically with application of Lidocaine gel 2% and 5 minute wait time. The area noted was cauterized with AGNO3. The patient stated pain was 0 on 1-10 scale with this procedure.  Instructed to pack daily with  "IODOSORB and small bit of guaze or strip packing and clean with Vashe. Instructed   Ostomy  I also changed his urostomy pouch to a one piece [place] convex  7/8" and see if this adheres better as he expressed coloplast does not stick well to his skin        Assessment and Plan     1. Non-healing surgical wound, subsequent encounter    2. Attention to urostomy          Chemical cautery to wound daily   Send me pic next week on wound progress  Get  WOUNDVITE and take as she forgot to order this   I spent a total of 20 minutes on the day of the visit.  This includes face to face time and non-face to face time preparing to see the patient (eg, review of tests), obtaining and/or reviewing separately obtained history, documenting clinical information in the electronic or other health record, independently interpreting results and communicating results to the patient/family/caregiver, or care coordinator.                     "

## 2025-03-11 ENCOUNTER — PATIENT MESSAGE (OUTPATIENT)
Dept: WOUND CARE | Facility: CLINIC | Age: 75
End: 2025-03-11
Payer: MEDICARE

## 2025-03-26 ENCOUNTER — PATIENT MESSAGE (OUTPATIENT)
Dept: WOUND CARE | Facility: CLINIC | Age: 75
End: 2025-03-26
Payer: MEDICARE

## 2025-03-27 ENCOUNTER — PATIENT MESSAGE (OUTPATIENT)
Dept: WOUND CARE | Facility: CLINIC | Age: 75
End: 2025-03-27
Payer: MEDICARE

## 2025-05-13 ENCOUNTER — OFFICE VISIT (OUTPATIENT)
Dept: UROLOGY | Facility: CLINIC | Age: 75
End: 2025-05-13
Payer: MEDICARE

## 2025-05-13 VITALS
WEIGHT: 152.13 LBS | SYSTOLIC BLOOD PRESSURE: 114 MMHG | OXYGEN SATURATION: 97 % | BODY MASS INDEX: 21.83 KG/M2 | DIASTOLIC BLOOD PRESSURE: 65 MMHG | HEART RATE: 81 BPM

## 2025-05-13 DIAGNOSIS — C67.8 MALIGNANT NEOPLASM OF OVERLAPPING SITES OF BLADDER: Primary | ICD-10-CM

## 2025-05-13 PROCEDURE — 3074F SYST BP LT 130 MM HG: CPT | Mod: CPTII,S$GLB,, | Performed by: UROLOGY

## 2025-05-13 PROCEDURE — 1125F AMNT PAIN NOTED PAIN PRSNT: CPT | Mod: CPTII,S$GLB,, | Performed by: UROLOGY

## 2025-05-13 PROCEDURE — 1160F RVW MEDS BY RX/DR IN RCRD: CPT | Mod: CPTII,S$GLB,, | Performed by: UROLOGY

## 2025-05-13 PROCEDURE — 1159F MED LIST DOCD IN RCRD: CPT | Mod: CPTII,S$GLB,, | Performed by: UROLOGY

## 2025-05-13 PROCEDURE — 3008F BODY MASS INDEX DOCD: CPT | Mod: CPTII,S$GLB,, | Performed by: UROLOGY

## 2025-05-13 PROCEDURE — 99215 OFFICE O/P EST HI 40 MIN: CPT | Mod: S$GLB,,, | Performed by: UROLOGY

## 2025-05-13 PROCEDURE — 99999 PR PBB SHADOW E&M-EST. PATIENT-LVL IV: CPT | Mod: PBBFAC,,, | Performed by: UROLOGY

## 2025-05-13 PROCEDURE — 1157F ADVNC CARE PLAN IN RCRD: CPT | Mod: CPTII,S$GLB,, | Performed by: UROLOGY

## 2025-05-13 PROCEDURE — 3078F DIAST BP <80 MM HG: CPT | Mod: CPTII,S$GLB,, | Performed by: UROLOGY

## 2025-05-13 RX ORDER — MAGNESIUM 250 MG
1 TABLET ORAL 2 TIMES DAILY
COMMUNITY

## 2025-05-13 RX ORDER — METHOCARBAMOL 750 MG/1
750 TABLET, FILM COATED ORAL 3 TIMES DAILY PRN
COMMUNITY

## 2025-05-13 NOTE — PATIENT INSTRUCTIONS
Obtain CT scan chest abdomen pelvis  Obtain labs including CMP and CBC  Notify patient of results when available  Patient will follow up every 6 months  Patient to follow up with orthopedist concerning groin pain and appropriate therapy since having his back surgery.

## 2025-05-13 NOTE — PROGRESS NOTES
Subjective:      Patient ID: Rhett Johnson is a 74 y.o. male.    Chief Complaint: Follow-up (6 month follow up )    Mr. Johnson is a 74-year-old gentleman with a history of prostate cancer treated in 2001.  The patient was seen in the office last year and found to have bladder cancer.  He was referred into [facility] to Dr. Bard Cummings for treatment of high-grade muscle invasive bladder cancer.  The patient underwent cystectomy and lymphadenectomy.  His lymph nodes were negative his CT scan did not show any metastatic disease.  His pathology was pT3 N0 N0 with no evidence of cancer extending outside of the surgical margins.  The patient was sent for follow up with me as either them closer to his house and he will undergo CT chest abdomen pelvis every 6 months as well as chemistry monitoring comprehensive metabolic panel and CBC.  The patient has complained of some groin pain on the right side which which he probably pulled moving a freezer several weeks before his recent back surgery.  The patient had back surgery with rods going from S2 all the way to L3 bilaterally.  The patient is doing well from his back surgery but is still having groin pain.  He is unable to take anti-inflammatories secondary to the back surgery and then impedance on bone growth.  Patient is going to see his orthopedic surgeon this week.  Will see about physical therapy for his groin.  In the interim I have recommended heat to the area intermittently.    Follow-up  This is a chronic problem. The current episode started more than 1 month ago. The problem occurs constantly. The problem has been rapidly improving. Pertinent negatives include no abdominal pain, anorexia, arthralgias, change in bowel habit, chest pain, chills, congestion, coughing, diaphoresis, fatigue, fever, headaches, joint swelling, myalgias, nausea, neck pain, numbness, rash, sore throat, swollen glands, urinary symptoms, vertigo, visual change, vomiting or weakness. Nothing  aggravates the symptoms. He has tried nothing for the symptoms. The treatment provided significant relief.     Review of Systems   Constitutional:  Negative for activity change, appetite change, chills, diaphoresis, fatigue, fever and unexpected weight change.   HENT:  Negative for congestion, hearing loss, sinus pressure, sore throat and trouble swallowing.    Eyes:  Negative for photophobia, pain, discharge and visual disturbance.   Respiratory:  Negative for apnea, cough and shortness of breath.    Cardiovascular:  Negative for chest pain, palpitations and leg swelling.   Gastrointestinal:  Negative for abdominal distention, abdominal pain, anal bleeding, anorexia, blood in stool, change in bowel habit, constipation, diarrhea, nausea, rectal pain and vomiting.   Endocrine: Negative for cold intolerance, heat intolerance, polydipsia, polyphagia and polyuria.   Genitourinary:  Negative for decreased urine volume, difficulty urinating, dysuria, enuresis, flank pain, frequency, genital sores, hematuria, penile discharge, penile pain, penile swelling, scrotal swelling, testicular pain and urgency.   Musculoskeletal:  Negative for arthralgias, back pain, joint swelling, myalgias and neck pain.   Skin:  Negative for color change, pallor, rash and wound.   Allergic/Immunologic: Negative for environmental allergies, food allergies and immunocompromised state.   Neurological:  Negative for dizziness, vertigo, seizures, weakness, numbness and headaches.   Hematological:  Negative for adenopathy. Does not bruise/bleed easily.   Psychiatric/Behavioral: Negative.        Objective:     Physical Exam  Vitals and nursing note reviewed.   Constitutional:       Appearance: Normal appearance. He is well-developed.   HENT:      Head: Normocephalic and atraumatic.      Right Ear: External ear normal. There is no impacted cerumen.      Left Ear: External ear normal. There is no impacted cerumen.      Nose: Nose normal. No congestion or  rhinorrhea.      Mouth/Throat:      Mouth: Mucous membranes are moist.      Pharynx: Oropharynx is clear. No oropharyngeal exudate or posterior oropharyngeal erythema.   Eyes:      General: No scleral icterus.        Right eye: No discharge.         Left eye: No discharge.      Extraocular Movements: Extraocular movements intact.      Conjunctiva/sclera: Conjunctivae normal.      Pupils: Pupils are equal, round, and reactive to light.   Cardiovascular:      Rate and Rhythm: Normal rate and regular rhythm.      Heart sounds: Normal heart sounds.   Pulmonary:      Effort: Pulmonary effort is normal.   Abdominal:      General: Bowel sounds are normal. There is no distension.      Palpations: Abdomen is soft. There is no mass.      Tenderness: There is no abdominal tenderness. There is no right CVA tenderness, left CVA tenderness, guarding or rebound.      Hernia: No hernia is present.   Genitourinary:     Comments: Patient with no CVA tenderness, normal penis and scrotum.  Bladder nontender.  Musculoskeletal:         General: Normal range of motion.      Cervical back: Normal range of motion and neck supple.   Skin:     General: Skin is warm and dry.      Capillary Refill: Capillary refill takes 2 to 3 seconds.   Neurological:      Mental Status: He is alert and oriented to person, place, and time.      Deep Tendon Reflexes: Reflexes are normal and symmetric.   Psychiatric:         Behavior: Behavior normal.         Thought Content: Thought content normal.         Judgment: Judgment normal.        Assessment:      1. Malignant neoplasm of overlapping sites of bladder      Plan:     Patient Instructions   Obtain CT scan chest abdomen pelvis  Obtain labs including CMP and CBC  Notify patient of results when available  Patient will follow up every 6 months  Patient to follow up with orthopedist concerning groin pain and appropriate therapy since having his back surgery.

## 2025-05-15 PROBLEM — N17.9 AKI (ACUTE KIDNEY INJURY): Status: ACTIVE | Noted: 2025-05-15

## 2025-05-15 PROBLEM — A41.9 SEPSIS SECONDARY TO UTI: Status: ACTIVE | Noted: 2025-05-15

## 2025-05-15 PROBLEM — R10.30 GROIN PAIN: Status: ACTIVE | Noted: 2025-05-15

## 2025-05-15 PROBLEM — N39.0 SEPSIS SECONDARY TO UTI: Status: ACTIVE | Noted: 2025-05-15

## 2025-05-20 ENCOUNTER — TELEPHONE (OUTPATIENT)
Dept: UROLOGY | Facility: CLINIC | Age: 75
End: 2025-05-20
Payer: MEDICARE

## 2025-05-20 NOTE — TELEPHONE ENCOUNTER
I returned pt's call and explained that Dr. Bai had not yet reviewed the results but that I would call her as soon as I have his interpretation/recommendations. She verbalized understanding.

## 2025-05-20 NOTE — TELEPHONE ENCOUNTER
----- Message from Fatou sent at 5/20/2025  9:37 AM CDT -----  Regarding: Pt's Wife Rhina called to speak to Nurse (Gina) regarding the pt's bone test results and she would like a call back this morning  Type:  Test ResultsWho Called:  Pt's Wife Rhina called to speak to Nurse (Gina) regarding the pt's bone test results and she would like a call back this morningName of Test (Lab/Mammo/Etc): Bone TestingDate of Test:  5/16/25 Ordering Provider: NIKO BRUNNERWhere the test was performed[facility] the [facility] rather a call back or a response via [facility]? [facility]: [phone]

## 2025-05-22 ENCOUNTER — PATIENT MESSAGE (OUTPATIENT)
Dept: UROLOGY | Facility: CLINIC | Age: 75
End: 2025-05-22
Payer: MEDICARE

## 2025-05-22 ENCOUNTER — TELEPHONE (OUTPATIENT)
Dept: HEMATOLOGY/ONCOLOGY | Facility: CLINIC | Age: 75
End: 2025-05-22
Payer: MEDICARE

## 2025-06-02 ENCOUNTER — OFFICE VISIT (OUTPATIENT)
Dept: HEMATOLOGY/ONCOLOGY | Facility: CLINIC | Age: 75
End: 2025-06-02
Payer: MEDICARE

## 2025-06-02 VITALS
TEMPERATURE: 98 F | OXYGEN SATURATION: 99 % | BODY MASS INDEX: 22.03 KG/M2 | HEIGHT: 70 IN | SYSTOLIC BLOOD PRESSURE: 156 MMHG | RESPIRATION RATE: 18 BRPM | HEART RATE: 91 BPM | DIASTOLIC BLOOD PRESSURE: 74 MMHG | WEIGHT: 153.88 LBS

## 2025-06-02 DIAGNOSIS — C67.9 BLADDER CANCER METASTASIZED TO PELVIC REGION: ICD-10-CM

## 2025-06-02 DIAGNOSIS — C79.89 BLADDER CANCER METASTASIZED TO PELVIC REGION: ICD-10-CM

## 2025-06-02 DIAGNOSIS — C67.8 CANCER OF OVERLAPPING SITES OF BLADDER: ICD-10-CM

## 2025-06-02 DIAGNOSIS — Z90.6 H/O TOTAL CYSTECTOMY: ICD-10-CM

## 2025-06-02 DIAGNOSIS — C67.8 MALIGNANT NEOPLASM OF OVERLAPPING SITES OF BLADDER: Primary | ICD-10-CM

## 2025-06-02 DIAGNOSIS — N18.31 CHRONIC KIDNEY DISEASE, STAGE 3A: ICD-10-CM

## 2025-06-02 DIAGNOSIS — F17.219 NICOTINE DEPENDENCE, CIGARETTES, W UNSP DISORDERS: ICD-10-CM

## 2025-06-02 DIAGNOSIS — Z85.46 HISTORY OF PROSTATE CANCER: ICD-10-CM

## 2025-06-02 DIAGNOSIS — C68.9: ICD-10-CM

## 2025-06-02 DIAGNOSIS — M89.9 BONE LESION: ICD-10-CM

## 2025-06-02 PROCEDURE — 1125F AMNT PAIN NOTED PAIN PRSNT: CPT | Mod: CPTII,S$GLB,, | Performed by: INTERNAL MEDICINE

## 2025-06-02 PROCEDURE — 1159F MED LIST DOCD IN RCRD: CPT | Mod: CPTII,S$GLB,, | Performed by: INTERNAL MEDICINE

## 2025-06-02 PROCEDURE — 3077F SYST BP >= 140 MM HG: CPT | Mod: CPTII,S$GLB,, | Performed by: INTERNAL MEDICINE

## 2025-06-02 PROCEDURE — 1160F RVW MEDS BY RX/DR IN RCRD: CPT | Mod: CPTII,S$GLB,, | Performed by: INTERNAL MEDICINE

## 2025-06-02 PROCEDURE — 3078F DIAST BP <80 MM HG: CPT | Mod: CPTII,S$GLB,, | Performed by: INTERNAL MEDICINE

## 2025-06-02 PROCEDURE — 1157F ADVNC CARE PLAN IN RCRD: CPT | Mod: CPTII,S$GLB,, | Performed by: INTERNAL MEDICINE

## 2025-06-02 PROCEDURE — 99215 OFFICE O/P EST HI 40 MIN: CPT | Mod: S$GLB,,, | Performed by: INTERNAL MEDICINE

## 2025-06-02 PROCEDURE — 1111F DSCHRG MED/CURRENT MED MERGE: CPT | Mod: CPTII,S$GLB,, | Performed by: INTERNAL MEDICINE

## 2025-06-02 PROCEDURE — 3008F BODY MASS INDEX DOCD: CPT | Mod: CPTII,S$GLB,, | Performed by: INTERNAL MEDICINE

## 2025-06-02 PROCEDURE — 4010F ACE/ARB THERAPY RXD/TAKEN: CPT | Mod: CPTII,S$GLB,, | Performed by: INTERNAL MEDICINE

## 2025-06-02 PROCEDURE — 99999 PR PBB SHADOW E&M-EST. PATIENT-LVL V: CPT | Mod: PBBFAC,,, | Performed by: INTERNAL MEDICINE

## 2025-06-02 RX ORDER — GABAPENTIN 600 MG/1
600 TABLET ORAL 3 TIMES DAILY
COMMUNITY
End: 2025-06-05 | Stop reason: SDUPTHER

## 2025-06-02 RX ORDER — CYCLOBENZAPRINE HCL 10 MG
10 TABLET ORAL 3 TIMES DAILY PRN
COMMUNITY
End: 2025-06-05 | Stop reason: SDUPTHER

## 2025-06-03 ENCOUNTER — TUMOR BOARD CONFERENCE (OUTPATIENT)
Dept: UROLOGY | Facility: HOSPITAL | Age: 75
End: 2025-06-03
Payer: MEDICARE

## 2025-06-04 ENCOUNTER — TELEPHONE (OUTPATIENT)
Dept: HEMATOLOGY/ONCOLOGY | Facility: CLINIC | Age: 75
End: 2025-06-04
Payer: MEDICARE

## 2025-06-04 DIAGNOSIS — C67.8 CANCER OF OVERLAPPING SITES OF BLADDER: Primary | ICD-10-CM

## 2025-06-04 DIAGNOSIS — M89.9 BONE LESION: ICD-10-CM

## 2025-06-05 ENCOUNTER — TELEPHONE (OUTPATIENT)
Dept: FAMILY MEDICINE | Facility: CLINIC | Age: 75
End: 2025-06-05
Payer: MEDICARE

## 2025-06-05 ENCOUNTER — TELEPHONE (OUTPATIENT)
Dept: INTERVENTIONAL RADIOLOGY/VASCULAR | Facility: CLINIC | Age: 75
End: 2025-06-05
Payer: MEDICARE

## 2025-06-05 ENCOUNTER — OFFICE VISIT (OUTPATIENT)
Dept: FAMILY MEDICINE | Facility: CLINIC | Age: 75
End: 2025-06-05
Payer: MEDICARE

## 2025-06-05 VITALS
WEIGHT: 150.81 LBS | BODY MASS INDEX: 21.59 KG/M2 | HEART RATE: 92 BPM | OXYGEN SATURATION: 96 % | HEIGHT: 70 IN | DIASTOLIC BLOOD PRESSURE: 58 MMHG | SYSTOLIC BLOOD PRESSURE: 90 MMHG

## 2025-06-05 DIAGNOSIS — N18.31 CHRONIC KIDNEY DISEASE, STAGE 3A: ICD-10-CM

## 2025-06-05 DIAGNOSIS — I10 ESSENTIAL HYPERTENSION: ICD-10-CM

## 2025-06-05 DIAGNOSIS — Z85.46 HISTORY OF PROSTATE CANCER: ICD-10-CM

## 2025-06-05 DIAGNOSIS — E78.5 HYPERLIPIDEMIA, UNSPECIFIED HYPERLIPIDEMIA TYPE: ICD-10-CM

## 2025-06-05 DIAGNOSIS — I73.9 PERIPHERAL ARTERY DISEASE: Primary | ICD-10-CM

## 2025-06-05 PROCEDURE — 99999 PR PBB SHADOW E&M-EST. PATIENT-LVL III: CPT | Mod: PBBFAC,,, | Performed by: STUDENT IN AN ORGANIZED HEALTH CARE EDUCATION/TRAINING PROGRAM

## 2025-06-05 RX ORDER — CYCLOBENZAPRINE HCL 10 MG
10 TABLET ORAL 3 TIMES DAILY PRN
Qty: 180 TABLET | Refills: 0 | Status: SHIPPED | OUTPATIENT
Start: 2025-06-05 | End: 2025-09-03

## 2025-06-05 RX ORDER — TIOTROPIUM BROMIDE 18 UG/1
18 CAPSULE ORAL; RESPIRATORY (INHALATION) EVERY 6 HOURS PRN
COMMUNITY

## 2025-06-05 RX ORDER — GABAPENTIN 600 MG/1
600 TABLET ORAL 3 TIMES DAILY
Status: CANCELLED | OUTPATIENT
Start: 2025-06-05

## 2025-06-05 RX ORDER — UMECLIDINIUM 62.5 UG/1
AEROSOL, POWDER ORAL DAILY
Status: CANCELLED | OUTPATIENT
Start: 2025-06-05

## 2025-06-05 RX ORDER — ALBUTEROL SULFATE 90 UG/1
2 INHALANT RESPIRATORY (INHALATION) EVERY 6 HOURS PRN
Qty: 18 G | Refills: 11 | Status: SHIPPED | OUTPATIENT
Start: 2025-06-05

## 2025-06-05 RX ORDER — UMECLIDINIUM 62.5 UG/1
62.5 AEROSOL, POWDER ORAL DAILY
Qty: 90 CAPSULE | Refills: 0 | Status: SHIPPED | OUTPATIENT
Start: 2025-06-05 | End: 2025-09-03

## 2025-06-05 RX ORDER — GABAPENTIN 600 MG/1
600 TABLET ORAL 3 TIMES DAILY
Qty: 270 TABLET | Refills: 0 | Status: SHIPPED | OUTPATIENT
Start: 2025-06-05 | End: 2025-09-03

## 2025-06-05 RX ORDER — ALBUTEROL SULFATE 90 UG/1
2 INHALANT RESPIRATORY (INHALATION) EVERY 6 HOURS PRN
Qty: 18 G | Refills: 11 | Status: CANCELLED | OUTPATIENT
Start: 2025-06-05

## 2025-06-10 DIAGNOSIS — M89.9 BONE LESION: ICD-10-CM

## 2025-06-10 RX ORDER — LIDOCAINE HYDROCHLORIDE 10 MG/ML
1 INJECTION, SOLUTION EPIDURAL; INFILTRATION; INTRACAUDAL; PERINEURAL ONCE
OUTPATIENT
Start: 2025-06-10 | End: 2025-06-10

## 2025-06-11 DIAGNOSIS — N18.31 CHRONIC KIDNEY DISEASE, STAGE 3A: ICD-10-CM

## 2025-06-13 PROCEDURE — 82043 UR ALBUMIN QUANTITATIVE: CPT | Performed by: STUDENT IN AN ORGANIZED HEALTH CARE EDUCATION/TRAINING PROGRAM

## 2025-06-18 ENCOUNTER — TELEPHONE (OUTPATIENT)
Dept: INTERVENTIONAL RADIOLOGY/VASCULAR | Facility: HOSPITAL | Age: 75
End: 2025-06-18
Payer: MEDICARE

## 2025-06-19 ENCOUNTER — HOSPITAL ENCOUNTER (OUTPATIENT)
Dept: RADIOLOGY | Facility: HOSPITAL | Age: 75
Discharge: HOME | End: 2025-06-19
Attending: INTERNAL MEDICINE
Payer: MEDICARE

## 2025-06-19 VITALS
BODY MASS INDEX: 21.47 KG/M2 | HEART RATE: 75 BPM | TEMPERATURE: 98 F | WEIGHT: 150 LBS | SYSTOLIC BLOOD PRESSURE: 173 MMHG | HEIGHT: 70 IN | DIASTOLIC BLOOD PRESSURE: 70 MMHG | RESPIRATION RATE: 24 BRPM | OXYGEN SATURATION: 99 %

## 2025-06-19 DIAGNOSIS — C67.8 CANCER OF OVERLAPPING SITES OF BLADDER: ICD-10-CM

## 2025-06-19 DIAGNOSIS — M89.9 BONE LESION: ICD-10-CM

## 2025-06-19 PROCEDURE — 63600175 PHARM REV CODE 636 W HCPCS: Performed by: RADIOLOGY

## 2025-06-19 PROCEDURE — 99152 MOD SED SAME PHYS/QHP 5/>YRS: CPT

## 2025-06-19 PROCEDURE — 27201423 OPTIME MED/SURG SUP & DEVICES STERILE SUPPLY

## 2025-06-19 PROCEDURE — 99153 MOD SED SAME PHYS/QHP EA: CPT

## 2025-06-19 RX ORDER — FENTANYL CITRATE 50 UG/ML
INJECTION, SOLUTION INTRAMUSCULAR; INTRAVENOUS
Status: COMPLETED | OUTPATIENT
Start: 2025-06-19 | End: 2025-06-19

## 2025-06-19 RX ORDER — MIDAZOLAM HYDROCHLORIDE 1 MG/ML
INJECTION, SOLUTION INTRAMUSCULAR; INTRAVENOUS
Status: COMPLETED | OUTPATIENT
Start: 2025-06-19 | End: 2025-06-19

## 2025-06-19 RX ORDER — LIDOCAINE HYDROCHLORIDE 10 MG/ML
INJECTION, SOLUTION INFILTRATION; PERINEURAL
Status: COMPLETED | OUTPATIENT
Start: 2025-06-19 | End: 2025-06-19

## 2025-06-19 RX ADMIN — LIDOCAINE HYDROCHLORIDE 5 ML: 10 INJECTION, SOLUTION INFILTRATION; PERINEURAL at 01:06

## 2025-06-19 RX ADMIN — FENTANYL CITRATE 50 MCG: 50 INJECTION, SOLUTION INTRAMUSCULAR; INTRAVENOUS at 01:06

## 2025-06-19 RX ADMIN — MIDAZOLAM 2 MG: 1 INJECTION INTRAMUSCULAR; INTRAVENOUS at 01:06

## 2025-06-19 NOTE — NURSING
Pt VSS and in NAD. PIV and CRM equipment removed. Discharge instructions and AVS provided. Pt verbalized understanding, questions and concerns addressed. No further needs at this time. Pt discharged from recovery area at 1429.

## 2025-06-19 NOTE — DISCHARGE INSTRUCTIONS
BIOPSY DISCHARGE EDUCATION       Information:   A biopsy is a procedure in which a biopsy needle is used to remove small amounts of tissue to evaluate for organ dysfunction or the presence of cancerous versus benign (non-cancerous) tissue.      What should I expect after the biopsy?      There may be some soreness around the biopsy site.    You may return to work after 24 hours unless your primary doctor instructs you otherwise.    You do not have any diet restrictions because of this procedure but should continue any that were given to you by any other doctors.    Continue all previously prescribed medications with the exception of Coumadin/warfarin which should be resumed after 24 hours. Pradaxa, Xarelto, Eliquis or Savaysa can be resumed after 48 hours.     Bathing & Wound Care:    You may shower after 24 hours; do not tub bath or submerge in water for 3 days (bath tub, hot tub, swimming pool, river or any other body of water).    Dressing to stay on 2-3 days (clean and dry)    If the biopsy site(s) become red, tender, swollen, or starts to drain, contact us.    Avoid heavy lifting and strenuous activity for 3 days.     Follow-up visit information:   Your ordering physician will typically get your biopsy results in three to five business days. If you do not hear from the ordering physician in 7 business days, please call his/her office to set up an appointment to review the results. Follow up with Interventional Radiology is not usually necessary.       Occasionally, a situation will require prompt attention and an emergency room visit is necessary:    Sudden chest pain, shortness of breath, or fainting    Increasing redness, swelling or drainage from the biopsy site    Increasing pain not relieved by medication    Bleeding or drainage from the needle site that is saturating the dressing    You have shaking, chills and/or a temperature over 100.3°F    New, sudden difficulty breathing    Drop in blood pressure,  and/or light-headed feeling    Interventional Radiology Clinic   For complications   [phone]. Monday - Friday, 8:00 am - 4:00 pm    [phone] After hours and on holidays. Ask to speak with the interventional radiologist on call.     For Scheduling   [phone] Monday - Friday, 8:00 am - 4:00 pm terventional radiologist on call.

## 2025-06-19 NOTE — H&P
Radiology History & Physical      SUBJECTIVE:     Chief Complaint: pubic symphysis lesion    History of Present Illness:  Rhett Johnson is a 74 y.o. male who presents for CT-guided pubic symphysis lesion biopsy.     Past Medical History:   Diagnosis Date    Anemia 09/09/2024    Asthma     Cancer     Carotid occlusion, left     100% blockage    COPD (chronic obstructive pulmonary disease)     Current tobacco use 08/20/2018    Deep vein thrombosis     Disorder of kidney and ureter     DVT (deep venous thrombosis)     Elevated WBC count 09/10/2024    Gross hematuria 07/30/2024    Hx of hepatitis     Hyperlipidemia     Hypertension     on medication monitoring    Hypertensive heart and chronic kidney disease with heart failure and stage 1 through stage 4 chronic kidney disease, or unspecified chronic kidney disease 08/29/2024    Documented on 01/03/2023      Peripheral artery disease     Prostate cancer 2001    Dr. Bai     PVCs (premature ventricular contractions) 09/19/2024    Previously symptomatic, now resolved on coreg      Urinary tract infection, site not specified      Past Surgical History:   Procedure Laterality Date    ADENOIDECTOMY      APPENDECTOMY      Arthroscopic left knee Left     BARBOTAGE OF BLADDER N/A 7/30/2024    Procedure: BARBOTAGE, BLADDER;  Surgeon: Fabian Bai MD;  Location: [facility];  Service: Urology;  Laterality: N/A;    BARBOTAGE OF URETER Bilateral 7/30/2024    Procedure: BARBOTAGE, URETER;  Surgeon: Fabian Bai MD;  Location: [facility] OR;  Service: Urology;  Laterality: Bilateral;    CARDIAC CATHETERIZATION      no stents    COLONOSCOPY      CREATION, ILEAL CONDUIT N/A 9/16/2024    Procedure: CREATION, ILEAL CONDUIT;  Surgeon: Hari Salguero MD;  Location: [address];  Service: Urology;  Laterality: N/A;    CYSTOSCOPY W/ RETROGRADES Bilateral 7/30/2024    Procedure: CYSTOSCOPY, WITH RETROGRADE PYELOGRAM;  Surgeon: Fabian Bai MD;  Location: [facility] OR;   Service: Urology;  Laterality: Bilateral;    CYSTOSCOPY W/ URETERAL STENT PLACEMENT Left 7/30/2024    Procedure: CYSTOSCOPY, WITH URETERAL STENT INSERTION;  Surgeon: Fabian Bai MD;  Location: [facility] OR;  Service: Urology;  Laterality: Left;    DILATION OF URETER Left 7/30/2024    Procedure: DILATION, URETER;  Surgeon: Fabian Bai MD;  Location: [facility] OR;  Service: Urology;  Laterality: Left;    DILATION OF URETHRA N/A 7/30/2024    Procedure: DILATION, URETHRA;  Surgeon: Fabian Bai MD;  Location: [facility] OR;  Service: Urology;  Laterality: N/A;    INSERTION OF MULTIFOCAL INTRAOCULAR LENS Left 12/12/2018    Procedure: INSERTION, IOL, MULTIFOCAL;  Surgeon: Eleanor Seaman MD;  Location: [facility] OR;  Service: Ophthalmology;  Laterality: Left;    INSERTION OF MULTIFOCAL INTRAOCULAR LENS Right 4/3/2019    Procedure: INSERTION, IOL, MULTIFOCAL;  Surgeon: Eleanor Seaman MD;  Location: [facility] OR;  Service: Ophthalmology;  Laterality: Right;    NECK SURGERY  2002    PHACOEMULSIFICATION OF CATARACT Left 12/12/2018    Procedure: PHACOEMULSIFICATION, CATARACT;  Surgeon: Eleanor Seaman MD;  Location: [facility] OR;  Service: Ophthalmology;  Laterality: Left;    PHACOEMULSIFICATION OF CATARACT Right 4/3/2019    Procedure: PHACOEMULSIFICATION, CATARACT;  Surgeon: Eleanor Seaman MD;  Location: [facility] OR;  Service: Ophthalmology;  Laterality: Right;    PROSTATE SURGERY  2001    Radical prostectemy     RADICAL CYSTECTOMY N/A 9/16/2024    Procedure: CYSTECTOMY, RADICAL;  Surgeon: Hari Salguero MD;  Location: [address];  Service: Urology;  Laterality: N/A;    SPINE SURGERY  2002    Fusion of c4,c5,c6    TONSILLECTOMY      TURBT (TRANSURETHRAL RESECTION OF BLADDER TUMOR) N/A 7/30/2024    Procedure: TURBT (TRANSURETHRAL RESECTION OF BLADDER TUMOR);  Surgeon: Fabian Bai MD;  Location: [facility] OR;  Service: Urology;  Laterality: N/A;    URETEROSCOPY Bilateral 7/30/2024    Procedure: URETEROSCOPY;  Surgeon: Fabian Bai MD;   Location: [facility];  Service: Urology;  Laterality: Bilateral;       Home Meds:   Prior to Admission medications    Medication Sig Start Date End Date Taking? Authorizing Provider   acetaminophen (TYLENOL) 80 MG Chew Take by mouth.   Yes Provider, Historical   ascorbic acid, vitamin C, (VITAMIN C) 100 MG tablet Take 100 mg by mouth.   Yes Provider, Historical   carvediloL (COREG) 12.5 MG tablet  8/2/24  Yes Provider, Historical   cholecalciferol, vitamin D3, 10 mcg (400 unit) Cap capsule Take 1 tablet by mouth.   Yes Provider, Historical   cyclobenzaprine (FLEXERIL) 10 MG tablet Take 1 tablet (10 mg total) by mouth 3 (three) times daily as needed for Muscle spasms. 6/5/25 9/3/25 Yes Eve Mckinney MD   gabapentin (NEURONTIN) 600 MG tablet Take 1 tablet (600 mg total) by mouth 3 (three) times daily. 6/5/25 9/3/25 Yes Eve Mckinnye MD   magnesium 250 mg Tab Take 1 tablet by mouth 2 (two) times daily.   Yes Provider, Historical   multivitamin (THERAGRAN) per tablet Take 1 tablet by mouth once daily.   Yes Provider, Historical   rosuvastatin (CRESTOR) 40 MG Tab Take 40 mg by mouth. 6/17/23  Yes Provider, Historical   zinc gluconate 50 mg tablet Take 50 mg by mouth. 2 tablets per day   Yes Provider, Historical   albuterol (PROVENTIL/VENTOLIN HFA) 90 mcg/actuation inhaler Inhale 2 puffs into the lungs every 6 (six) hours as needed for Wheezing. Rescue 6/5/25   Eve Mckinney MD   aspirin (ECOTRIN) 81 MG EC tablet Take 81 mg by mouth once daily.    Provider, Historical   furosemide (LASIX) 40 MG tablet daily as needed. 3/2/23   Provider, Historical   tiotropium (SPIRIVA) 18 mcg inhalation capsule Inhale 18 mcg into the lungs every 6 (six) hours as needed.    Provider, Historical   umeclidinium (INCRUSE ELLIPTA) 62.5 mcg/actuation inhalation capsule Inhale 62.5 mcg into the lungs once daily. Controller 6/5/25 9/3/25  Eve Mckinney MD     Anticoagulants/Antiplatelets: no anticoagulation    Allergies:    Review of patient's allergies indicates:   Allergen Reactions    Pcn [penicillins] Anaphylaxis     Sedation History:  no adverse reactions          OBJECTIVE:     Vital Signs (Most Recent)  Temp: 98 °F (36.7 °C) (06/19/25 1224)  Pulse: 83 (06/19/25 1320)  Resp: 17 (06/19/25 1320)  BP: (!) 160/74 (06/19/25 1320)  SpO2: 95 % (06/19/25 1320)    Physical Exam:  ASA: 2  Mallampati: 2    General: no acute distress  Mental Status: alert and oriented to person, place and time  HEENT: normocephalic, atraumatic  Chest: unlabored breathing  Heart: regular heart rate  Abdomen: nondistended  Extremity: moves all extremities    Laboratory  Lab Results   Component Value Date    INR 1.0 05/22/2024       Lab Results   Component Value Date    WBC 10.96 05/16/2025    HGB 9.2 (L) 05/16/2025    HCT 29.4 (L) 05/16/2025    MCV 92 05/16/2025     05/16/2025      Lab Results   Component Value Date    GLU 98 05/16/2025     05/16/2025    K 3.5 05/16/2025     05/16/2025    CO2 19 (L) 05/16/2025    BUN 16 05/16/2025    CREATININE 1.4 05/16/2025    CALCIUM 8.7 05/16/2025    MG 1.7 05/15/2025    ALT 12 05/15/2025    AST 12 05/15/2025    ALBUMIN 2.8 (L) 05/15/2025    BILITOT 0.7 05/15/2025    BILIDIR 0.4 (H) 06/12/2024       ASSESSMENT/PLAN:     Sedation Plan: moderate  Patient will undergo CT-guided pubic symphysis lesion biopsy.    Monty Mckinley MD  Interventional Radiology  Department of Radiology

## 2025-06-19 NOTE — SEDATION DOCUMENTATION
Procedure complete, pt tolerated well; vss. Patient alert and oriented x4 unlabored on Room Air. Patient denies pain and is resting comfortably. Surgical site dressed with bandaid. Dressing is clean, dry, and intact. Patient transported to recovery.

## 2025-06-19 NOTE — PROCEDURES
Radiology Post-Procedure Note    Pre Op Diagnosis: Pubic symphysis lesion  Post Op Diagnosis: Same    Procedure: CT-guided pubic symphysis lesion biopsy    Procedure performed by: Monty Mckinley MD    Written Informed Consent Obtained: Yes  Specimen Removed: YES 1, 13g x 20 mm core biopsy  Estimated Blood Loss: Minimal    Findings:   Lytic lesion of the right pubic bone with associated cortical destruction. No complications.     Patient tolerated procedure well.    Monty Mckinley MD  Interventional Radiology  Department of Radiology

## 2025-06-19 NOTE — SEDATION DOCUMENTATION
Pt arrived to CT for pubic symphysis bx. Pt oriented to unit and staff. Plan of care reviewed with patient, patient verbalizes understanding. Comfort measures utilized. Pt safely transferred from stretcher to procedural table. Fall risk reviewed with patient, fall risk interventions maintained. Safety strap applied, positioner pillows utilized to minimize pressure points. Blankets applied. Pt prepped and draped utilizing standard sterile technique. Patient placed on continuous monitoring, as required by sedation policy. Timeouts completed utilizing standard universal time-out, per department and facility policy. RN to remain at bedside, continuous monitoring maintained. Pt resting comfortably. Denies pain/discomfort. Will continue to monitor. See flow sheets for monitoring, medication administration, and updates.

## 2025-06-24 ENCOUNTER — OFFICE VISIT (OUTPATIENT)
Dept: HEMATOLOGY/ONCOLOGY | Facility: CLINIC | Age: 75
End: 2025-06-24
Payer: MEDICARE

## 2025-06-24 ENCOUNTER — TELEPHONE (OUTPATIENT)
Dept: HEMATOLOGY/ONCOLOGY | Facility: CLINIC | Age: 75
End: 2025-06-24

## 2025-06-24 VITALS
TEMPERATURE: 98 F | RESPIRATION RATE: 18 BRPM | DIASTOLIC BLOOD PRESSURE: 60 MMHG | OXYGEN SATURATION: 100 % | HEART RATE: 77 BPM | BODY MASS INDEX: 21.52 KG/M2 | HEIGHT: 70 IN | SYSTOLIC BLOOD PRESSURE: 124 MMHG

## 2025-06-24 DIAGNOSIS — F17.219 NICOTINE DEPENDENCE, CIGARETTES, W UNSP DISORDERS: ICD-10-CM

## 2025-06-24 DIAGNOSIS — C68.9: ICD-10-CM

## 2025-06-24 DIAGNOSIS — C67.8 CANCER OF OVERLAPPING SITES OF BLADDER: ICD-10-CM

## 2025-06-24 DIAGNOSIS — G89.3 CHRONIC NEOPLASM-RELATED PAIN: ICD-10-CM

## 2025-06-24 DIAGNOSIS — Z90.6 H/O TOTAL CYSTECTOMY: ICD-10-CM

## 2025-06-24 DIAGNOSIS — C67.8 MALIGNANT NEOPLASM OF OVERLAPPING SITES OF BLADDER: Primary | ICD-10-CM

## 2025-06-24 DIAGNOSIS — C79.51 SECONDARY MALIGNANT NEOPLASM OF BONE: ICD-10-CM

## 2025-06-24 DIAGNOSIS — Z85.46 HISTORY OF PROSTATE CANCER: ICD-10-CM

## 2025-06-24 DIAGNOSIS — N18.31 CHRONIC KIDNEY DISEASE, STAGE 3A: ICD-10-CM

## 2025-06-24 PROCEDURE — 3078F DIAST BP <80 MM HG: CPT | Mod: CPTII,S$GLB,, | Performed by: INTERNAL MEDICINE

## 2025-06-24 PROCEDURE — 99999 PR PBB SHADOW E&M-EST. PATIENT-LVL V: CPT | Mod: PBBFAC,,, | Performed by: INTERNAL MEDICINE

## 2025-06-24 PROCEDURE — 1160F RVW MEDS BY RX/DR IN RCRD: CPT | Mod: CPTII,S$GLB,, | Performed by: INTERNAL MEDICINE

## 2025-06-24 PROCEDURE — 1159F MED LIST DOCD IN RCRD: CPT | Mod: CPTII,S$GLB,, | Performed by: INTERNAL MEDICINE

## 2025-06-24 PROCEDURE — 99215 OFFICE O/P EST HI 40 MIN: CPT | Mod: S$GLB,,, | Performed by: INTERNAL MEDICINE

## 2025-06-24 PROCEDURE — 3066F NEPHROPATHY DOC TX: CPT | Mod: CPTII,S$GLB,, | Performed by: INTERNAL MEDICINE

## 2025-06-24 PROCEDURE — 4010F ACE/ARB THERAPY RXD/TAKEN: CPT | Mod: CPTII,S$GLB,, | Performed by: INTERNAL MEDICINE

## 2025-06-24 PROCEDURE — 1101F PT FALLS ASSESS-DOCD LE1/YR: CPT | Mod: CPTII,S$GLB,, | Performed by: INTERNAL MEDICINE

## 2025-06-24 PROCEDURE — 3074F SYST BP LT 130 MM HG: CPT | Mod: CPTII,S$GLB,, | Performed by: INTERNAL MEDICINE

## 2025-06-24 PROCEDURE — 3008F BODY MASS INDEX DOCD: CPT | Mod: CPTII,S$GLB,, | Performed by: INTERNAL MEDICINE

## 2025-06-24 PROCEDURE — G2211 COMPLEX E/M VISIT ADD ON: HCPCS | Mod: S$GLB,,, | Performed by: INTERNAL MEDICINE

## 2025-06-24 PROCEDURE — 3288F FALL RISK ASSESSMENT DOCD: CPT | Mod: CPTII,S$GLB,, | Performed by: INTERNAL MEDICINE

## 2025-06-24 PROCEDURE — 1126F AMNT PAIN NOTED NONE PRSNT: CPT | Mod: CPTII,S$GLB,, | Performed by: INTERNAL MEDICINE

## 2025-06-24 PROCEDURE — 1157F ADVNC CARE PLAN IN RCRD: CPT | Mod: CPTII,S$GLB,, | Performed by: INTERNAL MEDICINE

## 2025-06-24 PROCEDURE — 3060F POS MICROALBUMINURIA REV: CPT | Mod: CPTII,S$GLB,, | Performed by: INTERNAL MEDICINE

## 2025-06-24 RX ORDER — OXYCODONE AND ACETAMINOPHEN 10; 325 MG/1; MG/1
1 TABLET ORAL EVERY 6 HOURS PRN
Qty: 120 TABLET | Refills: 0 | Status: SHIPPED | OUTPATIENT
Start: 2025-06-24

## 2025-06-24 NOTE — PROGRESS NOTES
PATIENT: Rhett Johnson  MRN: [id number]  DATE: 6/24/2025    Diagnosis:   1. Malignant neoplasm of overlapping sites of bladder    2. Cancer of overlapping sites of bladder    3. Transitional cell carcinoma with high risk of metastasis    4. Secondary malignant neoplasm of bone    5. H/O total cystectomy    6. Chronic kidney disease, stage 3a    7. Nicotine dependence, cigarettes, w unsp disorders    8. History of prostate cancer      Chief Complaint: Bladder Cancer      Oncologic History:      Oncologic History Bladder cancer - stage IV      Oncologic Treatment TURBT (7/30/24)      Pathology 6/19/25:  BONE, PUBIS LESION, CT-GUIDED BIOPSY:   Metastatic urothelial carcinoma.     Comment:  The biopsy reveals metastatic poorly differentiated carcinoma involving bone.  Tumor cells are positive with HMWK, GATA3, CK7 and P63 (focal).  The cells are negative with PSA.  Controls are adequate.  Overall the findings are consistent with metastasis from this patient's known invasive urothelial carcinoma. MMR studies are performed on a previous biopsy sample ([facility]-).  This sample is insufficient for NGS studies given that decalcified solution was used to process tissue.  However, representative lesional tissue from the previous cystectomy specimen ([id number]) will be used to attempt [facility] NGS and PD-L1 studies.     9/16/24:  1. RIGHT PELVIC LYMPH NODES, DISSECTION:  - Two lymph nodes, negative for carcinoma (0/2).  - The specimen is submitted entirely for histologic examination.  - See CAP synoptic report below.    2. LEFT PELVIC LYMPH NODES, DISSECTION:  - Benign fibrovascular tissue.  - No lymphoid tissue present.  - The specimen is submitted entirely for histologic examination.  - See CAP synoptic report below.    3. RIGHT DISTAL URETER, EXCISION:  - Negative for malignancy.  - Benign urothelial tissue.  - The specimen is submitted entirely for histologic examination.  - See CAP synoptic report below.    4. LEFT  "DISTAL URETER, EXCISION:  - Negative for malignancy.  - Benign urothelial tissue.  - The specimen is submitted entirely for histologic examination.  - See CAP synoptic report below.    5. BLADDER, RADICAL CYSTECTOMY:  - Invasive high grade urothelial carcinoma, 1.4 cm.  - The carcinoma invades perivesical soft tissue macroscopically.  - Prior procedure site changes.  - See CAP synoptic report below.    Case Summary (Bladder)    Specimen: Bladder.  Procedure: Radical cystectomy.  Tumor site: Right lateral/posterior.    Tumor size: 1.4 cm  Histologic type: Urothelial carcinoma, invasive.  Histologic grade: High grade.    Tumor extent: Invades perivesical soft tissue macroscopically.    Margins: Uninvolved by invasive carcinoma, carcinoma in situ and noninvasive papillary urothelial carcinoma.    Lymphovascular invasion: Not identified.    Treatment effect: No known presurgical neoadjuvant therapy.    Regional lymph nodes:     Number of lymph nodes involved: 0.     Number of lymph nodes examined: 2.    Distant sites: Not applicable.    pTNM classification (AJCC 8th Edition): pT3b pN0.     7/30/24:  Urinary bladder, transurethral resection of bladder tumor (TURBT):   - High-grade papillary urothelial carcinoma   - Muscularis propria is present and is involved by carcinoma   - Lymphovascular and perineural invasion are identified   - See synoptic report below   - Mismatch repair protein testing is pending and results will be reported in a supplemental report         Subjective:    History of Present Illness: Mr. Johnson is a 74 y.o. male who presented in August 2024 for evaluation and management of bladder cancer. He was referred by Dr. Griffin.    - he underwent transurethral resection of bladder tumor on 7/30/24. Pathology revealed "high-grade papillary urothelial carcinoma." Muscularis propria is present and involved. Lymphovascular and perineural invasion were identified.  - he underwent CT scan on 8/6/24.    - he " underwent cystectomy on 8/26/24. Pathology revealed a pT3,pN0 urothelial cancer.  - he underwent CT abdomen/pelvis and bone scan on 5/15/25 and 5/16/25, respectively.  - wife states he had a back surgery in April 2025. Wife reports that he had an opening near end of his incision. She feels the things seen on the CT scan/bone scan are related to that, not metastatic cancer.      Interval history:  - he presents for a follow-up appointment for his bladder cancer.  - he underwent biopsy of pubis lesion on 6/19/25. Pathology revealed metastatic urothelial carcinoma.  - he endorses severe right leg pain, dyspnea upon exertion  - he endorses fatigue, weakness, right leg pain, dyspnea upon exertion.        Past medical, surgical, family, and social histories have been reviewed and updated below.    Past Medical History:   Past Medical History:   Diagnosis Date    Anemia 09/09/2024    Asthma     Cancer     Carotid occlusion, left     100% blockage    COPD (chronic obstructive pulmonary disease)     Current tobacco use 08/20/2018    Deep vein thrombosis     Disorder of kidney and ureter     DVT (deep venous thrombosis)     Elevated WBC count 09/10/2024    Gross hematuria 07/30/2024    Hx of hepatitis     Hyperlipidemia     Hypertension     on medication monitoring    Hypertensive heart and chronic kidney disease with heart failure and stage 1 through stage 4 chronic kidney disease, or unspecified chronic kidney disease 08/29/2024    Documented on 01/03/2023      Peripheral artery disease     Prostate cancer 2001    Dr. Bai     PVCs (premature ventricular contractions) 09/19/2024    Previously symptomatic, now resolved on coreg      Urinary tract infection, site not specified        Past Surgical History:   Past Surgical History:   Procedure Laterality Date    ADENOIDECTOMY      APPENDECTOMY      Arthroscopic left knee Left     BARBOTAGE OF BLADDER N/A 7/30/2024    Procedure: BARBOTAGE, BLADDER;  Surgeon: Fabian Bai,  MD;  Location: [facility] OR;  Service: Urology;  Laterality: N/A;    BARBOTAGE OF URETER Bilateral 7/30/2024    Procedure: BARBOTAGE, URETER;  Surgeon: Fabian Bai MD;  Location: [facility] OR;  Service: Urology;  Laterality: Bilateral;    CARDIAC CATHETERIZATION      no stents    COLONOSCOPY      CREATION, ILEAL CONDUIT N/A 9/16/2024    Procedure: CREATION, ILEAL CONDUIT;  Surgeon: Hari Salguero MD;  Location: [facility] OR [address] [place] [address];  Service: Urology;  Laterality: N/A;    CYSTOSCOPY W/ RETROGRADES Bilateral 7/30/2024    Procedure: CYSTOSCOPY, WITH RETROGRADE PYELOGRAM;  Surgeon: Fabian Bai MD;  Location: [facility] OR;  Service: Urology;  Laterality: Bilateral;    CYSTOSCOPY W/ URETERAL STENT PLACEMENT Left 7/30/2024    Procedure: CYSTOSCOPY, WITH URETERAL STENT INSERTION;  Surgeon: Fabian Bai MD;  Location: [facility] OR;  Service: Urology;  Laterality: Left;    DILATION OF URETER Left 7/30/2024    Procedure: DILATION, URETER;  Surgeon: Fabian Bai MD;  Location: [facility] OR;  Service: Urology;  Laterality: Left;    DILATION OF URETHRA N/A 7/30/2024    Procedure: DILATION, URETHRA;  Surgeon: Fabian Bai MD;  Location: [facility] OR;  Service: Urology;  Laterality: N/A;    INSERTION OF MULTIFOCAL INTRAOCULAR LENS Left 12/12/2018    Procedure: INSERTION, IOL, MULTIFOCAL;  Surgeon: Eleanor Seaman MD;  Location: [facility] OR;  Service: Ophthalmology;  Laterality: Left;    INSERTION OF MULTIFOCAL INTRAOCULAR LENS Right 4/3/2019    Procedure: INSERTION, IOL, MULTIFOCAL;  Surgeon: Eleanor Seaman MD;  Location: [facility] OR;  Service: Ophthalmology;  Laterality: Right;    NECK SURGERY  2002    PHACOEMULSIFICATION OF CATARACT Left 12/12/2018    Procedure: PHACOEMULSIFICATION, CATARACT;  Surgeon: Eleanor Seaman MD;  Location: [facility] OR;  Service: Ophthalmology;  Laterality: Left;    PHACOEMULSIFICATION OF CATARACT Right 4/3/2019    Procedure: PHACOEMULSIFICATION, CATARACT;  Surgeon: Eleanor Seaman MD;  Location: [facility] OR;  Service:  Ophthalmology;  Laterality: Right;    PROSTATE SURGERY  2001    Radical prostectemy     RADICAL CYSTECTOMY N/A 9/16/2024    Procedure: CYSTECTOMY, RADICAL;  Surgeon: Hari Salguero MD;  Location: [address];  Service: Urology;  Laterality: N/A;    SPINE SURGERY  2002    Fusion of c4,c5,c6    TONSILLECTOMY      TURBT (TRANSURETHRAL RESECTION OF BLADDER TUMOR) N/A 7/30/2024    Procedure: TURBT (TRANSURETHRAL RESECTION OF BLADDER TUMOR);  Surgeon: Fabian Bai MD;  Location: [facility] OR;  Service: Urology;  Laterality: N/A;    URETEROSCOPY Bilateral 7/30/2024    Procedure: URETEROSCOPY;  Surgeon: Fabian Bai MD;  Location: [facility];  Service: Urology;  Laterality: Bilateral;       Family History:   Family History   Problem Relation Name Age of Onset    Alzheimer's disease Mother      Diabetes Father      Diabetes Sister      Cervical cancer Sister      Cancer Sister      Diabetes Brother      Hypertension Brother      Heart disease Brother          CABG    Obesity Brother      COPD Brother      Scoliosis Daughter Sue     No Known Problems Son Fish     No Known Problems Daughter Stephanie        Social History:  reports that he has quit smoking. His smoking use included cigarettes. He has a 51 pack-year smoking history. He has never used smokeless tobacco. He reports that he does not currently use alcohol. He reports that he does not use drugs.    Allergies:  Review of patient's allergies indicates:   Allergen Reactions    Pcn [penicillins] Anaphylaxis       Medications:  Current Outpatient Medications   Medication Sig Dispense Refill    acetaminophen (TYLENOL) 80 MG Chew Take by mouth.      albuterol (PROVENTIL/VENTOLIN HFA) 90 mcg/actuation inhaler Inhale 2 puffs into the lungs every 6 (six) hours as needed for Wheezing. Rescue 18 g 11    ascorbic acid, vitamin C, (VITAMIN C) 100 MG tablet Take 100 mg by mouth.      aspirin (ECOTRIN) 81 MG EC tablet Take 81 mg by mouth once daily.      carvediloL  (COREG) 12.5 MG tablet       cholecalciferol, vitamin D3, 10 mcg (400 unit) Cap capsule Take 1 tablet by mouth.      cyclobenzaprine (FLEXERIL) 10 MG tablet Take 1 tablet (10 mg total) by mouth 3 (three) times daily as needed for Muscle spasms. 180 tablet 0    furosemide (LASIX) 40 MG tablet daily as needed.      gabapentin (NEURONTIN) 600 MG tablet Take 1 tablet (600 mg total) by mouth 3 (three) times daily. 270 tablet 0    magnesium 250 mg Tab Take 1 tablet by mouth 2 (two) times daily.      multivitamin (THERAGRAN) per tablet Take 1 tablet by mouth once daily.      rosuvastatin (CRESTOR) 40 MG Tab Take 40 mg by mouth.      tiotropium (SPIRIVA) 18 mcg inhalation capsule Inhale 18 mcg into the lungs every 6 (six) hours as needed.      umeclidinium (INCRUSE ELLIPTA) 62.5 mcg/actuation inhalation capsule Inhale 62.5 mcg into the lungs once daily. Controller 90 capsule 0    zinc gluconate 50 mg tablet Take 50 mg by mouth. 2 tablets per day       No current facility-administered medications for this visit.       Review of Systems   Constitutional:  Positive for appetite change and fatigue.   HENT:  Negative for sore throat.    Eyes:  Negative for visual disturbance.   Respiratory:  Positive for shortness of breath.    Cardiovascular:  Negative for chest pain.   Gastrointestinal:  Negative for abdominal pain.   Genitourinary:  Negative for dysuria.   Musculoskeletal:  Negative for back pain.        Leg pain   Skin:  Negative for rash.   Neurological:  Positive for weakness. Negative for headaches.   Hematological:  Negative for adenopathy.   Psychiatric/Behavioral:  The patient is not nervous/anxious.        ECOG Performance Status:   ECOG SCORE    1 - Restricted in strenuous activity-ambulatory and able to carry out work of a light nature         Objective:      Vitals:   Vitals:    06/24/25 1358   BP: 124/60   BP Location: Left arm   Patient Position: Sitting   Pulse: 77   Resp: 18   Temp: 97.7 °F (36.5 °C)   TempSrc:  "Oral   SpO2: 100%   Height: 5' 10" (1.778 m)       BMI: Body mass index is 21.52 kg/m².    Physical Exam  Vitals and nursing note reviewed.   Constitutional:       Appearance: He is well-developed.      Comments: Fatigued, in wheelchair   HENT:      Head: Normocephalic and atraumatic.   Eyes:      Pupils: Pupils are equal, round, and reactive to light.   Cardiovascular:      Rate and Rhythm: Normal rate and regular rhythm.   Pulmonary:      Effort: Pulmonary effort is normal.      Breath sounds: Normal breath sounds.   Abdominal:      General: Bowel sounds are normal.      Palpations: Abdomen is soft.   Musculoskeletal:         General: Normal range of motion.      Cervical back: Normal range of motion and neck supple.   Skin:     General: Skin is warm and dry.   Neurological:      Mental Status: He is alert and oriented to person, place, and time.   Psychiatric:         Behavior: Behavior normal.         Thought Content: Thought content normal.         Judgment: Judgment normal.         Laboratory Data:  Labs have been reviewed.    Lab Results   Component Value Date    WBC 10.96 05/16/2025    HGB 9.2 (L) 05/16/2025    HCT 29.4 (L) 05/16/2025    MCV 92 05/16/2025     05/16/2025           Imaging:    NM bone scan (5/16/25):  Abnormal examination with development of prominent uptake within the pubic bones bilaterally greater on the right than on the left when compared to prior study dated 08/14/2014. The increased tracer uptake correlates with bony changes within the pubic bones bilaterally. Findings may be related to lytic metastatic disease, however the findings are nonspecific and osteomyelitis would also be a consideration.     CT abdomen/pelvis (5/15/25): I have personally reviewed the images  Interval development of bone destruction involving the superior and inferior pubic rami on the right when compared to the prior examination.  Finding may represent osseous metastatic disease and less likely " osteolysis due to osteomyelitis.     Status post cystectomy with ileal conduit.  Bilateral hydronephrosis is present with stranding of the perinephric fat particularly on the left.  The perinephric fat stranding is nonspecific and may be related to the hydronephrosis, however pyelonephritis cannot be excluded.     Surgical changes of spinal fusion extending from L2-S1.  No evidence for hardware failure.     Prominent atherosclerosis.  Prominent atherosclerotic calcification at the origin of the superior mesenteric artery.  Right common iliac and common femoral arterial stents are present as well as bilateral superficial femoral arterial stents.     Fat containing umbilical hernia.        CT chest/abdomen/pelvis (8/6/24): I have personally reviewed the images  1. Persistent posterior left lateral bladder wall thickening possibly slightly more prominent but likely related to bladder nondistention.  Left ureteral stent now present.  There is a small amount of air within the anterior bladder with tear felt to extend outside the bladder within the previously noted urachal remanent.  Air felt to be related to recent instrumentation.  Correlate clinically.  2. Extensive emphysematous findings with bilateral scarring.  Multiple small lingular pulmonary nodules present which may be stable and comparison with outside old chest CTs recommended.  Small irregular nodules within the inferior right upper lobe may represent nodular scarring.  Again comparison with older studies likely beneficial.  Attention on follow-up exams recommended to exclude malignancy/metastatic disease.  Partially calcified irregular opacity within the medial left upper lobe may be sequela of prior infectious or inflammatory etiology.  3. Small layering right pleural effusion.  4. Circumferential thickening of distal esophagus which may relate to esophagitis.  Correlate with EGD.  5. Diffuse atherosclerotic plaquing including prominent LAD coronary  "calcification.      Assessment:       1. Malignant neoplasm of overlapping sites of bladder    2. Cancer of overlapping sites of bladder    3. Transitional cell carcinoma with high risk of metastasis    4. Secondary malignant neoplasm of bone    5. H/O total cystectomy    6. Chronic kidney disease, stage 3a    7. Nicotine dependence, cigarettes, w unsp disorders    8. History of prostate cancer             Plan:     Bladder cancer - stage 4 / history of cystectomy / secondary malignant neoplasm of bone  - I have reviewed his chart  - he underwent transurethral resection of bladder tumor on 7/30/24. Pathology revealed "high-grade papillary urothelial carcinoma." Muscularis propria is present and involved. Lymphovascular and perineural invasion were identified.  - CT scan (8/6/24) revealed "persistent posterior left lateral bladder wall thickening possibly slightly more prominent but likely related to bladder nondistention.  Left ureteral stent now present."  - due to his chronic kidney disease, I do not feel he is a candidate for neoadjuvant cisplatin-based chemotherapy.  - he underwent cystectomy on 8/26/24. Pathology revealed a pT3,pN0 urothelial cancer.  - CT abdomen/pelvis (5/15/25) and bone scan (5/16/25) are concerning for metastatic disease  - wife states he had a back surgery in April 2025. Wife reports that he had an opening near end of his incision. She feels the things seen on the CT scan/bone scan are related to that, not metastatic cancer.  - he underwent biopsy of pubis lesion on 6/19/25. Pathology revealed metastatic urothelial carcinoma.   - I think we should get a pet scan for complete staging  - refer to radiation oncology for palliative radiation to pubic metastatic lesion.  - he is concerned about systemic therapy. I said we should get the pet scan and radiation therapy, then make a final decision about systemic therapy. If anything, I would recommend pembrolizumab +/- enfortumab [id number]   - " return to clinic in 4 weeks.    2. Chronic kidney disease stage 3a  - eGFR ranges between 37 - 53 ml/min/m2  - continue to monitor    3. History of smoking  - he quit 4 years ago  - continue to monitor    4. History of prostate cancer  - he underwent prostatectomy 20 years ago.  - last PSA was undetectable    5. Chronic neoplasm-related pain  - related to pubic metastasis  - refer to radiation oncology for palliative radiation to pubic metastatic lesion.  - I will send in percocet 10mg tablets    - return to clinic in 4 weeks.    Visit today included increased complexity associated with the care of the episodic problem neoplasm-related pain addressed and managing the longitudinal care of the patient due to the serious and/or complex managed problem(s) bladder cancer.      Marcin Melchor M.D.  Hematology/Oncology  [facility] - [address], [address]  [place] [address]  Phone: [phone]  Fax: [phone]

## 2025-06-24 NOTE — TELEPHONE ENCOUNTER
Copied from CRM #[id number]. Topic: Medications - Pharmacy  >> Jun 24, 2025  2:55 PM Lucia wrote:  Type:  Pharmacy Calling to Clarify an RX      Pharmacy Name:[facility]  Prescription Name:oxyCODONE-acetaminophen (PERCOCET)  mg per tablet   What do they need to clarify?:condition dr is treating pt for  Best Call Back Number:    Additional Information:

## 2025-07-08 ENCOUNTER — HOSPITAL ENCOUNTER (OUTPATIENT)
Dept: RADIOLOGY | Facility: HOSPITAL | Age: 75
Discharge: HOME | End: 2025-07-08
Attending: INTERNAL MEDICINE
Payer: MEDICARE

## 2025-07-08 DIAGNOSIS — C67.8 MALIGNANT NEOPLASM OF OVERLAPPING SITES OF BLADDER: ICD-10-CM

## 2025-07-08 DIAGNOSIS — C79.51 SECONDARY MALIGNANT NEOPLASM OF BONE: ICD-10-CM

## 2025-07-08 DIAGNOSIS — C61 MALIGNANT NEOPLASM OF PROSTATE: Primary | ICD-10-CM

## 2025-07-08 DIAGNOSIS — C68.9 UROTHELIAL CARCINOMA: ICD-10-CM

## 2025-07-08 LAB — POCT GLUCOSE: 102 MG/DL (ref 70–110)

## 2025-07-08 PROCEDURE — 78815 PET IMAGE W/CT SKULL-THIGH: CPT | Mod: 26,PS,, | Performed by: NUCLEAR MEDICINE

## 2025-07-08 PROCEDURE — 78815 PET IMAGE W/CT SKULL-THIGH: CPT | Mod: TC

## 2025-07-08 PROCEDURE — A9552 F18 FDG: HCPCS | Performed by: INTERNAL MEDICINE

## 2025-07-08 RX ORDER — FLUDEOXYGLUCOSE F18 500 MCI/ML
14.1 INJECTION INTRAVENOUS
Status: COMPLETED | OUTPATIENT
Start: 2025-07-08 | End: 2025-07-08

## 2025-07-08 RX ADMIN — FLUDEOXYGLUCOSE F-18 14.1 MILLICURIE: 500 INJECTION INTRAVENOUS at 10:07

## 2025-07-10 ENCOUNTER — PATIENT MESSAGE (OUTPATIENT)
Dept: HEMATOLOGY/ONCOLOGY | Facility: CLINIC | Age: 75
End: 2025-07-10
Payer: MEDICARE

## 2025-07-10 ENCOUNTER — OFFICE VISIT (OUTPATIENT)
Dept: RADIATION ONCOLOGY | Facility: CLINIC | Age: 75
End: 2025-07-10
Payer: MEDICARE

## 2025-07-10 VITALS
HEART RATE: 79 BPM | RESPIRATION RATE: 14 BRPM | WEIGHT: 168.19 LBS | TEMPERATURE: 98 F | HEIGHT: 71 IN | BODY MASS INDEX: 23.55 KG/M2 | SYSTOLIC BLOOD PRESSURE: 131 MMHG | DIASTOLIC BLOOD PRESSURE: 68 MMHG | OXYGEN SATURATION: 98 %

## 2025-07-10 DIAGNOSIS — G89.3 CHRONIC NEOPLASM-RELATED PAIN: ICD-10-CM

## 2025-07-10 DIAGNOSIS — C79.51 SECONDARY MALIGNANT NEOPLASM OF BONE: ICD-10-CM

## 2025-07-10 PROCEDURE — 99999 PR PBB SHADOW E&M-EST. PATIENT-LVL V: CPT | Mod: PBBFAC,,, | Performed by: STUDENT IN AN ORGANIZED HEALTH CARE EDUCATION/TRAINING PROGRAM

## 2025-07-10 NOTE — PROGRESS NOTES
[facility] - Radiation Oncology Consult Note        Date of Service: 07/10/2025     Chief Complaint: bladder cancer     Reason for visit: consideration for radiation to the pelvis     Referring Physician: Dr Melchor (medical Oncology)     Implantable devices:  Denies     Therapy to Date:  No radiation        Diagnosis/Assessment:   Rhett Johnson is a 74 y.o. 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 Salguero 09/16/2024 with pT3b pN0cN0 invasive high-grade urothelial carcinoma, 1.4 cm, right lateral/posterior, SM-, 0/2 right pelvic lymph nodes involved, no left pelvic lymph nodes sampled, with ileal conduit.  On surveillance CT abdomen pelvis and bone scan 05/15/2025, there was concern for metastatic disease in bilateral superior and inferior pubic ramus right> Left.  On PET scan 07/08/2025, they were diffuse hypermetabolic lytic changes correlating with the CT abdomen pelvis    PMH of prostate cancer , s/p radical retropubic prostatectomy in 2001. PSA <0.01 , CKD 3b, COPD, PAD (7 stents), malnutrition, DVT,  7 year history of syncopal episodes with falls.      ECOG 2   Patient has pain in the right hip and right groin that is somewhat controlled by Percocet Flexeril and gabapentin  Plan   We discussed palliative radiation to the pubic bones involved by metastatic bladder cancer.    The patient is declining radiation at this time.  He would like to rediscuss systemic therapy with Dr. Melchor  I just sent a message to Dr. Melchor  Return to radiation oncology as needed       HPI:   Rhett Johnson is a 74 y.o. 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 Salguero 09/16/2024 with pT3b pN0cN0 invasive high-grade urothelial carcinoma.    Oncologic history:  09/16/2024 radical cystectomy with Dr. Salguero   pT3b pN0 invasive high-grade urothelial carcinoma, 1.4 cm, right lateral/posterior, SM-, 0/2 right pelvic lymph nodes involved, no left pelvic lymph nodes sampled    06/19/2025 CT-guided biopsy of the pubic bone   Metastatic urothelial carcinoma    07/08/2025 PET-CT FDG   3.5 cm Hypermetabolic nodular soft tissue density in left pelvic sidewall within the operative bed SUV max 5.9   Diffuse hypermetabolic lytic changes in right superior and inferior pubis, and anterior left superior and inferior pubis SUV max 9.7 - 12          Subjective:   In clinic the patient is accompanied by his wife.    He reports right-sided leg pain that radiates to the right knee.  He also reports pain at the level of his groin and pubic symphysis.  His pain is relieved with Percocet, Flexeril and gabapentin.   Otherwise he reports fatigue back pain numbness/tingling and focal weakness.    He denies other major issues such as chest pain cough hemoptysis nausea vomiting abdominal pain diarrhea constipation blood in stool or blood in his urine    He denies other major issues     The patient denies any history of radiation therapy, implantable cardiac devices, or connective tissue disease.     Social history  The patient is  to his wife Rhina and they live in [place], [place]     Social History[1]    Family History   Problem Relation Name Age of Onset    Alzheimer's disease Mother      Diabetes Father      Diabetes Sister      Cervical cancer Sister      Cancer Sister      Diabetes Brother      Hypertension Brother      Heart disease Brother          CABG    Obesity Brother      COPD Brother      Scoliosis Daughter Sue     No Known Problems Son Fish     No Known Problems Daughter Stephanie      Medications Ordered Prior  to Encounter[2]  Review of patient's allergies indicates:   Allergen Reactions    Pcn [penicillins] Anaphylaxis     Past Surgical History:   Procedure Laterality Date    ADENOIDECTOMY      APPENDECTOMY      Arthroscopic left knee Left     BARBOTAGE OF BLADDER N/A 7/30/2024    Procedure: BARBOTAGE, BLADDER;  Surgeon: Fabian Bai MD;  Location: [facility];  Service: Urology;  Laterality: N/A;    BARBOTAGE OF URETER Bilateral 7/30/2024    Procedure: BARBOTAGE, URETER;  Surgeon: Fabian Bai MD;  Location: [facility];  Service: Urology;  Laterality: Bilateral;    CARDIAC CATHETERIZATION      no stents    COLONOSCOPY      CREATION, ILEAL CONDUIT N/A 9/16/2024    Procedure: CREATION, ILEAL CONDUIT;  Surgeon: Hari Salguero MD;  Location: [address];  Service: Urology;  Laterality: N/A;    CYSTOSCOPY W/ RETROGRADES Bilateral 7/30/2024    Procedure: CYSTOSCOPY, WITH RETROGRADE PYELOGRAM;  Surgeon: Fabian Bai MD;  Location: [facility];  Service: Urology;  Laterality: Bilateral;    CYSTOSCOPY W/ URETERAL STENT PLACEMENT Left 7/30/2024    Procedure: CYSTOSCOPY, WITH URETERAL STENT INSERTION;  Surgeon: Fabian Bai MD;  Location: [facility] OR;  Service: Urology;  Laterality: Left;    DILATION OF URETER Left 7/30/2024    Procedure: DILATION, URETER;  Surgeon: Fabian Bai MD;  Location: [facility];  Service: Urology;  Laterality: Left;    DILATION OF URETHRA N/A 7/30/2024    Procedure: DILATION, URETHRA;  Surgeon: Fabian Bai MD;  Location: [facility] OR;  Service: Urology;  Laterality: N/A;    INSERTION OF MULTIFOCAL INTRAOCULAR LENS Left 12/12/2018    Procedure: INSERTION, IOL, MULTIFOCAL;  Surgeon: Eleanor Seaman MD;  Location: [facility] OR;  Service: Ophthalmology;  Laterality: Left;    INSERTION OF MULTIFOCAL INTRAOCULAR LENS Right 4/3/2019    Procedure: INSERTION, IOL, MULTIFOCAL;  Surgeon: Eleanor Seaman MD;  Location: [facility];  Service: Ophthalmology;  Laterality: Right;    NECK SURGERY  2002     PHACOEMULSIFICATION OF CATARACT Left 12/12/2018    Procedure: PHACOEMULSIFICATION, CATARACT;  Surgeon: Eleaonr Seaman MD;  Location: [facility] OR;  Service: Ophthalmology;  Laterality: Left;    PHACOEMULSIFICATION OF CATARACT Right 4/3/2019    Procedure: PHACOEMULSIFICATION, CATARACT;  Surgeon: Eleanor Seaman MD;  Location: [facility] OR;  Service: Ophthalmology;  Laterality: Right;    PROSTATE SURGERY  2001    Radical prostectemy     RADICAL CYSTECTOMY N/A 9/16/2024    Procedure: CYSTECTOMY, RADICAL;  Surgeon: Hari Salguero MD;  [address];  Service: Urology;  Laterality: N/A;    SPINE SURGERY  2002    Fusion of c4,c5,c6    TONSILLECTOMY      TURBT (TRANSURETHRAL RESECTION OF BLADDER TUMOR) N/A 7/30/2024    Procedure: TURBT (TRANSURETHRAL RESECTION OF BLADDER TUMOR);  Surgeon: Fabian Bai MD;  Location: [facility] OR;  Service: Urology;  Laterality: N/A;    URETEROSCOPY Bilateral 7/30/2024    Procedure: URETEROSCOPY;  Surgeon: Fabian Bai MD;  Location: [facility] OR;  Service: Urology;  Laterality: Bilateral;     Past Medical History:   Diagnosis Date    Anemia 09/09/2024    Asthma     Cancer     Carotid occlusion, left     100% blockage    COPD (chronic obstructive pulmonary disease)     Current tobacco use 08/20/2018    Deep vein thrombosis     Disorder of kidney and ureter     DVT (deep venous thrombosis)     Elevated WBC count 09/10/2024    Gross hematuria 07/30/2024    Hx of hepatitis     Hyperlipidemia     Hypertension     on medication monitoring    Hypertensive heart and chronic kidney disease with heart failure and stage 1 through stage 4 chronic kidney disease, or unspecified chronic kidney disease 08/29/2024    Documented on 01/03/2023      Peripheral artery disease     Prostate cancer 2001    Dr. Bai     PVCs (premature ventricular contractions) 09/19/2024    Previously symptomatic, now resolved on coreg      Urinary tract infection, site not specified              Review of Systems    Negative unless as above       Objective:   Physical Exam  Vitals reviewed.   Constitutional:       Appearance: Normal appearance.   HENT:      Head: Normocephalic and atraumatic.   Pulmonary:      Effort: Pulmonary effort is normal.   Abdominal:      General: There is no distension.   Musculoskeletal:         General: Normal range of motion.   Neurological:      General: No focal deficit present.      Mental Status: alert and oriented  Psychiatric:         Mood and Affect: Mood normal.         Behavior: Behavior normal.      Imaging: I have personally reviewed the patient's available images and reports and summarized pertinent findings above in HPI.      Pathology: I have personally reviewed the patient's available pathology and summarized pertinent findings above in HPI.        I spent approximately 60 minutes reviewing the available records and evaluating the patient, out of which over 50% of the time was spent face to face with the patient in counseling and coordinating this patient's care.     Thank you for the opportunity to care for this patient. Please do not hesitate to contact me with any questions.     Trung Cook MD/PhD                           [1]   Social History  Tobacco Use    Smoking status: Former     Current packs/day: 1.00     Average packs/day: 1 pack/day for 51.0 years (51.0 ttl pk-yrs)     Types: Cigarettes    Smokeless tobacco: Never    Tobacco comments:     Smoked about 50-55 years about 1 pack per day.  Quit October 20, 2020.     Patient aware of smoking cessation program and aware of health risk due to smoking.    Substance Use Topics    Alcohol use: Not Currently    Drug use: Never   [2]   Current Outpatient Medications on File Prior to Visit   Medication Sig Dispense Refill    acetaminophen (TYLENOL) 80 MG Chew Take by mouth.      albuterol (PROVENTIL/VENTOLIN HFA) 90 mcg/actuation inhaler Inhale 2 puffs into the lungs every 6 (six) hours as needed for Wheezing. Rescue 18 g 11     ascorbic acid, vitamin C, (VITAMIN C) 100 MG tablet Take 100 mg by mouth.      aspirin (ECOTRIN) 81 MG EC tablet Take 81 mg by mouth once daily.      carvediloL (COREG) 12.5 MG tablet       cholecalciferol, vitamin D3, 10 mcg (400 unit) Cap capsule Take 1 tablet by mouth.      cyclobenzaprine (FLEXERIL) 10 MG tablet Take 1 tablet (10 mg total) by mouth 3 (three) times daily as needed for Muscle spasms. 180 tablet 0    furosemide (LASIX) 40 MG tablet daily as needed.      gabapentin (NEURONTIN) 600 MG tablet Take 1 tablet (600 mg total) by mouth 3 (three) times daily. 270 tablet 0    magnesium 250 mg Tab Take 1 tablet by mouth 2 (two) times daily.      multivitamin (THERAGRAN) per tablet Take 1 tablet by mouth once daily.      oxyCODONE-acetaminophen (PERCOCET)  mg per tablet Take 1 tablet by mouth every 6 (six) hours as needed for Pain (chronic neoplasm-related pain). 120 tablet 0    rosuvastatin (CRESTOR) 40 MG Tab Take 40 mg by mouth.      tiotropium (SPIRIVA) 18 mcg inhalation capsule Inhale 18 mcg into the lungs every 6 (six) hours as needed.      umeclidinium (INCRUSE ELLIPTA) 62.5 mcg/actuation inhalation capsule Inhale 62.5 mcg into the lungs once daily. Controller 90 capsule 0    zinc gluconate 50 mg tablet Take 50 mg by mouth. 2 tablets per day       No current facility-administered medications on file prior to visit.

## 2025-07-13 LAB
TEMPUS PD-L1 (22C3) COMBINED POSITIVE SCORE: 1
TEMPUS PD-L1 (22C3) TUMOR PROPORTION SCORE: <1 %
TEMPUS PORTAL: NORMAL

## 2025-07-15 ENCOUNTER — OFFICE VISIT (OUTPATIENT)
Dept: HEMATOLOGY/ONCOLOGY | Facility: CLINIC | Age: 75
End: 2025-07-15
Payer: MEDICARE

## 2025-07-15 VITALS
DIASTOLIC BLOOD PRESSURE: 53 MMHG | SYSTOLIC BLOOD PRESSURE: 107 MMHG | TEMPERATURE: 98 F | HEART RATE: 77 BPM | BODY MASS INDEX: 22.54 KG/M2 | WEIGHT: 157.44 LBS | HEIGHT: 70 IN | RESPIRATION RATE: 16 BRPM | OXYGEN SATURATION: 97 %

## 2025-07-15 DIAGNOSIS — F17.219 NICOTINE DEPENDENCE, CIGARETTES, W UNSP DISORDERS: ICD-10-CM

## 2025-07-15 DIAGNOSIS — R11.2 CHEMOTHERAPY-INDUCED NAUSEA AND VOMITING: ICD-10-CM

## 2025-07-15 DIAGNOSIS — E03.2 DRUG-INDUCED HYPOTHYROIDISM: ICD-10-CM

## 2025-07-15 DIAGNOSIS — Z85.46 HISTORY OF PROSTATE CANCER: ICD-10-CM

## 2025-07-15 DIAGNOSIS — C79.51 SECONDARY MALIGNANT NEOPLASM OF BONE: ICD-10-CM

## 2025-07-15 DIAGNOSIS — T45.1X5A CHEMOTHERAPY-INDUCED NAUSEA AND VOMITING: ICD-10-CM

## 2025-07-15 DIAGNOSIS — C67.8 MALIGNANT NEOPLASM OF OVERLAPPING SITES OF BLADDER: Primary | ICD-10-CM

## 2025-07-15 DIAGNOSIS — G89.3 CHRONIC NEOPLASM-RELATED PAIN: ICD-10-CM

## 2025-07-15 DIAGNOSIS — Z90.6 H/O TOTAL CYSTECTOMY: ICD-10-CM

## 2025-07-15 DIAGNOSIS — N18.31 CHRONIC KIDNEY DISEASE, STAGE 3A: ICD-10-CM

## 2025-07-15 PROCEDURE — 3008F BODY MASS INDEX DOCD: CPT | Mod: CPTII,S$GLB,, | Performed by: INTERNAL MEDICINE

## 2025-07-15 PROCEDURE — 99215 OFFICE O/P EST HI 40 MIN: CPT | Mod: S$GLB,,, | Performed by: INTERNAL MEDICINE

## 2025-07-15 PROCEDURE — 3078F DIAST BP <80 MM HG: CPT | Mod: CPTII,S$GLB,, | Performed by: INTERNAL MEDICINE

## 2025-07-15 PROCEDURE — G2211 COMPLEX E/M VISIT ADD ON: HCPCS | Mod: S$GLB,,, | Performed by: INTERNAL MEDICINE

## 2025-07-15 PROCEDURE — 3074F SYST BP LT 130 MM HG: CPT | Mod: CPTII,S$GLB,, | Performed by: INTERNAL MEDICINE

## 2025-07-15 PROCEDURE — 1157F ADVNC CARE PLAN IN RCRD: CPT | Mod: CPTII,S$GLB,, | Performed by: INTERNAL MEDICINE

## 2025-07-15 PROCEDURE — 4010F ACE/ARB THERAPY RXD/TAKEN: CPT | Mod: CPTII,S$GLB,, | Performed by: INTERNAL MEDICINE

## 2025-07-15 PROCEDURE — 3288F FALL RISK ASSESSMENT DOCD: CPT | Mod: CPTII,S$GLB,, | Performed by: INTERNAL MEDICINE

## 2025-07-15 PROCEDURE — 1159F MED LIST DOCD IN RCRD: CPT | Mod: CPTII,S$GLB,, | Performed by: INTERNAL MEDICINE

## 2025-07-15 PROCEDURE — 99999 PR PBB SHADOW E&M-EST. PATIENT-LVL V: CPT | Mod: PBBFAC,,, | Performed by: INTERNAL MEDICINE

## 2025-07-15 PROCEDURE — 3066F NEPHROPATHY DOC TX: CPT | Mod: CPTII,S$GLB,, | Performed by: INTERNAL MEDICINE

## 2025-07-15 PROCEDURE — 3060F POS MICROALBUMINURIA REV: CPT | Mod: CPTII,S$GLB,, | Performed by: INTERNAL MEDICINE

## 2025-07-15 PROCEDURE — 1160F RVW MEDS BY RX/DR IN RCRD: CPT | Mod: CPTII,S$GLB,, | Performed by: INTERNAL MEDICINE

## 2025-07-15 PROCEDURE — 1101F PT FALLS ASSESS-DOCD LE1/YR: CPT | Mod: CPTII,S$GLB,, | Performed by: INTERNAL MEDICINE

## 2025-07-15 PROCEDURE — 1126F AMNT PAIN NOTED NONE PRSNT: CPT | Mod: CPTII,S$GLB,, | Performed by: INTERNAL MEDICINE

## 2025-07-15 RX ORDER — ONDANSETRON 8 MG/1
8 TABLET, ORALLY DISINTEGRATING ORAL EVERY 8 HOURS PRN
Qty: 40 TABLET | Refills: 5 | Status: SHIPPED | OUTPATIENT
Start: 2025-07-15

## 2025-07-15 RX ORDER — PROCHLORPERAZINE MALEATE 5 MG
5 TABLET ORAL EVERY 6 HOURS PRN
Qty: 30 TABLET | Refills: 1 | Status: SHIPPED | OUTPATIENT
Start: 2025-07-15 | End: 2026-07-15

## 2025-07-15 NOTE — PROGRESS NOTES
PATIENT: Rhett Johnson  MRN: [id number]  DATE: 7/15/2025    Diagnosis:   1. Malignant neoplasm of overlapping sites of bladder    2. Secondary malignant neoplasm of bone    3. H/O total cystectomy    4. Chronic kidney disease, stage 3a    5. Chronic neoplasm-related pain    6. Nicotine dependence, cigarettes, w unsp disorders    7. History of prostate cancer      Chief Complaint: Bladder Cancer    Oncologic History:      Oncologic History Bladder cancer - stage IV      Oncologic Treatment TURBT (7/30/24)      Pathology 6/19/25:  BONE, PUBIS LESION, CT-GUIDED BIOPSY:   Metastatic urothelial carcinoma.     Comment:  The biopsy reveals metastatic poorly differentiated carcinoma involving bone.  Tumor cells are positive with HMWK, GATA3, CK7 and P63 (focal).  The cells are negative with PSA.  Controls are adequate.  Overall the findings are consistent with metastasis from this patient's known invasive urothelial carcinoma. MMR studies are performed on a previous biopsy sample ([facility]-).  This sample is insufficient for NGS studies given that decalcified solution was used to process tissue.  However, representative lesional tissue from the previous cystectomy specimen ([id number] ) will be used to attempt [facility] NGS and PD-L1 studies.     9/16/24:  1. RIGHT PELVIC LYMPH NODES, DISSECTION:  - Two lymph nodes, negative for carcinoma (0/2).  - The specimen is submitted entirely for histologic examination.  - See CAP synoptic report below.    2. LEFT PELVIC LYMPH NODES, DISSECTION:  - Benign fibrovascular tissue.  - No lymphoid tissue present.  - The specimen is submitted entirely for histologic examination.  - See CAP synoptic report below.    3. RIGHT DISTAL URETER, EXCISION:  - Negative for malignancy.  - Benign urothelial tissue.  - The specimen is submitted entirely for histologic examination.  - See CAP synoptic report below.    4. LEFT DISTAL URETER, EXCISION:  - Negative for malignancy.  - Benign urothelial  "tissue.  - The specimen is submitted entirely for histologic examination.  - See CAP synoptic report below.    5. BLADDER, RADICAL CYSTECTOMY:  - Invasive high grade urothelial carcinoma, 1.4 cm.  - The carcinoma invades perivesical soft tissue macroscopically.  - Prior procedure site changes.  - See CAP synoptic report below.    Case Summary (Bladder)    Specimen: Bladder.  Procedure: Radical cystectomy.  Tumor site: Right lateral/posterior.    Tumor size: 1.4 cm  Histologic type: Urothelial carcinoma, invasive.  Histologic grade: High grade.    Tumor extent: Invades perivesical soft tissue macroscopically.    Margins: Uninvolved by invasive carcinoma, carcinoma in situ and noninvasive papillary urothelial carcinoma.    Lymphovascular invasion: Not identified.    Treatment effect: No known presurgical neoadjuvant therapy.    Regional lymph nodes:     Number of lymph nodes involved: 0.     Number of lymph nodes examined: 2.    Distant sites: Not applicable.    pTNM classification (AJCC 8th Edition): pT3b pN0.     7/30/24:  Urinary bladder, transurethral resection of bladder tumor (TURBT):   - High-grade papillary urothelial carcinoma   - Muscularis propria is present and is involved by carcinoma   - Lymphovascular and perineural invasion are identified   - See synoptic report below   - Mismatch repair protein testing is pending and results will be reported in a supplemental report         Subjective:    History of Present Illness: Mr. Johnson is a 74 y.o. male who presented in August 2024 for evaluation and management of bladder cancer. He was referred by Dr. Griffin.    - he underwent transurethral resection of bladder tumor on 7/30/24. Pathology revealed "high-grade papillary urothelial carcinoma." Muscularis propria is present and involved. Lymphovascular and perineural invasion were identified.  - he underwent CT scan on 8/6/24.    - he underwent cystectomy on 8/26/24. Pathology revealed a pT3,pN0 urothelial " cancer.  - he underwent CT abdomen/pelvis and bone scan on 5/15/25 and 5/16/25, respectively.  - wife states he had a back surgery in April 2025. Wife reports that he had an opening near end of his incision. She feels the things seen on the CT scan/bone scan are related to that, not metastatic cancer.  - he underwent biopsy of pubis lesion on 6/19/25. Pathology revealed metastatic urothelial carcinoma.      Interval history:  - he presents for a follow-up appointment for his bladder cancer.  - he underwent pet scan on 7/8/25.  - he endorses severe right leg pain, dyspnea upon exertion  - he endorses fatigue, weakness, right leg pain, dyspnea upon exertion.        Past medical, surgical, family, and social histories have been reviewed and updated below.    Past Medical History:   Past Medical History:   Diagnosis Date    Anemia 09/09/2024    Asthma     Cancer     Carotid occlusion, left     100% blockage    COPD (chronic obstructive pulmonary disease)     Current tobacco use 08/20/2018    Deep vein thrombosis     Disorder of kidney and ureter     DVT (deep venous thrombosis)     Elevated WBC count 09/10/2024    Gross hematuria 07/30/2024    Hx of hepatitis     Hyperlipidemia     Hypertension     on medication monitoring    Hypertensive heart and chronic kidney disease with heart failure and stage 1 through stage 4 chronic kidney disease, or unspecified chronic kidney disease 08/29/2024    Documented on 01/03/2023      Peripheral artery disease     Prostate cancer 2001    Dr. Bai     PVCs (premature ventricular contractions) 09/19/2024    Previously symptomatic, now resolved on coreg      Urinary tract infection, site not specified        Past Surgical History:   Past Surgical History:   Procedure Laterality Date    ADENOIDECTOMY      APPENDECTOMY      Arthroscopic left knee Left     BARBOTAGE OF BLADDER N/A 7/30/2024    Procedure: BARBOTAGE, BLADDER;  Surgeon: Fabian Bai MD;  Location: [facility] OR;  Service:  Urology;  Laterality: N/A;    BARBOTAGE OF URETER Bilateral 7/30/2024    Procedure: BARBOTAGE, URETER;  Surgeon: Fabian Bai MD;  Location: [facility] OR;  Service: Urology;  Laterality: Bilateral;    CARDIAC CATHETERIZATION      no stents    COLONOSCOPY      CREATION, ILEAL CONDUIT N/A 9/16/2024    Procedure: CREATION, ILEAL CONDUIT;  Surgeon: Hari Salguero MD;  [facility];  Service: Urology;  Laterality: N/A;    CYSTOSCOPY W/ RETROGRADES Bilateral 7/30/2024    Procedure: CYSTOSCOPY, WITH RETROGRADE PYELOGRAM;  Surgeon: Fabian Bai MD;  Location: [facility] OR;  Service: Urology;  Laterality: Bilateral;    CYSTOSCOPY W/ URETERAL STENT PLACEMENT Left 7/30/2024    Procedure: CYSTOSCOPY, WITH URETERAL STENT INSERTION;  Surgeon: Fabian Bai MD;  Location: [facility] OR;  Service: Urology;  Laterality: Left;    DILATION OF URETER Left 7/30/2024    Procedure: DILATION, URETER;  Surgeon: Fabian Bai MD;  Location: [facility] OR;  Service: Urology;  Laterality: Left;    DILATION OF URETHRA N/A 7/30/2024    Procedure: DILATION, URETHRA;  Surgeon: Fabian Bai MD;  Location: [facility] OR;  Service: Urology;  Laterality: N/A;    INSERTION OF MULTIFOCAL INTRAOCULAR LENS Left 12/12/2018    Procedure: INSERTION, IOL, MULTIFOCAL;  Surgeon: Eleanor Seaman MD;  Location: [facility] OR;  Service: Ophthalmology;  Laterality: Left;    INSERTION OF MULTIFOCAL INTRAOCULAR LENS Right 4/3/2019    Procedure: INSERTION, IOL, MULTIFOCAL;  Surgeon: Eleanor Seaman MD;  Location: [facility] OR;  Service: Ophthalmology;  Laterality: Right;    NECK SURGERY  2002    PHACOEMULSIFICATION OF CATARACT Left 12/12/2018    Procedure: PHACOEMULSIFICATION, CATARACT;  Surgeon: Eleanor Seaman MD;  Location: [facility] OR;  Service: Ophthalmology;  Laterality: Left;    PHACOEMULSIFICATION OF CATARACT Right 4/3/2019    Procedure: PHACOEMULSIFICATION, CATARACT;  Surgeon: Eleanor Seaman MD;  Location: [facility] OR;  Service: Ophthalmology;  Laterality: Right;     PROSTATE SURGERY  2001    Radical prostectemy     RADICAL CYSTECTOMY N/A 9/16/2024    Procedure: CYSTECTOMY, RADICAL;  Surgeon: Hari Salguero MD;  Location: [address];  Service: Urology;  Laterality: N/A;    SPINE SURGERY  2002    Fusion of c4,c5,c6    TONSILLECTOMY      TURBT (TRANSURETHRAL RESECTION OF BLADDER TUMOR) N/A 7/30/2024    Procedure: TURBT (TRANSURETHRAL RESECTION OF BLADDER TUMOR);  Surgeon: Fabian Bai MD;  Location: [facility] OR;  Service: Urology;  Laterality: N/A;    URETEROSCOPY Bilateral 7/30/2024    Procedure: URETEROSCOPY;  Surgeon: Fabian Bai MD;  Location: [facility];  Service: Urology;  Laterality: Bilateral;       Family History:   Family History   Problem Relation Name Age of Onset    Alzheimer's disease Mother      Diabetes Father      Diabetes Sister      Cervical cancer Sister      Cancer Sister      Diabetes Brother      Hypertension Brother      Heart disease Brother          CABG    Obesity Brother      COPD Brother      Scoliosis Daughter Sue     No Known Problems Son Fish     No Known Problems Daughter Stephanie        Social History:  reports that he has quit smoking. His smoking use included cigarettes. He has a 51 pack-year smoking history. He has never used smokeless tobacco. He reports that he does not currently use alcohol. He reports that he does not use drugs.    Allergies:  Review of patient's allergies indicates:   Allergen Reactions    Pcn [penicillins] Anaphylaxis       Medications:  Current Outpatient Medications   Medication Sig Dispense Refill    acetaminophen (TYLENOL) 80 MG Chew Take by mouth.      albuterol (PROVENTIL/VENTOLIN HFA) 90 mcg/actuation inhaler Inhale 2 puffs into the lungs every 6 (six) hours as needed for Wheezing. Rescue 18 g 11    ascorbic acid, vitamin C, (VITAMIN C) 100 MG tablet Take 100 mg by mouth.      aspirin (ECOTRIN) 81 MG EC tablet Take 81 mg by mouth once daily.      carvediloL (COREG) 12.5 MG tablet        cholecalciferol, vitamin D3, 10 mcg (400 unit) Cap capsule Take 1 tablet by mouth.      cyclobenzaprine (FLEXERIL) 10 MG tablet Take 1 tablet (10 mg total) by mouth 3 (three) times daily as needed for Muscle spasms. 180 tablet 0    furosemide (LASIX) 40 MG tablet daily as needed.      gabapentin (NEURONTIN) 600 MG tablet Take 1 tablet (600 mg total) by mouth 3 (three) times daily. 270 tablet 0    magnesium 250 mg Tab Take 1 tablet by mouth 2 (two) times daily.      multivitamin (THERAGRAN) per tablet Take 1 tablet by mouth once daily.      oxyCODONE-acetaminophen (PERCOCET)  mg per tablet Take 1 tablet by mouth every 6 (six) hours as needed for Pain (chronic neoplasm-related pain). 120 tablet 0    rosuvastatin (CRESTOR) 40 MG Tab Take 40 mg by mouth.      tiotropium (SPIRIVA) 18 mcg inhalation capsule Inhale 18 mcg into the lungs every 6 (six) hours as needed.      umeclidinium (INCRUSE ELLIPTA) 62.5 mcg/actuation inhalation capsule Inhale 62.5 mcg into the lungs once daily. Controller 90 capsule 0    zinc gluconate 50 mg tablet Take 50 mg by mouth. 2 tablets per day       No current facility-administered medications for this visit.       Review of Systems   Constitutional:  Positive for appetite change and fatigue.   HENT:  Negative for sore throat.    Eyes:  Negative for visual disturbance.   Respiratory:  Positive for shortness of breath.    Cardiovascular:  Negative for chest pain.   Gastrointestinal:  Negative for abdominal pain.   Genitourinary:  Negative for dysuria.   Musculoskeletal:  Negative for back pain.        Leg pain   Skin:  Negative for rash.   Neurological:  Positive for weakness. Negative for headaches.   Hematological:  Negative for adenopathy.   Psychiatric/Behavioral:  The patient is not nervous/anxious.        ECOG Performance Status:   ECOG SCORE             Objective:      Vitals:   There were no vitals filed for this visit.      BMI: There is no height or weight on file to calculate  BMI.    Physical Exam  Vitals and nursing note reviewed.   Constitutional:       Appearance: He is well-developed.      Comments: Fatigued, in wheelchair   HENT:      Head: Normocephalic and atraumatic.   Eyes:      Pupils: Pupils are equal, round, and reactive to light.   Cardiovascular:      Rate and Rhythm: Normal rate and regular rhythm.   Pulmonary:      Effort: Pulmonary effort is normal.      Breath sounds: Normal breath sounds.   Abdominal:      General: Bowel sounds are normal.      Palpations: Abdomen is soft.   Musculoskeletal:         General: Normal range of motion.      Cervical back: Normal range of motion and neck supple.   Skin:     General: Skin is warm and dry.   Neurological:      Mental Status: He is alert and oriented to person, place, and time.   Psychiatric:         Behavior: Behavior normal.         Thought Content: Thought content normal.         Judgment: Judgment normal.         Laboratory Data:  Labs have been reviewed.    Lab Results   Component Value Date    WBC 10.96 05/16/2025    HGB 9.2 (L) 05/16/2025    HCT 29.4 (L) 05/16/2025    MCV 92 05/16/2025     05/16/2025           Imaging:    PET scan (7/8/25): I have personally reviewed the images  In this patient with history of bladder cancer status post radical open cystectomy, there is hypermetabolic nodular soft tissue density in the postoperative bed along the left pelvic sidewall concerning for malignant process.     Diffuse hypermetabolic lytic changes involving the right superior and inferior pubis, as well as the anterior portion of the left superior and inferior pubis, likely correlating with biopsy-proven metastatic disease.      NM bone scan (5/16/25):  Abnormal examination with development of prominent uptake within the pubic bones bilaterally greater on the right than on the left when compared to prior study dated 08/14/2014. The increased tracer uptake correlates with bony changes within the pubic bones bilaterally.  Findings may be related to lytic metastatic disease, however the findings are nonspecific and osteomyelitis would also be a consideration.     CT abdomen/pelvis (5/15/25): I have personally reviewed the images  Interval development of bone destruction involving the superior and inferior pubic rami on the right when compared to the prior examination.  Finding may represent osseous metastatic disease and less likely osteolysis due to osteomyelitis.     Status post cystectomy with ileal conduit.  Bilateral hydronephrosis is present with stranding of the perinephric fat particularly on the left.  The perinephric fat stranding is nonspecific and may be related to the hydronephrosis, however pyelonephritis cannot be excluded.     Surgical changes of spinal fusion extending from L2-S1.  No evidence for hardware failure.     Prominent atherosclerosis.  Prominent atherosclerotic calcification at the origin of the superior mesenteric artery.  Right common iliac and common femoral arterial stents are present as well as bilateral superficial femoral arterial stents.     Fat containing umbilical hernia.        CT chest/abdomen/pelvis (8/6/24): I have personally reviewed the images  1. Persistent posterior left lateral bladder wall thickening possibly slightly more prominent but likely related to bladder nondistention.  Left ureteral stent now present.  There is a small amount of air within the anterior bladder with tear felt to extend outside the bladder within the previously noted urachal remanent.  Air felt to be related to recent instrumentation.  Correlate clinically.  2. Extensive emphysematous findings with bilateral scarring.  Multiple small lingular pulmonary nodules present which may be stable and comparison with outside old chest CTs recommended.  Small irregular nodules within the inferior right upper lobe may represent nodular scarring.  Again comparison with older studies likely beneficial.  Attention on follow-up  "exams recommended to exclude malignancy/metastatic disease.  Partially calcified irregular opacity within the medial left upper lobe may be sequela of prior infectious or inflammatory etiology.  3. Small layering right pleural effusion.  4. Circumferential thickening of distal esophagus which may relate to esophagitis.  Correlate with EGD.  5. Diffuse atherosclerotic plaquing including prominent LAD coronary calcification.      Assessment:       1. Malignant neoplasm of overlapping sites of bladder    2. Secondary malignant neoplasm of bone    3. H/O total cystectomy    4. Chronic kidney disease, stage 3a    5. Chronic neoplasm-related pain    6. Nicotine dependence, cigarettes, w unsp disorders    7. History of prostate cancer             Plan:     Bladder cancer - stage 4 / history of cystectomy / secondary malignant neoplasm of bone  - I have reviewed his chart  - he underwent transurethral resection of bladder tumor on 7/30/24. Pathology revealed "high-grade papillary urothelial carcinoma." Muscularis propria is present and involved. Lymphovascular and perineural invasion were identified.  - CT scan (8/6/24) revealed "persistent posterior left lateral bladder wall thickening possibly slightly more prominent but likely related to bladder nondistention.  Left ureteral stent now present."  - due to his chronic kidney disease, I do not feel he is a candidate for neoadjuvant cisplatin-based chemotherapy.  - he underwent cystectomy on 8/26/24. Pathology revealed a pT3,pN0 urothelial cancer.  - CT abdomen/pelvis (5/15/25) and bone scan (5/16/25) are concerning for metastatic disease  - wife states he had a back surgery in April 2025. Wife reports that he had an opening near end of his incision. She feels the things seen on the CT scan/bone scan are related to that, not metastatic cancer.  - he underwent biopsy of pubis lesion on 6/19/25. Pathology revealed metastatic urothelial carcinoma.   - PET scan (7/8/25): "In " "this patient with history of bladder cancer status post radical open cystectomy, there is hypermetabolic nodular soft tissue density in the postoperative bed along the left pelvic sidewall concerning for malignant process. Diffuse hypermetabolic lytic changes involving the right superior and inferior pubis, as well as the anterior portion of the left superior and inferior pubis, likely correlating with biopsy-proven metastatic disease."  - he met with Dr. Cook on 7/10/25. He feels that there is too much disease to treat with radiation. Out of concern for quick progression, he recommended systemic therapy  - I recommend pembrolizumab +/- enfortumab vedotin-efjv   - The risks and benefits of chemotherapy were discussed, written information was given, and informed consent was obtained.  - will attempt to start therapy as soon as possible.  - return to clinic in one week.    2. Chronic kidney disease stage 3a  - eGFR ranges between 37 - 53 ml/min/m2  - continue to monitor    3. History of smoking  - he quit 4 years ago  - continue to monitor    4. History of prostate cancer  - he underwent prostatectomy 20 years ago.  - last PSA was undetectable    5. Chronic neoplasm-related pain  - related to pubic metastasis  - continue pain medicine as needed.  - he has seen radiation oncology. Will hold off palliative radiation at this time.     - return to clinic in one week.    Visit today included increased complexity associated with the care of the episodic problem neoplasm-related pain addressed and managing the longitudinal care of the patient due to the serious and/or complex managed problem(s) bladder cancer.      Marcin Melchor M.D.  Hematology/Oncology  [facility] - [address], [address]  [place] [address]  Phone: [phone]  Fax: [phone]  "

## 2025-07-15 NOTE — PLAN OF CARE
DISCONTINUE ON PATHWAY REGIMEN - Bladder    [id number]        Gemcitabine           Additional Orders: In the Juan José JJ et al study, patients achieving a CR   after 4 weeks of induction received consolidation with additional 2.5 weeks of   gemcitabine + RT.          Gemcitabine           Additional Orders: In the Juan José JJ et al study, patients achieving a CR   after 4 weeks of induction received consolidation with additional 2.5 weeks of   gemcitabine + RT.    **Always confirm dose/schedule in your pharmacy ordering system**    REASON: Other Reason  PRIOR TREATMENT: [id number]  TREATMENT RESPONSE: Unable to Evaluate    START ON PATHWAY REGIMEN - Bladder    [id number]        Enfortumab vedotin-ejfv (Padcev)       Pembrolizumab (Keytruda)           Additional Orders: In the EV-103 trial (Jason et al), if enfortumab   vedotin was discontinued due to unacceptable toxicity, pembrolizumab could   continue for up to a maximum of 35 cycles, or until radiographically confirmed   disease progression, unacceptable toxicity, investigator decision, consent   withdrawal, start of a subsequent anticancer therapy, pregnancy, or study   termination by the sponsor. If pembrolizumab was discontinued due to   unacceptable toxicity, enfortumab vedotin could continue until radiographically   confirmed disease progression, unacceptable toxicity, investigator decision,   consent withdrawal, start of a subsequent anticancer therapy, pregnancy, or   study termination by the sponsor. Serious immune-mediated adverse events can   occur with pembrolizumab. Please monitor your patient and refer to the linked   immune-mediated adverse reaction management materials for more information.    **Always confirm dose/schedule in your pharmacy ordering system**    Patient Characteristics:  Advanced/Metastatic Disease, First Line  Therapeutic Status: Advanced/Metastatic Disease  Line of Therapy: First Line    Intent of Therapy:  Non-Curative / Palliative Intent,  Discussed with Patient

## 2025-07-15 NOTE — Clinical Note
Good morning,  Trying to get him started on pembrolizumab/padcev as soon as possible. I'm seeing him today, and he has an appt on 7/21 at [facility]. I doubt it will be approved by then, but do you think we could have him scheduled as soon as possible (maybe later in the week).  Thanks so much!! joyce

## 2025-07-15 NOTE — PROGRESS NOTES
PATIENT: Rhett Johnson  MRN: [id number]  DATE: 7/21/2025    Diagnosis:   1. Malignant neoplasm of overlapping sites of bladder    2. Secondary malignant neoplasm of bone    3. Immunodeficiency due to drug therapy    4. H/O total cystectomy    5. Chronic kidney disease, stage 3a    6. Chronic neoplasm-related pain    7. History of prostate cancer    8. Nicotine dependence, cigarettes, w unsp disorders    9. Chemotherapy-induced nausea and vomiting    10. Drug-induced hypothyroidism      Chief Complaint: Bladder Cancer    Oncologic History:      Oncologic History Bladder cancer - stage IV      Oncologic Treatment TURBT (7/30/24)      Pathology 6/19/25:  BONE, PUBIS LESION, CT-GUIDED BIOPSY:   Metastatic urothelial carcinoma.     Comment:  The biopsy reveals metastatic poorly differentiated carcinoma involving bone.  Tumor cells are positive with HMWK, GATA3, CK7 and P63 (focal).  The cells are negative with PSA.  Controls are adequate.  Overall the findings are consistent with metastasis from this patient's known invasive urothelial carcinoma. MMR studies are performed on a previous biopsy sample ([facility]-).  This sample is insufficient for NGS studies given that decalcified solution was used to process tissue.  However, representative lesional tissue from the previous cystectomy specimen ([id number] ) will be used to attempt [facility] NGS and PD-L1 studies.     9/16/24:  1. RIGHT PELVIC LYMPH NODES, DISSECTION:  - Two lymph nodes, negative for carcinoma (0/2).  - The specimen is submitted entirely for histologic examination.  - See CAP synoptic report below.    2. LEFT PELVIC LYMPH NODES, DISSECTION:  - Benign fibrovascular tissue.  - No lymphoid tissue present.  - The specimen is submitted entirely for histologic examination.  - See CAP synoptic report below.    3. RIGHT DISTAL URETER, EXCISION:  - Negative for malignancy.  - Benign urothelial tissue.  - The specimen is submitted entirely for histologic  "examination.  - See CAP synoptic report below.    4. LEFT DISTAL URETER, EXCISION:  - Negative for malignancy.  - Benign urothelial tissue.  - The specimen is submitted entirely for histologic examination.  - See CAP synoptic report below.    5. BLADDER, RADICAL CYSTECTOMY:  - Invasive high grade urothelial carcinoma, 1.4 cm.  - The carcinoma invades perivesical soft tissue macroscopically.  - Prior procedure site changes.  - See CAP synoptic report below.    Case Summary (Bladder)    Specimen: Bladder.  Procedure: Radical cystectomy.  Tumor site: Right lateral/posterior.    Tumor size: 1.4 cm  Histologic type: Urothelial carcinoma, invasive.  Histologic grade: High grade.    Tumor extent: Invades perivesical soft tissue macroscopically.    Margins: Uninvolved by invasive carcinoma, carcinoma in situ and noninvasive papillary urothelial carcinoma.    Lymphovascular invasion: Not identified.    Treatment effect: No known presurgical neoadjuvant therapy.    Regional lymph nodes:     Number of lymph nodes involved: 0.     Number of lymph nodes examined: 2.    Distant sites: Not applicable.    pTNM classification (AJCC 8th Edition): pT3b pN0.     7/30/24:  Urinary bladder, transurethral resection of bladder tumor (TURBT):   - High-grade papillary urothelial carcinoma   - Muscularis propria is present and is involved by carcinoma   - Lymphovascular and perineural invasion are identified   - See synoptic report below   - Mismatch repair protein testing is pending and results will be reported in a supplemental report         Subjective:    History of Present Illness: Mr. Johnson is a 74 y.o. male who presented in August 2024 for evaluation and management of bladder cancer. He was referred by Dr. Griffin.    - he underwent transurethral resection of bladder tumor on 7/30/24. Pathology revealed "high-grade papillary urothelial carcinoma." Muscularis propria is present and involved. Lymphovascular and perineural invasion were " identified.  - he underwent CT scan on 8/6/24.    - he underwent cystectomy on 8/26/24. Pathology revealed a pT3,pN0 urothelial cancer.  - he underwent CT abdomen/pelvis and bone scan on 5/15/25 and 5/16/25, respectively.  - wife states he had a back surgery in April 2025. Wife reports that he had an opening near end of his incision. She feels the things seen on the CT scan/bone scan are related to that, not metastatic cancer.  - he underwent biopsy of pubis lesion on 6/19/25. Pathology revealed metastatic urothelial carcinoma.  - he underwent pet scan on 7/8/25.        Interval history:  - he presents for a follow-up appointment for his bladder cancer.  - he is scheduled for cycle #1, day #1 of pembrolizumab/enfortumab today, 7/21/25.  - he endorses severe right leg pain, dyspnea upon exertion. Wife notes confusion. He endorses fatigue, weakness, right leg pain, dyspnea upon exertion.        Past medical, surgical, family, and social histories have been reviewed and updated below.    Past Medical History:   Past Medical History:   Diagnosis Date    Anemia 09/09/2024    Asthma     Cancer     Carotid occlusion, left     100% blockage    COPD (chronic obstructive pulmonary disease)     Current tobacco use 08/20/2018    Deep vein thrombosis     Disorder of kidney and ureter     DVT (deep venous thrombosis)     Elevated WBC count 09/10/2024    Gross hematuria 07/30/2024    Hx of hepatitis     Hyperlipidemia     Hypertension     on medication monitoring    Hypertensive heart and chronic kidney disease with heart failure and stage 1 through stage 4 chronic kidney disease, or unspecified chronic kidney disease 08/29/2024    Documented on 01/03/2023      Peripheral artery disease     Prostate cancer 2001    Dr. Bai     PVCs (premature ventricular contractions) 09/19/2024    Previously symptomatic, now resolved on coreg      Urinary tract infection, site not specified        Past Surgical History:   Past Surgical History:    Procedure Laterality Date    ADENOIDECTOMY      APPENDECTOMY      Arthroscopic left knee Left     BARBOTAGE OF BLADDER N/A 7/30/2024    Procedure: BARBOTAGE, BLADDER;  Surgeon: Fabian Bai MD;  Location: [facility] OR;  Service: Urology;  Laterality: N/A;    BARBOTAGE OF URETER Bilateral 7/30/2024    Procedure: BARBOTAGE, URETER;  Surgeon: Fabian Bai MD;  Location: [facility] OR;  Service: Urology;  Laterality: Bilateral;    CARDIAC CATHETERIZATION      no stents    COLONOSCOPY      CREATION, ILEAL CONDUIT N/A 9/16/2024    Procedure: CREATION, ILEAL CONDUIT;  Surgeon: Hari Salguero MD;  [facility];  Service: Urology;  Laterality: N/A;    CYSTOSCOPY W/ RETROGRADES Bilateral 7/30/2024    Procedure: CYSTOSCOPY, WITH RETROGRADE PYELOGRAM;  Surgeon: Fabian Bai MD;  Location: [facility] OR;  Service: Urology;  Laterality: Bilateral;    CYSTOSCOPY W/ URETERAL STENT PLACEMENT Left 7/30/2024    Procedure: CYSTOSCOPY, WITH URETERAL STENT INSERTION;  Surgeon: Fabian Bai MD;  Location: [facility] OR;  Service: Urology;  Laterality: Left;    DILATION OF URETER Left 7/30/2024    Procedure: DILATION, URETER;  Surgeon: Fabian Bai MD;  Location: [facility] OR;  Service: Urology;  Laterality: Left;    DILATION OF URETHRA N/A 7/30/2024    Procedure: DILATION, URETHRA;  Surgeon: Fabian Bai MD;  Location: [facility] OR;  Service: Urology;  Laterality: N/A;    INSERTION OF MULTIFOCAL INTRAOCULAR LENS Left 12/12/2018    Procedure: INSERTION, IOL, MULTIFOCAL;  Surgeon: Eleanor Seaman MD;  Location: [facility] OR;  Service: Ophthalmology;  Laterality: Left;    INSERTION OF MULTIFOCAL INTRAOCULAR LENS Right 4/3/2019    Procedure: INSERTION, IOL, MULTIFOCAL;  Surgeon: Eleanor Seaman MD;  Location: [facility] OR;  Service: Ophthalmology;  Laterality: Right;    NECK SURGERY  2002    PHACOEMULSIFICATION OF CATARACT Left 12/12/2018    Procedure: PHACOEMULSIFICATION, CATARACT;  Surgeon: Eleanor Seaman MD;  Location: [facility] OR;  Service:  Ophthalmology;  Laterality: Left;    PHACOEMULSIFICATION OF CATARACT Right 4/3/2019    Procedure: PHACOEMULSIFICATION, CATARACT;  Surgeon: Eleanor Seaman MD;  Location: [facility];  Service: Ophthalmology;  Laterality: Right;    PROSTATE SURGERY  2001    Radical prostectemy     RADICAL CYSTECTOMY N/A 9/16/2024    Procedure: CYSTECTOMY, RADICAL;  Surgeon: Hari Salguero MD;  Location: [address];  Service: Urology;  Laterality: N/A;    SPINE SURGERY  2002    Fusion of c4,c5,c6    TONSILLECTOMY      TURBT (TRANSURETHRAL RESECTION OF BLADDER TUMOR) N/A 7/30/2024    Procedure: TURBT (TRANSURETHRAL RESECTION OF BLADDER TUMOR);  Surgeon: Fabian Bai MD;  Location: [facility] OR;  Service: Urology;  Laterality: N/A;    URETEROSCOPY Bilateral 7/30/2024    Procedure: URETEROSCOPY;  Surgeon: Fabian Bai MD;  Location: [facility];  Service: Urology;  Laterality: Bilateral;       Family History:   Family History   Problem Relation Name Age of Onset    Alzheimer's disease Mother      Diabetes Father      Diabetes Sister      Cervical cancer Sister      Cancer Sister      Diabetes Brother      Hypertension Brother      Heart disease Brother          CABG    Obesity Brother      COPD Brother      Scoliosis Daughter Sue     No Known Problems Son Fish     No Known Problems Daughter Stephanie        Social History:  reports that he has quit smoking. His smoking use included cigarettes. He has a 51 pack-year smoking history. He has never used smokeless tobacco. He reports that he does not currently use alcohol. He reports that he does not use drugs.    Allergies:  Review of patient's allergies indicates:   Allergen Reactions    Pcn [penicillins] Anaphylaxis       Medications:  Current Outpatient Medications   Medication Sig Dispense Refill    acetaminophen (TYLENOL) 80 MG Chew Take by mouth.      albuterol (PROVENTIL/VENTOLIN HFA) 90 mcg/actuation inhaler Inhale 2 puffs into the lungs every 6 (six) hours as needed for  Wheezing. Rescue 18 g 11    ascorbic acid, vitamin C, (VITAMIN C) 100 MG tablet Take 100 mg by mouth.      aspirin (ECOTRIN) 81 MG EC tablet Take 81 mg by mouth once daily.      carvediloL (COREG) 12.5 MG tablet       cholecalciferol, vitamin D3, 10 mcg (400 unit) Cap capsule Take 1 tablet by mouth.      cyclobenzaprine (FLEXERIL) 10 MG tablet Take 1 tablet (10 mg total) by mouth 3 (three) times daily as needed for Muscle spasms. 180 tablet 0    furosemide (LASIX) 40 MG tablet daily as needed.      gabapentin (NEURONTIN) 600 MG tablet Take 1 tablet (600 mg total) by mouth 3 (three) times daily. 270 tablet 0    magnesium 250 mg Tab Take 1 tablet by mouth 2 (two) times daily.      multivitamin (THERAGRAN) per tablet Take 1 tablet by mouth once daily.      ondansetron (ZOFRAN-ODT) 8 MG TbDL Take 1 tablet (8 mg total) by mouth every 8 (eight) hours as needed (chemotherapy-induced nausea and vomiting). 40 tablet 5    oxyCODONE-acetaminophen (PERCOCET)  mg per tablet Take 1 tablet by mouth every 6 (six) hours as needed for Pain (chronic neoplasm-related pain). 120 tablet 0    prochlorperazine (COMPAZINE) 5 MG tablet Take 1 tablet (5 mg total) by mouth every 6 (six) hours as needed for Nausea. 30 tablet 1    rosuvastatin (CRESTOR) 40 MG Tab Take 40 mg by mouth.      tiotropium (SPIRIVA) 18 mcg inhalation capsule Inhale 18 mcg into the lungs every 6 (six) hours as needed.      umeclidinium (INCRUSE ELLIPTA) 62.5 mcg/actuation inhalation capsule Inhale 62.5 mcg into the lungs once daily. Controller 90 capsule 0    zinc gluconate 50 mg tablet Take 50 mg by mouth. 2 tablets per day       No current facility-administered medications for this visit.       Review of Systems   Constitutional:  Positive for appetite change and fatigue.   HENT:  Negative for sore throat.    Eyes:  Negative for visual disturbance.   Respiratory:  Positive for shortness of breath.    Cardiovascular:  Negative for chest pain.   Gastrointestinal:   Negative for abdominal pain.   Genitourinary:  Negative for dysuria.   Musculoskeletal:  Negative for back pain.        Leg pain   Skin:  Negative for rash.   Neurological:  Positive for weakness. Negative for headaches.   Hematological:  Negative for adenopathy.   Psychiatric/Behavioral:  The patient is not nervous/anxious.        ECOG Performance Status:   ECOG SCORE    2 - Capable of all selfcare but unable to carry out any work activities, active > 50% of hours         Objective:      Vitals:   Vitals:    07/21/25 1105   BP: 107/62   BP Location: Right arm   Patient Position: Sitting   Pulse: 78   Resp: 18   Temp: 97.9 °F (36.6 °C)   TempSrc: Oral   SpO2: 95%   Weight: 70.1 kg (154 lb 8.7 oz)         BMI: Body mass index is 22.17 kg/m².    Physical Exam  Vitals and nursing note reviewed.   Constitutional:       Appearance: He is well-developed.      Comments: Fatigued, in wheelchair   HENT:      Head: Normocephalic and atraumatic.   Eyes:      Pupils: Pupils are equal, round, and reactive to light.   Cardiovascular:      Rate and Rhythm: Normal rate and regular rhythm.   Pulmonary:      Effort: Pulmonary effort is normal.      Breath sounds: Normal breath sounds.   Abdominal:      General: Bowel sounds are normal.      Palpations: Abdomen is soft.   Musculoskeletal:         General: Normal range of motion.      Cervical back: Normal range of motion and neck supple.   Skin:     General: Skin is warm and dry.   Neurological:      Mental Status: He is alert and oriented to person, place, and time.   Psychiatric:         Behavior: Behavior normal.         Thought Content: Thought content normal.         Judgment: Judgment normal.         Laboratory Data:  Labs have been reviewed.    Lab Results   Component Value Date    WBC 12.45 07/21/2025    HGB 10.2 (L) 07/21/2025    HCT 32.7 (L) 07/21/2025    MCV 89 07/21/2025     07/21/2025           Imaging:    PET scan (7/8/25): I have personally reviewed the  images  In this patient with history of bladder cancer status post radical open cystectomy, there is hypermetabolic nodular soft tissue density in the postoperative bed along the left pelvic sidewall concerning for malignant process.     Diffuse hypermetabolic lytic changes involving the right superior and inferior pubis, as well as the anterior portion of the left superior and inferior pubis, likely correlating with biopsy-proven metastatic disease.      NM bone scan (5/16/25):  Abnormal examination with development of prominent uptake within the pubic bones bilaterally greater on the right than on the left when compared to prior study dated 08/14/2014. The increased tracer uptake correlates with bony changes within the pubic bones bilaterally. Findings may be related to lytic metastatic disease, however the findings are nonspecific and osteomyelitis would also be a consideration.     CT abdomen/pelvis (5/15/25): I have personally reviewed the images  Interval development of bone destruction involving the superior and inferior pubic rami on the right when compared to the prior examination.  Finding may represent osseous metastatic disease and less likely osteolysis due to osteomyelitis.     Status post cystectomy with ileal conduit.  Bilateral hydronephrosis is present with stranding of the perinephric fat particularly on the left.  The perinephric fat stranding is nonspecific and may be related to the hydronephrosis, however pyelonephritis cannot be excluded.     Surgical changes of spinal fusion extending from L2-S1.  No evidence for hardware failure.     Prominent atherosclerosis.  Prominent atherosclerotic calcification at the origin of the superior mesenteric artery.  Right common iliac and common femoral arterial stents are present as well as bilateral superficial femoral arterial stents.     Fat containing umbilical hernia.        CT chest/abdomen/pelvis (8/6/24): I have personally reviewed the images  1.  "Persistent posterior left lateral bladder wall thickening possibly slightly more prominent but likely related to bladder nondistention.  Left ureteral stent now present.  There is a small amount of air within the anterior bladder with tear felt to extend outside the bladder within the previously noted urachal remanent.  Air felt to be related to recent instrumentation.  Correlate clinically.  2. Extensive emphysematous findings with bilateral scarring.  Multiple small lingular pulmonary nodules present which may be stable and comparison with outside old chest CTs recommended.  Small irregular nodules within the inferior right upper lobe may represent nodular scarring.  Again comparison with older studies likely beneficial.  Attention on follow-up exams recommended to exclude malignancy/metastatic disease.  Partially calcified irregular opacity within the medial left upper lobe may be sequela of prior infectious or inflammatory etiology.  3. Small layering right pleural effusion.  4. Circumferential thickening of distal esophagus which may relate to esophagitis.  Correlate with EGD.  5. Diffuse atherosclerotic plaquing including prominent LAD coronary calcification.      Assessment:       1. Malignant neoplasm of overlapping sites of bladder    2. Secondary malignant neoplasm of bone    3. Immunodeficiency due to drug therapy    4. H/O total cystectomy    5. Chronic kidney disease, stage 3a    6. Chronic neoplasm-related pain    7. History of prostate cancer    8. Nicotine dependence, cigarettes, w unsp disorders    9. Chemotherapy-induced nausea and vomiting    10. Drug-induced hypothyroidism             Plan:     Bladder cancer - stage 4 / history of cystectomy / secondary malignant neoplasm of bone  - I have reviewed his chart  - he underwent transurethral resection of bladder tumor on 7/30/24. Pathology revealed "high-grade papillary urothelial carcinoma." Muscularis propria is present and involved. " "Lymphovascular and perineural invasion were identified.  - CT scan (8/6/24) revealed "persistent posterior left lateral bladder wall thickening possibly slightly more prominent but likely related to bladder nondistention.  Left ureteral stent now present."  - due to his chronic kidney disease, I do not feel he is a candidate for neoadjuvant cisplatin-based chemotherapy.  - he underwent cystectomy on 8/26/24. Pathology revealed a pT3,pN0 urothelial cancer.  - CT abdomen/pelvis (5/15/25) and bone scan (5/16/25) are concerning for metastatic disease  - wife states he had a back surgery in April 2025. Wife reports that he had an opening near end of his incision. She feels the things seen on the CT scan/bone scan are related to that, not metastatic cancer.  - he underwent biopsy of pubis lesion on 6/19/25. Pathology revealed metastatic urothelial carcinoma.   - PET scan (7/8/25): "In this patient with history of bladder cancer status post radical open cystectomy, there is hypermetabolic nodular soft tissue density in the postoperative bed along the left pelvic sidewall concerning for malignant process. Diffuse hypermetabolic lytic changes involving the right superior and inferior pubis, as well as the anterior portion of the left superior and inferior pubis, likely correlating with biopsy-proven metastatic disease."  - he met with Dr. Cook on 7/10/25. He feels that there is too much disease to treat with radiation. Out of concern for quick progression, he recommended systemic therapy  - I recommend pembrolizumab +/- enfortumab [id number]   - The risks and benefits of chemotherapy were discussed, written information was given, and informed consent was obtained.  - Labs have been reviewed. Okay to proceed with cycle #1 of pembrolizumab/enfortumab today, 7/21/25.  - plan for port placement  - I sent in emla cream   - return to clinic in one week in preparation for cycle #1, day #8 of pembrolizumab/enfortumab today, " 7/21/25.    2. Chronic kidney disease stage 3a  - eGFR ranges between 37 - 53 ml/min/m2  - continue to monitor    3. History of smoking  - he quit 4 years ago  - continue to monitor    4. History of prostate cancer  - he underwent prostatectomy 20 years ago.  - last PSA was undetectable    5. Chronic neoplasm-related pain  - related to pubic metastasis  - continue pain medicine as needed.  - he has seen radiation oncology. Will hold off palliative radiation at this time.     6. Cystitis  - urinalysis shows nitrite-positive urine  - will send in ciprofloxacin. Follow up urine culture    - return to clinic in one week in preparation for cycle #1, day #8 of pembrolizumab/enfortumab today, 7/21/25.    Visit today included increased complexity associated with the care of the episodic problem neoplasm-related pain addressed and managing the longitudinal care of the patient due to the serious and/or complex managed problem(s) bladder cancer.      Marcin Melchor M.D.  Hematology/Oncology  [facility] - [address], [address]  [place] [address]  Phone: [phone]  Fax: [phone]

## 2025-07-16 ENCOUNTER — PATIENT MESSAGE (OUTPATIENT)
Dept: HEMATOLOGY/ONCOLOGY | Facility: CLINIC | Age: 75
End: 2025-07-16
Payer: MEDICARE

## 2025-07-17 ENCOUNTER — TELEPHONE (OUTPATIENT)
Dept: INTERVENTIONAL RADIOLOGY/VASCULAR | Facility: CLINIC | Age: 75
End: 2025-07-17
Payer: MEDICARE

## 2025-07-17 DIAGNOSIS — C67.8 MALIGNANT NEOPLASM OF OVERLAPPING SITES OF BLADDER: Primary | ICD-10-CM

## 2025-07-18 ENCOUNTER — PATIENT MESSAGE (OUTPATIENT)
Dept: INTERVENTIONAL RADIOLOGY/VASCULAR | Facility: HOSPITAL | Age: 75
End: 2025-07-18
Payer: MEDICARE

## 2025-07-21 ENCOUNTER — OFFICE VISIT (OUTPATIENT)
Dept: HEMATOLOGY/ONCOLOGY | Facility: CLINIC | Age: 75
End: 2025-07-21
Payer: MEDICARE

## 2025-07-21 ENCOUNTER — TELEPHONE (OUTPATIENT)
Dept: INTERVENTIONAL RADIOLOGY/VASCULAR | Facility: HOSPITAL | Age: 75
End: 2025-07-21
Payer: MEDICARE

## 2025-07-21 VITALS
BODY MASS INDEX: 22.17 KG/M2 | WEIGHT: 154.56 LBS | HEART RATE: 78 BPM | RESPIRATION RATE: 18 BRPM | OXYGEN SATURATION: 95 % | DIASTOLIC BLOOD PRESSURE: 62 MMHG | SYSTOLIC BLOOD PRESSURE: 107 MMHG | TEMPERATURE: 98 F

## 2025-07-21 DIAGNOSIS — R30.0 DYSURIA: Primary | ICD-10-CM

## 2025-07-21 DIAGNOSIS — G89.3 CHRONIC NEOPLASM-RELATED PAIN: ICD-10-CM

## 2025-07-21 DIAGNOSIS — Z90.6 H/O TOTAL CYSTECTOMY: ICD-10-CM

## 2025-07-21 DIAGNOSIS — N18.31 CHRONIC KIDNEY DISEASE, STAGE 3A: ICD-10-CM

## 2025-07-21 DIAGNOSIS — Z85.46 HISTORY OF PROSTATE CANCER: ICD-10-CM

## 2025-07-21 DIAGNOSIS — N30.00 ACUTE CYSTITIS WITHOUT HEMATURIA: ICD-10-CM

## 2025-07-21 DIAGNOSIS — T45.1X5A CHEMOTHERAPY-INDUCED NAUSEA AND VOMITING: ICD-10-CM

## 2025-07-21 DIAGNOSIS — Z79.899 IMMUNODEFICIENCY DUE TO DRUG THERAPY: ICD-10-CM

## 2025-07-21 DIAGNOSIS — C79.51 SECONDARY MALIGNANT NEOPLASM OF BONE: ICD-10-CM

## 2025-07-21 DIAGNOSIS — D84.821 IMMUNODEFICIENCY DUE TO DRUG THERAPY: ICD-10-CM

## 2025-07-21 DIAGNOSIS — C67.8 MALIGNANT NEOPLASM OF OVERLAPPING SITES OF BLADDER: Primary | ICD-10-CM

## 2025-07-21 DIAGNOSIS — F17.219 NICOTINE DEPENDENCE, CIGARETTES, W UNSP DISORDERS: ICD-10-CM

## 2025-07-21 DIAGNOSIS — R11.2 CHEMOTHERAPY-INDUCED NAUSEA AND VOMITING: ICD-10-CM

## 2025-07-21 DIAGNOSIS — E03.2 DRUG-INDUCED HYPOTHYROIDISM: ICD-10-CM

## 2025-07-21 PROCEDURE — 4010F ACE/ARB THERAPY RXD/TAKEN: CPT | Mod: CPTII,S$GLB,, | Performed by: INTERNAL MEDICINE

## 2025-07-21 PROCEDURE — 1159F MED LIST DOCD IN RCRD: CPT | Mod: CPTII,S$GLB,, | Performed by: INTERNAL MEDICINE

## 2025-07-21 PROCEDURE — 3008F BODY MASS INDEX DOCD: CPT | Mod: CPTII,S$GLB,, | Performed by: INTERNAL MEDICINE

## 2025-07-21 PROCEDURE — 3066F NEPHROPATHY DOC TX: CPT | Mod: CPTII,S$GLB,, | Performed by: INTERNAL MEDICINE

## 2025-07-21 PROCEDURE — 1160F RVW MEDS BY RX/DR IN RCRD: CPT | Mod: CPTII,S$GLB,, | Performed by: INTERNAL MEDICINE

## 2025-07-21 PROCEDURE — 3060F POS MICROALBUMINURIA REV: CPT | Mod: CPTII,S$GLB,, | Performed by: INTERNAL MEDICINE

## 2025-07-21 PROCEDURE — 99215 OFFICE O/P EST HI 40 MIN: CPT | Mod: S$GLB,,, | Performed by: INTERNAL MEDICINE

## 2025-07-21 PROCEDURE — 1125F AMNT PAIN NOTED PAIN PRSNT: CPT | Mod: CPTII,S$GLB,, | Performed by: INTERNAL MEDICINE

## 2025-07-21 PROCEDURE — 99999 PR PBB SHADOW E&M-EST. PATIENT-LVL IV: CPT | Mod: PBBFAC,,, | Performed by: INTERNAL MEDICINE

## 2025-07-21 PROCEDURE — 3078F DIAST BP <80 MM HG: CPT | Mod: CPTII,S$GLB,, | Performed by: INTERNAL MEDICINE

## 2025-07-21 PROCEDURE — 3074F SYST BP LT 130 MM HG: CPT | Mod: CPTII,S$GLB,, | Performed by: INTERNAL MEDICINE

## 2025-07-21 PROCEDURE — 1157F ADVNC CARE PLAN IN RCRD: CPT | Mod: CPTII,S$GLB,, | Performed by: INTERNAL MEDICINE

## 2025-07-21 PROCEDURE — G2211 COMPLEX E/M VISIT ADD ON: HCPCS | Mod: S$GLB,,, | Performed by: INTERNAL MEDICINE

## 2025-07-21 RX ORDER — ONDANSETRON HYDROCHLORIDE 2 MG/ML
8 INJECTION, SOLUTION INTRAVENOUS
Status: CANCELLED | OUTPATIENT
Start: 2025-07-21

## 2025-07-21 RX ORDER — EPINEPHRINE 0.3 MG/.3ML
0.3 INJECTION SUBCUTANEOUS ONCE AS NEEDED
OUTPATIENT
Start: 2025-07-28

## 2025-07-21 RX ORDER — DIPHENHYDRAMINE HYDROCHLORIDE 50 MG/ML
50 INJECTION, SOLUTION INTRAMUSCULAR; INTRAVENOUS ONCE AS NEEDED
Status: CANCELLED | OUTPATIENT
Start: 2025-07-21

## 2025-07-21 RX ORDER — CIPROFLOXACIN 500 MG/1
500 TABLET, FILM COATED ORAL 2 TIMES DAILY
Qty: 14 TABLET | Refills: 0 | Status: SHIPPED | OUTPATIENT
Start: 2025-07-21 | End: 2025-07-28

## 2025-07-21 RX ORDER — ONDANSETRON HYDROCHLORIDE 2 MG/ML
8 INJECTION, SOLUTION INTRAVENOUS
OUTPATIENT
Start: 2025-07-28

## 2025-07-21 RX ORDER — HEPARIN 100 UNIT/ML
500 SYRINGE INTRAVENOUS
OUTPATIENT
Start: 2025-07-28

## 2025-07-21 RX ORDER — PROCHLORPERAZINE EDISYLATE 5 MG/ML
5 INJECTION, SOLUTION INTRAMUSCULAR; INTRAVENOUS ONCE AS NEEDED
Status: CANCELLED | OUTPATIENT
Start: 2025-07-21

## 2025-07-21 RX ORDER — PROCHLORPERAZINE EDISYLATE 5 MG/ML
5 INJECTION, SOLUTION INTRAMUSCULAR; INTRAVENOUS ONCE AS NEEDED
OUTPATIENT
Start: 2025-07-28

## 2025-07-21 RX ORDER — DIPHENHYDRAMINE HYDROCHLORIDE 50 MG/ML
50 INJECTION, SOLUTION INTRAMUSCULAR; INTRAVENOUS ONCE AS NEEDED
OUTPATIENT
Start: 2025-07-28

## 2025-07-21 RX ORDER — EPINEPHRINE 0.3 MG/.3ML
0.3 INJECTION SUBCUTANEOUS ONCE AS NEEDED
Status: CANCELLED | OUTPATIENT
Start: 2025-07-21

## 2025-07-21 RX ORDER — HEPARIN 100 UNIT/ML
500 SYRINGE INTRAVENOUS
Status: CANCELLED | OUTPATIENT
Start: 2025-07-21

## 2025-07-21 RX ORDER — LIDOCAINE AND PRILOCAINE 25; 25 MG/G; MG/G
CREAM TOPICAL
Qty: 30 G | Refills: 1 | Status: SHIPPED | OUTPATIENT
Start: 2025-07-21

## 2025-07-21 RX ORDER — SODIUM CHLORIDE 0.9 % (FLUSH) 0.9 %
10 SYRINGE (ML) INJECTION
OUTPATIENT
Start: 2025-07-28

## 2025-07-21 RX ORDER — SODIUM CHLORIDE 0.9 % (FLUSH) 0.9 %
10 SYRINGE (ML) INJECTION
Status: CANCELLED | OUTPATIENT
Start: 2025-07-21

## 2025-07-21 NOTE — NURSING
Pre-procedure call complete.  2 patient identifier used (name and ).   Arrival time 1230.      No food after midnight.  You may drink clear liquids (sports drinks, clear juices) until 2 hours before arrival time.  Clear liquids include only water, black coffee (no creamer), clear oral rehydration drinks, clear sports drinks and clear fruit juices.  Clear liquids do NOT include anything with pulp or food particles (chicken broth, ice cream, yogurt, jello, etc).  NO orange juice, pulpy juices, or apple cider.  If you can read newsprint through the liquid, it qualifies as clear.  IF UNSURE, drink water instead.      Have NOTHING BY MOUTH 2 hours before arrival time.  Medication the morning of your procedure may be taken with a sip of water.    Patient aware will need someone to provide transport home and monitor pt 8 hours post procedure.  No driving for at least 24 hours after procedure.   Patient advised to take blood pressure, heart medications, with a sip of water morning of procedure.  Patient verbalized aware of which medications to take.  Do not take  sleep medication (including OTC) and anxiety medication the night before procedure.  Arrival time and location given.  Expected length of stay reviewed.  Covid screening completed.  Patient verbalized understanding of all pre-procedure instructions.  Written instructions and directions sent to patient in Mychart/portal.

## 2025-07-25 ENCOUNTER — PATIENT MESSAGE (OUTPATIENT)
Dept: HEMATOLOGY/ONCOLOGY | Facility: CLINIC | Age: 75
End: 2025-07-25
Payer: MEDICARE

## 2025-07-30 ENCOUNTER — TELEPHONE (OUTPATIENT)
Dept: HEMATOLOGY/ONCOLOGY | Facility: CLINIC | Age: 75
End: 2025-07-30
Payer: MEDICARE

## 2025-07-30 DIAGNOSIS — R30.0 DYSURIA: Primary | ICD-10-CM

## 2025-07-30 NOTE — TELEPHONE ENCOUNTER
Copied from CRM #[id number]. Topic: General Inquiry - Patient Advice  >> Jul 30, 2025  8:49 AM Mir wrote:  Type:  Needs Medical Advice    Who Called: Pt's wife Rhina  Symptoms (please be specific): na   How long has patient had these symptoms:  na  Pharmacy name and phone #:  na  Would the patient rather a call back or a response via [facility]? Call back  Best Call Back Number: [phone]  Additional Information: Pt's wife is requesting a call back asap regarding needing orders for a urine specimen  >> Jul 30, 2025 10:28 AM Violeta Esquivel PA-C wrote:  Done  ----- Message -----  From: Ruth Ibarra RN  Sent: 7/30/2025   9:26 AM CDT  To: KANU Azar can you up in for a UA for him  ----- Message -----  From: Mir Monae  Sent: 7/30/2025   8:53 AM CDT  To: [facility] Staff    >> Jul 30, 2025  9:26 AM CLINTON Miranda wrote:  Richi can you up in for a UA for him  ----- Message -----  From: Mir Monae  Sent: 7/30/2025   8:53 AM CDT  To: [facility] Staff

## 2025-08-01 ENCOUNTER — TELEPHONE (OUTPATIENT)
Dept: HEMATOLOGY/ONCOLOGY | Facility: CLINIC | Age: 75
End: 2025-08-01
Payer: MEDICARE

## 2025-08-01 DIAGNOSIS — N30.00 ACUTE CYSTITIS WITHOUT HEMATURIA: Primary | ICD-10-CM

## 2025-08-01 RX ORDER — DOXYCYCLINE HYCLATE 100 MG
100 TABLET ORAL 2 TIMES DAILY
Qty: 14 TABLET | Refills: 0 | Status: SHIPPED | OUTPATIENT
Start: 2025-08-01

## 2025-08-01 NOTE — TELEPHONE ENCOUNTER
----- Message from Marcin Melchor MD sent at 8/1/2025 12:54 AM CDT -----  I sent in antibiotic and put in urine culture order.    Thanks!

## 2025-08-05 ENCOUNTER — PATIENT MESSAGE (OUTPATIENT)
Dept: HEMATOLOGY/ONCOLOGY | Facility: CLINIC | Age: 75
End: 2025-08-05
Payer: MEDICARE

## 2025-08-06 ENCOUNTER — TELEPHONE (OUTPATIENT)
Dept: HEMATOLOGY/ONCOLOGY | Facility: CLINIC | Age: 75
End: 2025-08-06
Payer: MEDICARE

## 2025-08-06 NOTE — TELEPHONE ENCOUNTER
Documents have been faxed      Copied from CRM #[id number]. Topic: General Inquiry - Patient Advice  >> Aug 6, 2025  3:07 PM Carmen wrote:  Type:  Needs Medical Advice    Who Called: Lacey cat/[facility]  Would the patient rather a call back or a response via [facility]? Call  Best Call Back Number: [phone]  Additional Information: Calling to request patients last ov, pathology reports, recent labs or scans. F# [phone]

## (undated) DEVICE — GOWN NONREINF SET-IN SLV 2XL

## (undated) DEVICE — SYR IRRIGATION BULB STER 60ML

## (undated) DEVICE — TRAY CATH 1-LYR URIMTR 16FR

## (undated) DEVICE — SUT CTD VICRYL VIL BR UR-5

## (undated) DEVICE — CLIP LIGACLIP XTRA TITANIUM

## (undated) DEVICE — BAG DRAIN ANTI REFLUX 2000ML

## (undated) DEVICE — ADHESIVE DERMABOND ADVANCED

## (undated) DEVICE — CUTTER PROXIMATE BLUE 55MM

## (undated) DEVICE — DRESSING ADH ISLAND 3.6 X 14

## (undated) DEVICE — SUT MONOCRYL 3-0 SH U/D

## (undated) DEVICE — SPONGE COTTON TRAY 4X4IN

## (undated) DEVICE — APPLICATOR CHLORAPREP ORN 26ML

## (undated) DEVICE — SUT 0 54IN COATED VICRYL U

## (undated) DEVICE — NDL 22GA X1 1/2 REG BEVEL

## (undated) DEVICE — LOOP VESSEL YELLOW MAXI

## (undated) DEVICE — DRAPE ABDOMINAL TIBURON 14X11

## (undated) DEVICE — LUBRICANT SURGILUBE 2 OZ

## (undated) DEVICE — SUT CTD VICRYL VIL BR SH 27

## (undated) DEVICE — SYR 50ML CATH TIP

## (undated) DEVICE — COVER LIGHT HANDLE 80/CA

## (undated) DEVICE — SYR 30CC LUER LOCK

## (undated) DEVICE — DRAPE INCISE IOBAN 2 23X17IN

## (undated) DEVICE — TRAY GENERAL SURGERY OMC

## (undated) DEVICE — DRAPE STERI INSTRUMENT 1018

## (undated) DEVICE — SUT ETHILON 2-0 PSLX 30IN

## (undated) DEVICE — SUT CTD VICRYL BR UR-5

## (undated) DEVICE — GOWN SURGICAL X-LARGE

## (undated) DEVICE — LEGGING CLEAR POLY 2/PACK

## (undated) DEVICE — SUT VICRYL 2-0 27 UR-5

## (undated) DEVICE — ELECTRODE REM PLYHSV RETURN 9

## (undated) DEVICE — CLIPPER BLADE MOD 4406 (CAREF)

## (undated) DEVICE — RELOAD PROXIMATE CUT BLUE 55MM

## (undated) DEVICE — BLADE SURG #15 CARBON STEEL

## (undated) DEVICE — TIP YANKAUERS BULB NO VENT

## (undated) DEVICE — CATH RED RUBBER LATEX 14F 16IN

## (undated) DEVICE — SUT PDS II O CTX MONO 60

## (undated) DEVICE — NDL MAYO CAT 1/2 CIR #6

## (undated) DEVICE — ALCOHOL 70% ISOP W/GREEN 16OZ

## (undated) DEVICE — SUT CTD VICRYL UND BR CP-1

## (undated) DEVICE — EVACUATOR WOUND BULB 100CC

## (undated) DEVICE — Device

## (undated) DEVICE — SEALER LIGASURE MARYLAND 23CM

## (undated) DEVICE — SUT SILK 3-0 SH 18IN BLACK

## (undated) DEVICE — SUT 2-0 VICRYL / SH (J417)

## (undated) DEVICE — COVER MAYO STND XL 30X57IN

## (undated) DEVICE — DRAIN CHANNEL ROUND 19FR

## (undated) DEVICE — CUTTER PROXIMATE BLUE 75MM

## (undated) DEVICE — DRAPE UINDERBUT GRAD PCH

## (undated) DEVICE — TUBING SUC UNIV W/CONN 12FT

## (undated) DEVICE — CLIP MED TICALL

## (undated) DEVICE — GAUZE SPONGE PEANUT STRL

## (undated) DEVICE — BOWL STERILE LARGE 32OZ

## (undated) DEVICE — CUP MEDICINE STERILE 2OZ

## (undated) DEVICE — SUT MONOCRYL 3-0 RB1

## (undated) DEVICE — SUT MCRYL PLUS 4-0 PS2 27IN

## (undated) DEVICE — NDL BOX COUNTER

## (undated) DEVICE — KIT UROSTOMY

## (undated) DEVICE — SUT 2/0 54IN COATED VICRYL